# Patient Record
Sex: FEMALE | Race: WHITE | NOT HISPANIC OR LATINO | Employment: FULL TIME
[De-identification: names, ages, dates, MRNs, and addresses within clinical notes are randomized per-mention and may not be internally consistent; named-entity substitution may affect disease eponyms.]

---

## 2018-03-31 ENCOUNTER — HEALTH MAINTENANCE LETTER (OUTPATIENT)
Age: 58
End: 2018-03-31

## 2019-09-11 ENCOUNTER — OFFICE VISIT (OUTPATIENT)
Dept: FAMILY MEDICINE | Facility: CLINIC | Age: 59
End: 2019-09-11
Payer: COMMERCIAL

## 2019-09-11 VITALS
DIASTOLIC BLOOD PRESSURE: 81 MMHG | SYSTOLIC BLOOD PRESSURE: 141 MMHG | HEART RATE: 70 BPM | TEMPERATURE: 97.6 F | BODY MASS INDEX: 55.91 KG/M2 | RESPIRATION RATE: 16 BRPM | OXYGEN SATURATION: 98 % | WEIGHT: 293 LBS

## 2019-09-11 DIAGNOSIS — R03.0 ELEVATED BP WITHOUT DIAGNOSIS OF HYPERTENSION: ICD-10-CM

## 2019-09-11 DIAGNOSIS — M25.562 LEFT KNEE PAIN, UNSPECIFIED CHRONICITY: Primary | ICD-10-CM

## 2019-09-11 DIAGNOSIS — E66.01 MORBID OBESITY DUE TO EXCESS CALORIES (H): ICD-10-CM

## 2019-09-11 PROCEDURE — 99204 OFFICE O/P NEW MOD 45 MIN: CPT | Performed by: INTERNAL MEDICINE

## 2019-09-11 RX ORDER — LIRAGLUTIDE 6 MG/ML
INJECTION, SOLUTION SUBCUTANEOUS
Refills: 1 | COMMUNITY
Start: 2019-06-27 | End: 2020-01-07

## 2019-09-11 RX ORDER — ERGOCALCIFEROL 1.25 MG/1
CAPSULE, LIQUID FILLED ORAL
Refills: 1 | COMMUNITY
Start: 2019-07-02 | End: 2020-02-24

## 2019-09-11 NOTE — LETTER
September 11, 2019      Ludmila Sanchez  [address] [facility] [address]        To Whom It May Concern:    Ludmila Sanchez was seen in our clinic. She is not to stand or walk for more than one hour a day at work.       Sincerely,        Uyen Peñaloza MD

## 2019-09-11 NOTE — PROGRESS NOTES
SUBJECTIVE:  Ludmila Sanchez is an 59 year old female who presents for knee pain.  Moved from [place] to [place] recently and has a new job where she stands a lot. Is having pain in left knee where had knee replacement done in , and has done well until starting to stand kwame much at work.  Some swelling of the knee.  The right knee feels a little irritated as well.  Has taken some otc med for pain which helps a little.      PMH:   has a past medical history of Abnormal finding on MRI of brain, Cavernous angioma (2013), Bipolar disorder (H) (), History of colonoscopy (), Memory difficulties (2013), Sleep apnea, and Vitamin B12 deficiency (non anaemic) (2013).  Patient Active Problem List   Diagnosis     CARDIOVASCULAR SCREENING; LDL GOAL LESS THAN 160     Mild major depression (H)     Vitamin D deficiency     Bipolar affective disorder (H)     Memory difficulties     Vitamin B12 deficiency without anemia     Colonic polyp     Obesity     Cerebral cavernoma     Advanced directives, counseling/discussion     TEODORO (obstructive sleep apnea)     Morbid obesity due to excess calories (H)     Elevated blood pressure reading without diagnosis of hypertension     Bipolar affective disorder in remission (H)     Social History     Socioeconomic History     Marital status:      Spouse name: None     Number of children: 2     Years of education: 14     Highest education level: None   Occupational History     Occupation:      Employer: [employer]     Financial resource strain: None     Food insecurity:     Worry: None     Inability: None     Transportation needs:     Medical: None     Non-medical: None   Tobacco Use     Smoking status: Former Smoker     Types: Cigarettes     Last attempt to quit: 1980     Years since quittin.7     Smokeless tobacco: Never Used   Substance and Sexual Activity     Alcohol use: No     Comment: quit      Drug use: No     Sexual activity: Never    Lifestyle     Physical activity:     Days per week: None     Minutes per session: None     Stress: None   Relationships     Social connections:     Talks on phone: None     Gets together: None     Attends Nondenominational service: None     Active member of club or organization: None     Attends meetings of clubs or organizations: None     Relationship status: None     Intimate partner violence:     Fear of current or ex partner: None     Emotionally abused: None     Physically abused: None     Forced sexual activity: None   Other Topics Concern     Parent/sibling w/ CABG, MI or angioplasty before 65F 55M? Not Asked   Social History Narrative     None     Family History   Problem Relation Age of Onset     C.A.D. Mother 74        stents     Diabetes Father 70        type 2       ALLERGIES:  Latex; Contrast dye; Sudafed [fd&c red #40-fd&c yellow #6-pse]; Sulfa drugs; and Topamax [topiramate]    Current Outpatient Medications   Medication     order for DME     ORDER FOR DME     sertraline (ZOLOFT) 100 MG tablet     vitamin D2 (ERGOCALCIFEROL) 76017 units (1250 mcg) capsule     SAXENDA 18 MG/3ML pen     No current facility-administered medications for this visit.          ROS:  ROS is done and is negative for general/constitutional, eye, ENT, Respiratory, cardiovascular, GI, , Skin, musculoskeletal except as noted elsewhere.  All other review of systems negative except as noted elsewhere.      OBJECTIVE:  BP (!) 141/81   Pulse 70   Temp 97.6  F (36.4  C) (Oral)   Resp 16   Wt (!) 152.4 kg (336 lb)   LMP 11/01/2011   SpO2 98%   BMI 55.91 kg/m    GENERAL APPEARANCE: Alert, in no acute distress  EYES: normal  NOSE:normal  OROPHARYNX:normal  NECK:No adenopathy,masses or thyromegaly  RESP: normal and clear to auscultation  CV:regular rate and rhythm and no murmurs, clicks, or gallops  ABDOMEN: Abdomen soft, non-tender. BS normal. No masses, organomegaly  SKIN: no ulcers, lesions or rash  MUSCULOSKELETAL:left knee with  mild edema and mild joint line tenderness, normal rom with mild discomfort, no erythema or increased warmth or bruising.      RESULTS  .  No results found for this or any previous visit (from the past 48 hour(s)).    ASSESSMENT/PLAN:    ASSESSMENT / PLAN:  (M25.562) Left knee pain, unspecified chronicity  (primary encounter diagnosis)  Comment: has h/o knee replacement and had been doing well until new job where she is on her feet all day.  Plan: note written for work to have her only stand or walk for one hour a day at work.  If her sxs do not improve over the next month, f/u with pcp or ortho. I reviewed supportive care, otc meds to use if needed, expected course, and signs of concern.  Follow up as needed or if she does not improve within 1 month(s) or if worsens in any way.  Reviewed red flag symptoms and is to go to the ER if experiences any of these.    (E66.01) Morbid obesity due to excess calories (H)  Comment: her weight contributes to her knee pain  Plan: she had been working with physician in [place] on weight loss and had been on medication, but has recently moved back to [place].  Advised to f/u with pcp for ongoing treatment for weight loss.    (R03.0) Elevated BP without diagnosis of hypertension  Comment: bp borderline today  Plan: Recheck BP in 1-2 weeks with a nurse visit, [place] pharmacy visit, or a visit to primary care doctor.      See [facility] for orders, medications, letters, patient instructions    Uyen Peñaloza M.D.

## 2019-10-03 ENCOUNTER — HEALTH MAINTENANCE LETTER (OUTPATIENT)
Age: 59
End: 2019-10-03

## 2019-11-15 ENCOUNTER — OFFICE VISIT (OUTPATIENT)
Dept: FAMILY MEDICINE | Facility: CLINIC | Age: 59
End: 2019-11-15
Payer: COMMERCIAL

## 2019-11-15 VITALS
TEMPERATURE: 98.2 F | BODY MASS INDEX: 48.82 KG/M2 | WEIGHT: 293 LBS | HEIGHT: 65 IN | DIASTOLIC BLOOD PRESSURE: 73 MMHG | SYSTOLIC BLOOD PRESSURE: 164 MMHG | HEART RATE: 86 BPM

## 2019-11-15 DIAGNOSIS — Z00.00 ROUTINE GENERAL MEDICAL EXAMINATION AT A HEALTH CARE FACILITY: Primary | ICD-10-CM

## 2019-11-15 DIAGNOSIS — F31.31 BIPOLAR AFFECTIVE DISORDER, CURRENTLY DEPRESSED, MILD (H): ICD-10-CM

## 2019-11-15 DIAGNOSIS — G47.33 OSA (OBSTRUCTIVE SLEEP APNEA): ICD-10-CM

## 2019-11-15 DIAGNOSIS — I10 ELEVATED HEART RATE WITH ELEVATED BLOOD PRESSURE AND DIAGNOSIS OF HYPERTENSION: ICD-10-CM

## 2019-11-15 DIAGNOSIS — E53.8 VITAMIN B12 DEFICIENCY WITHOUT ANEMIA: ICD-10-CM

## 2019-11-15 DIAGNOSIS — E66.01 MORBID OBESITY DUE TO EXCESS CALORIES (H): ICD-10-CM

## 2019-11-15 DIAGNOSIS — E55.9 VITAMIN D DEFICIENCY: ICD-10-CM

## 2019-11-15 DIAGNOSIS — Z12.11 SCREEN FOR COLON CANCER: ICD-10-CM

## 2019-11-15 DIAGNOSIS — R00.9 ELEVATED HEART RATE WITH ELEVATED BLOOD PRESSURE AND DIAGNOSIS OF HYPERTENSION: ICD-10-CM

## 2019-11-15 LAB
HBA1C MFR BLD: 5.6 % (ref 0–5.6)
VIT B12 SERPL-MCNC: 423 PG/ML (ref 193–986)

## 2019-11-15 PROCEDURE — 82607 VITAMIN B-12: CPT | Performed by: NURSE PRACTITIONER

## 2019-11-15 PROCEDURE — 99386 PREV VISIT NEW AGE 40-64: CPT | Performed by: NURSE PRACTITIONER

## 2019-11-15 PROCEDURE — 83036 HEMOGLOBIN GLYCOSYLATED A1C: CPT | Performed by: NURSE PRACTITIONER

## 2019-11-15 PROCEDURE — 80061 LIPID PANEL: CPT | Performed by: NURSE PRACTITIONER

## 2019-11-15 PROCEDURE — 80048 BASIC METABOLIC PNL TOTAL CA: CPT | Performed by: NURSE PRACTITIONER

## 2019-11-15 PROCEDURE — 99213 OFFICE O/P EST LOW 20 MIN: CPT | Mod: 25 | Performed by: NURSE PRACTITIONER

## 2019-11-15 PROCEDURE — 84443 ASSAY THYROID STIM HORMONE: CPT | Performed by: NURSE PRACTITIONER

## 2019-11-15 PROCEDURE — 36415 COLL VENOUS BLD VENIPUNCTURE: CPT | Performed by: NURSE PRACTITIONER

## 2019-11-15 PROCEDURE — 82306 VITAMIN D 25 HYDROXY: CPT | Performed by: NURSE PRACTITIONER

## 2019-11-15 PROCEDURE — 87389 HIV-1 AG W/HIV-1&-2 AB AG IA: CPT | Performed by: NURSE PRACTITIONER

## 2019-11-15 RX ORDER — SERTRALINE HYDROCHLORIDE 100 MG/1
100 TABLET, FILM COATED ORAL DAILY
Qty: 90 TABLET | Refills: 0 | Status: SHIPPED | OUTPATIENT
Start: 2019-11-15 | End: 2020-11-19

## 2019-11-15 ASSESSMENT — MIFFLIN-ST. JEOR: SCORE: 2031

## 2019-11-15 NOTE — PROGRESS NOTES
"Subjective     Ludmila Sanchez is a 59 year old female who presents to clinic today for the following health issues:    HPI   {SUPERLIST (Optional):438872}    {additonal problems for provider to add (Optional):781282}    {HIST REVIEW/ LINKS 2 (Optional):114183}    Reviewed and updated as needed this visit by Provider         Review of Systems   {ROS COMP (Optional):788893}      Objective    BP (!) 164/73   Pulse 86   Temp 98.2  F (36.8  C) (Oral)   Ht 1.651 m (5' 5\")   Wt 146.5 kg (323 lb)   LMP 11/01/2011   BMI 53.75 kg/m    Body mass index is 53.75 kg/m .  Physical Exam   {Exam List (Optional):001809}    {Diagnostic Test Results (Optional):691449::\"Diagnostic Test Results:\",\"Labs reviewed in Epic\"}        {PROVIDER CHARTING PREFERENCE:696831}    "

## 2019-11-15 NOTE — PROGRESS NOTES
SUBJECTIVE:   CC: Ludmila Sanchez is an 59 year old woman who presents for preventive health visit.     Healthy Habits:    Do you get at least three servings of calcium containing foods daily (dairy, green leafy vegetables, etc.)? no, taking calcium and/or vitamin D supplement: yes     Amount of exercise or daily activities, outside of work: 0 day(s) per week    Problems taking medications regularly No    Medication side effects: No    Have you had an eye exam in the past two years? no    Do you see a dentist twice per year? yes    Do you have sleep apnea, excessive snoring or daytime drowsiness?yes sleep apnea     Elevated blood pressure without diagnosis of hypertension  And to repeat measurements today.  Denies headache says that usually outside of clinic is below 140/90.    Declined flu shot, will wait till later in the season.  Moved back from [place] in August 2019, daughter has lukemia. She has a strained relationship with her daughter.     Please abstract the following data from this visit with this patient into the appropriate field in Epic:    Tests that can be patient reported without a hard copy:    Mammogram done on this date: July 2019  (approximately), by this group: [place] , results were normal .       Bipolar disorder was followed by psychiatrist in the past.  Says that she is stable on Zoloft 100 mg daily.  She has multiple stressors in life her daughter is not doing well and has leukemia she moved back from [place] to help her and her 3 children out.  She is currently living with her mother, her daughter and her grandkids on the same house.  She has a strained relationship with her daughter currently.     Sleep apnea- had testing done in [place] and didn't get a chance to pick it up.    Weight concerns- not eating healthy. Knows what to do but doesn't do it.  BMI of 53.75 was followed by Dr. Shelby Yan in the past for medical management but she said that she never started medication.  Would  like to return.    History of vitamin D and B12 deficiency she has not currently taking any supplements.    Today's PHQ-2 Score:   PHQ-2 (  [employer]) 10/13/2016 2016   Q1: Little interest or pleasure in doing things 0 0   Q2: Feeling down, depressed or hopeless 0 0   PHQ-2 Score 0 0       Abuse: Current or Past(Physical, Sexual or Emotional)- No  Do you feel safe in your environment? Yes    Have you ever done Advance Care Planning? (For example, a Health Directive, POLST, or a discussion with a medical provider or your loved ones about your wishes): yes, on file     Social History     Tobacco Use     Smoking status: Former Smoker     Types: Cigarettes     Last attempt to quit: 1980     Years since quittin.8     Smokeless tobacco: Never Used   Substance Use Topics     Alcohol use: No     Comment: quit      If you drink alcohol do you typically have >3 drinks per day or >7 drinks per week? No                     Reviewed orders with patient.  Reviewed health maintenance and updated orders accordingly - Yes  Lab work is in process    Mammogram Screening: Patient over age 50, mutual decision to screen reflected in health maintenance.    Pertinent mammograms are reviewed under the imaging tab.  History of abnormal Pap smear: NO - age 30-65 PAP every 5 years with negative HPV co-testing recommended  PAP / HPV Latest Ref Rng & Units 6/10/2016 2009   PAP - OTHER-NIL, See Result NIL   HPV 16 DNA NEG Negative -   HPV 18 DNA NEG Negative -   OTHER HR HPV NEG Negative -     Reviewed and updated as needed this visit by clinical staff  Tobacco  Allergies  Meds  Med Hx  Surg Hx  Fam Hx  Soc Hx        Reviewed and updated as needed this visit by Provider            ROS:  CONSTITUTIONAL: NEGATIVE for fever, chills, change in weight  INTEGUMENTARY/SKIN: NEGATIVE for worrisome rashes, moles or lesions  EYES: NEGATIVE for vision changes or irritation  ENT: NEGATIVE for ear, mouth and throat problems  RESP:  "NEGATIVE for significant cough or SOB  BREAST: NEGATIVE for masses, tenderness or discharge  CV: NEGATIVE for chest pain, palpitations or peripheral edema  GI: NEGATIVE for nausea, abdominal pain, heartburn, or change in bowel habits  : NEGATIVE for unusual urinary or vaginal symptoms. No vaginal bleeding.  MUSCULOSKELETAL: NEGATIVE for significant arthralgias or myalgia  NEURO: NEGATIVE for weakness, dizziness or paresthesias  ENDOCRINE: obewsity  PSYCHIATRIC: bipolar     OBJECTIVE:   BP (!) 164/73   Pulse 86   Temp 98.2  F (36.8  C) (Oral)   Ht 1.651 m (5' 5\")   Wt 146.5 kg (323 lb)   LMP 11/01/2011   BMI 53.75 kg/m    EXAM:  GENERAL: Morbidly obese alert and no distress  EYES: Eyes grossly normal to inspection, PERRL and conjunctivae and sclerae normal  HENT: ear canals and TM's normal, nose and mouth without ulcers or lesions  NECK: no adenopathy, no asymmetry, masses, or scars and thyroid normal to palpation  RESP: lungs clear to auscultation - no rales, rhonchi or wheezes  BREAST: normal without masses, tenderness or nipple discharge and no palpable axillary masses or adenopathy  CV: regular rate and rhythm, normal S1 S2, no S3 or S4, no murmur, click or rub, no peripheral edema and peripheral pulses strong  ABDOMEN: soft, nontender, no hepatosplenomegaly, no masses and bowel sounds normal  MS: no gross musculoskeletal defects noted, no edema  SKIN: no suspicious lesions or rashes  NEURO: Normal strength and tone, mentation intact and speech normal  PSYCH: Tearful throughout encounter when discussing her daughter  LYMPH: no cervical, supraclavicular, axillary, or inguinal adenopathy    Diagnostic Test Results:  Labs reviewed in Epic  Results for orders placed or performed in visit on 11/15/19 (from the past 24 hour(s))   Hemoglobin A1c   Result Value Ref Range    Hemoglobin A1C 5.6 0 - 5.6 %       ASSESSMENT/PLAN:   1. Routine general medical examination at a health care facility  Establish care visit " plus preventive visit plus chronic conditions addressed below  - HIV Screening  - GASTROENTEROLOGY ADULT REF PROCEDURE ONLY  - Hemoglobin A1c  - Lipid panel reflex to direct LDL Fasting  2. Morbid obesity due to excess calories (H)  BMI of 53.75 recommend medical weight management referral to Dr. Shelby Yan  - Basic metabolic panel  - TSH with free T4 reflex  - INTERNAL MEDICINE REFERRAL    3. Bipolar affective disorder, currently depressed, mild (H)  Needs improvement was tearful throughout the encounter today mostly while discussing her daughter who has leukemia, and multiple stressors in her life.  Refills provided close follow-up recommended.  - sertraline (ZOLOFT) 100 MG tablet; Take 1 tablet (100 mg) by mouth daily Take 100mg daily  Dispense: 90 tablet; Refill: 0    4. Screen for colon cancer  Needs colonoscopy    5. Vitamin B12 deficiency without anemia  Labs today  - Vitamin B12    6. Vitamin D deficiency    - Vitamin D Deficiency    8. TEODORO (obstructive sleep apnea)  Diagnosed with TEODORO in [place] did not follow through with CPAP since she had to move back to help with her daughter diagnosed with leukemia.  Referral placed  - SLEEP EVALUATION & MANAGEMENT REFERRAL - ADULT -[facility] [place] - [phone]; Future    (I10,  R00.9) Elevated heart rate with elevated blood pressure and diagnosis of hypertension  Elevated on repeat measurement today I discussed likelihood that she will need to start a medication she prefers to come back next week to have her blood pressure recheck to the pharmacy if it is elevated at this time I do recommend starting chlorthalidone 12.5 mg daily.    COUNSELING:   Reviewed preventive health counseling, as reflected in patient instructions       Regular exercise       Healthy diet/nutrition       Vision screening       Has referrals placed above    Estimated body mass index is 53.75 kg/m  as calculated from the following:    Height as of this encounter:  "1.651 m (5' 5\").    Weight as of this encounter: 146.5 kg (323 lb).    Weight management plan: Patient referred to endocrine and/or weight management specialty     reports that she quit smoking about 39 years ago. Her smoking use included cigarettes. She has never used smokeless tobacco.      Counseling Resources:  ATP IV Guidelines  Pooled Cohorts Equation Calculator  Breast Cancer Risk Calculator  FRAX Risk Assessment  ICSI Preventive Guidelines  Dietary Guidelines for Americans, 2010  USDA's MyPlate  ASA Prophylaxis  Lung CA Screening    JUANJOSE Cardoso [facility]  "

## 2019-11-16 LAB
ANION GAP SERPL CALCULATED.3IONS-SCNC: 3 MMOL/L (ref 3–14)
BUN SERPL-MCNC: 13 MG/DL (ref 7–30)
CALCIUM SERPL-MCNC: 8.7 MG/DL (ref 8.5–10.1)
CHLORIDE SERPL-SCNC: 106 MMOL/L (ref 94–109)
CHOLEST SERPL-MCNC: 194 MG/DL
CO2 SERPL-SCNC: 30 MMOL/L (ref 20–32)
CREAT SERPL-MCNC: 0.94 MG/DL (ref 0.52–1.04)
GFR SERPL CREATININE-BSD FRML MDRD: 65 ML/MIN/{1.73_M2}
GLUCOSE SERPL-MCNC: 108 MG/DL (ref 70–99)
HDLC SERPL-MCNC: 37 MG/DL
LDLC SERPL CALC-MCNC: 128 MG/DL
NONHDLC SERPL-MCNC: 157 MG/DL
POTASSIUM SERPL-SCNC: 4.1 MMOL/L (ref 3.4–5.3)
SODIUM SERPL-SCNC: 139 MMOL/L (ref 133–144)
TRIGL SERPL-MCNC: 144 MG/DL
TSH SERPL DL<=0.005 MIU/L-ACNC: 2.87 MU/L (ref 0.4–4)

## 2019-11-18 LAB
DEPRECATED CALCIDIOL+CALCIFEROL SERPL-MC: 19 UG/L (ref 20–75)
HIV 1+2 AB+HIV1 P24 AG SERPL QL IA: NONREACTIVE

## 2019-11-19 DIAGNOSIS — R73.9 ELEVATED BLOOD SUGAR: ICD-10-CM

## 2019-11-19 DIAGNOSIS — E78.5 HYPERLIPIDEMIA LDL GOAL <130: Primary | ICD-10-CM

## 2019-12-05 ENCOUNTER — TELEPHONE (OUTPATIENT)
Dept: FAMILY MEDICINE | Facility: CLINIC | Age: 59
End: 2019-12-05

## 2019-12-05 NOTE — LETTER
December 5, 2019      Ludmila Sanchez  [address] [facility] [address] [facility] [address]        Dear Ludmila,       December 5, 2019    Ludmila Wall  [address] [facility] [place] [address]    Dear Ludmila,    We care about your health and have reviewed your health plan. We have reviewed your medical conditions, medication list, and lab results and are making recommendations based on this review, to better manage your health.    You are in particular need of attention regarding:  - Scheduling a Breast Cancer Screening (Mammography) [phone]. If this has been done elsewhere within the last 2 years, please let us know when, where, and the result so we can add it to your medical history  - Scheduling a Colon Cancer Screening (Colonoscopy only) [phone] for [facility], call clinic for referral elsewhere    Here is a list of Health Maintenance topics that are due now or due soon:  Health Maintenance Due   Topic Date Due     ZOSTER IMMUNIZATION (1 of 2) 06/16/2008     COLONOSCOPY  01/29/2018     MAMMO SCREENING  05/25/2018     ADVANCE CARE PLANNING  07/08/2018     INFLUENZA VACCINE (1) 09/01/2019       Please call us at [phone] (or use [employer]) to address the above recommendations.     Thank you for trusting [facility] with your healthcare needs. We appreciate the opportunity to serve you and look forward to supporting you in the future.    Healthy Regards,          Sincerely,        JUANJOSE Cardoso CNP

## 2019-12-05 NOTE — TELEPHONE ENCOUNTER
Panel Management Review      Patient has the following on her problem list:     Depression / Dysthymia review    Measure:  Needs PHQ-9 score of 4 or less during index window.  Administer PHQ-9 and if score is 5 or more, send encounter to provider for next steps.    5 - 7 month window range:     PHQ-9 SCORE 4/11/2013 4/12/2013 6/10/2016   PHQ-9 Total Score - 9 -   PHQ-9 Total Score MyChart 9 - -   PHQ-9 Total Score - - 5       If PHQ-9 recheck is 5 or more, route to provider for next steps.    Patient is due for:  None      Composite cancer screening  Chart review shows that this patient is due/due soon for the following Mammogram and Colonoscopy  Summary:    Patient is due/failing the following:   COLONOSCOPY and MAMMOGRAM    Action needed:   Patient needs to schedule a mammogram and colonoscopy     Type of outreach:    Sent [employer] message. and Sent letter.    Questions for provider review:    None                                                                                                                                    Naya Foreman CMA (AAMA)

## 2020-01-07 ENCOUNTER — OFFICE VISIT (OUTPATIENT)
Dept: FAMILY MEDICINE | Facility: CLINIC | Age: 60
End: 2020-01-07
Payer: COMMERCIAL

## 2020-01-07 VITALS
SYSTOLIC BLOOD PRESSURE: 159 MMHG | HEIGHT: 65 IN | WEIGHT: 293 LBS | BODY MASS INDEX: 48.82 KG/M2 | TEMPERATURE: 97.6 F | DIASTOLIC BLOOD PRESSURE: 88 MMHG

## 2020-01-07 DIAGNOSIS — Z12.11 SCREEN FOR COLON CANCER: ICD-10-CM

## 2020-01-07 DIAGNOSIS — R73.9 ELEVATED BLOOD SUGAR: ICD-10-CM

## 2020-01-07 DIAGNOSIS — Z01.818 PREOP GENERAL PHYSICAL EXAM: Primary | ICD-10-CM

## 2020-01-07 DIAGNOSIS — E78.5 HYPERLIPIDEMIA LDL GOAL <130: ICD-10-CM

## 2020-01-07 DIAGNOSIS — I10 ESSENTIAL HYPERTENSION: ICD-10-CM

## 2020-01-07 DIAGNOSIS — G47.33 OSA (OBSTRUCTIVE SLEEP APNEA): ICD-10-CM

## 2020-01-07 DIAGNOSIS — Z23 FLU VACCINE NEED: ICD-10-CM

## 2020-01-07 LAB — HBA1C MFR BLD: 5.5 % (ref 0–5.6)

## 2020-01-07 PROCEDURE — 83036 HEMOGLOBIN GLYCOSYLATED A1C: CPT | Performed by: NURSE PRACTITIONER

## 2020-01-07 PROCEDURE — 90682 RIV4 VACC RECOMBINANT DNA IM: CPT | Performed by: NURSE PRACTITIONER

## 2020-01-07 PROCEDURE — 99214 OFFICE O/P EST MOD 30 MIN: CPT | Mod: 25 | Performed by: NURSE PRACTITIONER

## 2020-01-07 PROCEDURE — 90471 IMMUNIZATION ADMIN: CPT | Performed by: NURSE PRACTITIONER

## 2020-01-07 PROCEDURE — 36415 COLL VENOUS BLD VENIPUNCTURE: CPT | Performed by: NURSE PRACTITIONER

## 2020-01-07 PROCEDURE — 80061 LIPID PANEL: CPT | Performed by: NURSE PRACTITIONER

## 2020-01-07 RX ORDER — CHLORTHALIDONE 25 MG/1
12.5 TABLET ORAL DAILY
Qty: 15 TABLET | Refills: 1 | Status: SHIPPED | OUTPATIENT
Start: 2020-01-07 | End: 2020-06-24

## 2020-01-07 ASSESSMENT — MIFFLIN-ST. JEOR: SCORE: 2026.46
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
## 2020-01-08 ENCOUNTER — OFFICE VISIT (OUTPATIENT)
Dept: INTERNAL MEDICINE | Facility: CLINIC | Age: 60
End: 2020-01-08
Payer: COMMERCIAL

## 2020-01-08 VITALS
WEIGHT: 293 LBS | HEART RATE: 118 BPM | DIASTOLIC BLOOD PRESSURE: 82 MMHG | HEIGHT: 65 IN | SYSTOLIC BLOOD PRESSURE: 144 MMHG | OXYGEN SATURATION: 94 % | RESPIRATION RATE: 17 BRPM | TEMPERATURE: 96.1 F | BODY MASS INDEX: 48.82 KG/M2

## 2020-01-08 DIAGNOSIS — G47.33 OSA (OBSTRUCTIVE SLEEP APNEA): ICD-10-CM

## 2020-01-08 DIAGNOSIS — F31.31 BIPOLAR AFFECTIVE DISORDER, CURRENTLY DEPRESSED, MILD (H): ICD-10-CM

## 2020-01-08 LAB
CHOLEST SERPL-MCNC: 229 MG/DL
HDLC SERPL-MCNC: 44 MG/DL
LDLC SERPL CALC-MCNC: 142 MG/DL
NONHDLC SERPL-MCNC: 185 MG/DL
TRIGL SERPL-MCNC: 214 MG/DL

## 2020-01-08 PROCEDURE — 99213 OFFICE O/P EST LOW 20 MIN: CPT | Performed by: INTERNAL MEDICINE

## 2020-01-08 ASSESSMENT — MIFFLIN-ST. JEOR: SCORE: 2006.05

## 2020-01-08 ASSESSMENT — PAIN SCALES - GENERAL: PAINLEVEL: NO PAIN (0)

## 2020-01-08 NOTE — PATIENT INSTRUCTIONS
Follow up with me regarding the specific type of thyroid cancer in family history.  Follow up with nutritionist.   Radha Schumacher, Syeda Joseph, Melissa Castillo, Erin Castorena are excellent nutritionists to see.  I will prescribe Saxenda once I know the type of thyroid cancer your sister had.  I can arrange teaching on how to do the injection at that time.

## 2020-01-08 NOTE — PROGRESS NOTES
"Medical Weight Loss - Return Visit      CHIEF COMPLAINT:     Chief Complaint   Patient presents with     Consult       HISTORY OF PRESENT ILLNESS (HPI):    Patient  returns today for medical weight loss follow-up visit. Patient was last seen by me on Visit date not found and has lost 0 pounds.      The patient just recently moved back to [place] after living in [place] for 3 years. Patient states that she has been under a lot of stress due to her daughter being recently diagnosed with cancer. She has also been taking care of her grandchildren and feels like she does not have time to prepare healthy food. The patient states that she is a stress eater and likes to have \"quick food\" to grab and usually goes for sugary food.   The patient states that she was taking Saxenda while in [place].    The patient does crave sweets  The patient does have a difficult time resisting the urge to stop eating  The patient eats more rapidly than others  The patient eats until uncomfortably full  The patient eats large amounts when not hungry  She continues on no meds.    She does note adverse side effects from this medication as follows:   This/these side effect has now resolved.   She continues dietary efforts and exercise program.   Patient is not seeing dietitian   Patient is not seeing PT   Patient is not seeing a behavioralist  MEDICATIONS:   Current Outpatient Medications   Medication Sig Dispense Refill     chlorthalidone (HYGROTON) 25 MG tablet Take 0.5 tablets (12.5 mg) by mouth daily 15 tablet 1     order for DME Equipment being ordered: CPAP, mask and tubing 1 Device 0     ORDER FOR DME Replacement C-pap and heater (S9 Auto Set) 1 Device 0     sertraline (ZOLOFT) 100 MG tablet Take 1 tablet (100 mg) by mouth daily Take 100mg daily 90 tablet 0     vitamin D2 (ERGOCALCIFEROL) 15531 units (1250 mcg) capsule TAKE 1 CAPSULE BY MOUTH ONCE A WEEK  1       ALLERGIES:   Allergies   Allergen Reactions     Latex Rash     Contrast " "Dye Hives     Diatrizoate      Welt     Pseudoephedrine Hives     Welts     Sudafed [Fd&C Red #40-Fd&C Yellow #6-Pse] Hives     Sulfa Drugs Hives     Topamax [Topiramate] Other (See Comments)     At 50 mg twice daily had cognitive impairment       PHYSICAL EXAMINATION:    VITALS: BP (!) 144/82 (BP Location: Right arm, Cuff Size: Adult Large)   Pulse 118   Temp 96.1  F (35.6  C) (Oral)   Resp 17   Ht 1.651 m (5' 5\")   Wt 144 kg (317 lb 8 oz)   LMP 11/01/2011   SpO2 94%   BMI 52.83 kg/m    GENERAL: Patient is a 59 year old year old female  in no acute distress.  Patient is alert and orientated x 4, pleasant and cooperative with exam.       CARDIOVASCULAR:  Regular rate and rhythm without murmurs, rubs, or gallops.  RESPIRATORY:  Lungs are clear to auscultation bilaterally, respiratory effort is normal.   No edema    LAB RESULTS:   Office Visit on 01/07/2020   Component Date Value Ref Range Status     Hemoglobin A1C 01/07/2020 5.5  0 - 5.6 % Final    Comment: Normal <5.7% Prediabetes 5.7-6.4%  Diabetes 6.5% or higher - adopted from ADA   consensus guidelines.       Cholesterol 01/07/2020 229* <200 mg/dL Final    Desirable:       <200 mg/dl     Triglycerides 01/07/2020 214* <150 mg/dL Final    Comment: Borderline high:  150-199 mg/dl  High:             200-499 mg/dl  Very high:       >499 mg/dl  Non Fasting       HDL Cholesterol 01/07/2020 44* >49 mg/dL Final     LDL Cholesterol Calculated 01/07/2020 142* <100 mg/dL Final    Comment: Above desirable:  100-129 mg/dl  Borderline High:  130-159 mg/dL  High:             160-189 mg/dL  Very high:       >189 mg/dl       Non HDL Cholesterol 01/07/2020 185* <130 mg/dL Final    Comment: Above Desirable:  130-159 mg/dl  Borderline high:  160-189 mg/dl  High:             190-219 mg/dl  Very high:       >219 mg/dl         ASSESSMENT/PLAN:    1. BMI 50.0-59.9, adult (H)  Patient will continue to work on food habits and follow up.  She was on saxenda in [place] and this is a good " option for her except she has an unclear history of thyroid cancer in her sister.  She needs to clarify if this was medullary thyroid carcinoma because if it is then it is contraindicated.  There are no other medication options for her.  Bariatric surgery is not a good options for her either.      I made it very clear that the Saxenda would be mostly to blunt further weight gain.  However, if she wanted to lose weight then she would have to stop working third shift, get decent regular sleep, lower her stress, start exercising, etc.  She currently is not in a place to do this at this time.   - NUTRITION REFERRAL    2. Bipolar affective disorder, currently depressed, mild (H)  Patient compliant with psych medications. Patient asymptomatic     3. TEODORO (obstructive sleep apnea)  Patient has a history of. Will continue to monitor.             Patient is to call the clinic if she experiences any adverse reactions.     Patient Instructions   Follow up with me regarding the specific type of thyroid cancer in family history.  Follow up with nutritionist.       Shelby Yan MD  Internal Medicine    American Board of Obesity Medicine Diplomate       Start: 10:20  End 10:35

## 2020-01-09 PROBLEM — E78.5 HYPERLIPIDEMIA LDL GOAL <100: Status: ACTIVE | Noted: 2020-01-09

## 2020-01-09 PROBLEM — I10 ESSENTIAL HYPERTENSION: Status: ACTIVE | Noted: 2020-01-09

## 2020-01-14 ENCOUNTER — TELEPHONE (OUTPATIENT)
Dept: FAMILY MEDICINE | Facility: CLINIC | Age: 60
End: 2020-01-14

## 2020-01-14 DIAGNOSIS — E66.01 MORBID OBESITY DUE TO EXCESS CALORIES (H): Primary | ICD-10-CM

## 2020-01-14 NOTE — TELEPHONE ENCOUNTER
Ok, then she can do saxenda if she wants.  Saxenda is only contraindicated with a different type of thyroid cancer.      She would need to come in for teaching.  Script pended.  Would need to see me in 4 weeks in the weight clini.

## 2020-01-14 NOTE — TELEPHONE ENCOUNTER
Reason for Call:  Other Patient Information    Detailed comments: Patient states her sister had papillary thyroid cancer.    Please relay this information to Dr. Yan, thanks.    FYI    Phone Number Patient can be reached at: n/a    Best Time: n/a    Can we leave a detailed message on this number? Not Applicable    Call taken on 1/14/2020 at 3:06 PM by Juliane Quintanilla

## 2020-01-15 NOTE — TELEPHONE ENCOUNTER
Patient notified and agreed with plan  - Prescription printed as instructions are too long to E-prescribe- please fax to [facility]    RN injection teaching appointment scheduled for tomorrow   F/u appointment with Dr. Yan scheduled for 2/12/2020    Kimberlee Marks RN

## 2020-01-16 ENCOUNTER — ALLIED HEALTH/NURSE VISIT (OUTPATIENT)
Dept: NURSING | Facility: CLINIC | Age: 60
End: 2020-01-16
Payer: COMMERCIAL

## 2020-01-16 DIAGNOSIS — E66.01 MORBID OBESITY DUE TO EXCESS CALORIES (H): Primary | ICD-10-CM

## 2020-01-16 PROCEDURE — 99207 ZZC NO CHARGE NURSE ONLY: CPT

## 2020-01-16 NOTE — PROGRESS NOTES
Patient has given injection like this before and is aware how to use injection pen.  RN reviewed storage and injection steps.  Patient returned injection demonstration with teaching materials and successfully completed practice injection.  Reviewed needle disposal.  No questions from patient.    Moriah Sibley RN

## 2020-01-24 ENCOUNTER — OFFICE VISIT (OUTPATIENT)
Dept: FAMILY MEDICINE | Facility: CLINIC | Age: 60
End: 2020-01-24
Payer: OTHER MISCELLANEOUS

## 2020-01-24 ENCOUNTER — ANCILLARY PROCEDURE (OUTPATIENT)
Dept: GENERAL RADIOLOGY | Facility: CLINIC | Age: 60
End: 2020-01-24
Attending: FAMILY MEDICINE
Payer: OTHER MISCELLANEOUS

## 2020-01-24 VITALS
DIASTOLIC BLOOD PRESSURE: 78 MMHG | OXYGEN SATURATION: 97 % | TEMPERATURE: 97.5 F | WEIGHT: 293 LBS | SYSTOLIC BLOOD PRESSURE: 142 MMHG | RESPIRATION RATE: 22 BRPM | HEART RATE: 88 BPM | BODY MASS INDEX: 48.82 KG/M2 | HEIGHT: 65 IN

## 2020-01-24 DIAGNOSIS — M62.830 BACK MUSCLE SPASM: ICD-10-CM

## 2020-01-24 DIAGNOSIS — M54.50 ACUTE MIDLINE LOW BACK PAIN WITHOUT SCIATICA: Primary | ICD-10-CM

## 2020-01-24 DIAGNOSIS — M54.50 ACUTE MIDLINE LOW BACK PAIN WITHOUT SCIATICA: ICD-10-CM

## 2020-01-24 PROCEDURE — 99213 OFFICE O/P EST LOW 20 MIN: CPT | Performed by: FAMILY MEDICINE

## 2020-01-24 PROCEDURE — 72100 X-RAY EXAM L-S SPINE 2/3 VWS: CPT | Mod: FY

## 2020-01-24 RX ORDER — HYDROCODONE BITARTRATE AND ACETAMINOPHEN 5; 325 MG/1; MG/1
1 TABLET ORAL EVERY 6 HOURS PRN
Qty: 10 TABLET | Refills: 0 | Status: SHIPPED | OUTPATIENT
Start: 2020-01-24 | End: 2020-01-27

## 2020-01-24 RX ORDER — CYCLOBENZAPRINE HCL 5 MG
5 TABLET ORAL 3 TIMES DAILY PRN
Qty: 18 TABLET | Refills: 0 | Status: SHIPPED | OUTPATIENT
Start: 2020-01-24 | End: 2020-02-12

## 2020-01-24 ASSESSMENT — MIFFLIN-ST. JEOR: SCORE: 1974.75

## 2020-01-24 ASSESSMENT — PAIN SCALES - GENERAL: PAINLEVEL: EXTREME PAIN (8)

## 2020-01-24 NOTE — PATIENT INSTRUCTIONS
Your X-rays were looking normal.  We will have radiology take a look at them too and let you know if they find anything concerning.  Your pain is most likely from a muscle spasm.  Feel free to take the muscle relaxers (Flexeril) and the pain medication (Norco) as needed.  Come back in a week or two if things aren't feeling better.      Patient Education     Relieving Back Pain  Back pain is a common problem. You can strain back muscles by lifting too much weight or just by moving the wrong way. Back strain can be uncomfortable, even painful. And it can take weeks or months to improve. To help yourself feel better and prevent future back strains, try these tips.  Important: Don't give aspirin to children or teens without first discussing it with your child's healthcare provider.  Ice    Ice reduces muscle pain and swelling. It helps most during the first 24 to 48 hours after an injury.    Wrap an ice pack or a bag of frozen peas in a thin towel. Never put ice directly on your skin.    Place the ice where your back hurts the most.    Don t ice for more than 20 minutes at a time.    You can use ice several times a day.  Medicines  Over-the-counter pain relievers include acetaminophen and anti-inflammatory medicines, which includes aspirin, naproxen, or ibuprofen. They can help ease discomfort. Some also reduce swelling.    Tell your healthcare provider about any medicines you are already taking.    Take medicines only as directed.  Manipulation and massage  Having manipulation by an osteopathic doctor or chiropractor may be helpful. Getting a massage also may help.   Heat  After the first 48 hours, heat can relax sore muscles and improve blood flow.    Try a warm bath or shower. Or use a heating pad set on low. To prevent a burn, keep a cloth between you and the heating pad.    Don t use a heating pad for more than 15 minutes at a time. Never sleep on a heating pad.  Date Last Reviewed: 6/1/2018 2000-2019 [employer]  [employer]. [address] [place] [address], [place] [address]. All rights reserved. This information is not intended as a substitute for professional medical care. Always follow your healthcare professional's instructions.
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
## 2020-01-29 ENCOUNTER — OFFICE VISIT (OUTPATIENT)
Dept: NUTRITION | Facility: CLINIC | Age: 60
End: 2020-01-29
Payer: COMMERCIAL

## 2020-01-29 VITALS — WEIGHT: 293 LBS | BODY MASS INDEX: 47.09 KG/M2 | HEIGHT: 66 IN

## 2020-01-29 DIAGNOSIS — E78.5 HYPERLIPIDEMIA LDL GOAL <100: ICD-10-CM

## 2020-01-29 DIAGNOSIS — E66.01 MORBID OBESITY DUE TO EXCESS CALORIES (H): Primary | ICD-10-CM

## 2020-01-29 PROCEDURE — 97802 MEDICAL NUTRITION INDIV IN: CPT

## 2020-01-29 RX ORDER — MULTIPLE VITAMINS W/ MINERALS TAB 9MG-400MCG
1 TAB ORAL DAILY
COMMUNITY
End: 2021-08-19

## 2020-01-29 ASSESSMENT — MIFFLIN-ST. JEOR: SCORE: 1983.59

## 2020-01-29 NOTE — PROGRESS NOTES
Medical Nutrition Therapy  Visit Type:Initial assessment and intervention    Ludmila Sanchez presents today for MNT and education related to weight management.   She is accompanied by self.     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  When she gets home, takes grandchildren to school.  Tries to go to bed by 9am and wakes at 2:30pm to pick them up from school.  Says will grab something quick to eat but mostly sugar.  Won't eat at night but will drink water or coffee.     Says not hungry at work.  Says taking the shots and trying to not gain weight.  Would be nice to lose the weight.  Hates to cook- knows how and what has to do but too busy running around.  Finds Greek yogurt, berries and small amount of granola stays with her.  Likes instant oatmeal with raisins. Not interested in salads. Likes celery with PB or crackers with PB or piece toast with cheese/PB.      Likes berries and apples.      NUTRITION HISTORY:  Wakes: 2:30pm:   Meal 1 : none  Meal 2: 3-5pm: chicken pot pie OR 1.5 cups goulash OR roasted chicken breast   Meal 3: none OR water OR coffee -[place]aro[place] vanilla flavored- 1 cup (3am)  Snacks: 6:15-6:30am: 2 pc toast with butter, cheese, PB  Beverages: Coffee drink 1x/day, 20 oz coke and Water all day    Misses meals? yes  Eats out:  3 meals/per week     Previous diet education:  Yes     Food allergies/intolerances: none  Dislikes: [place] sprouts     Diet is high in: carbs and sodium at some sittings  Diet is low in: fiber, fruits and vegetables    EXERCISE: no regular exercise program due to time.  Says walks at work and on her feet all night. Would like to see about going to an aerobic water class.  Says she has a left knee replacement- hard on knees and cannot kneel on it.     SOCIO/ECONOMIC:   Lives with: grandchildren    MEDICATIONS:  Current Outpatient Medications   Medication     chlorthalidone  "(HYGROTON) 25 MG tablet     cyclobenzaprine (FLEXERIL) 5 MG tablet     insulin pen needle (31G X 8 MM) 31G X 8 MM miscellaneous     liraglutide - Weight Management (SAXENDA) 18 MG/3ML pen     order for DME     ORDER FOR DME     sertraline (ZOLOFT) 100 MG tablet     vitamin D2 (ERGOCALCIFEROL) 24214 units (1250 mcg) capsule     No current facility-administered medications for this visit.        LABS:  Last Basic Metabolic Panel:  Lab Results   Component Value Date     11/15/2019      Lab Results   Component Value Date    POTASSIUM 4.1 11/15/2019     Lab Results   Component Value Date    CHLORIDE 106 11/15/2019     Lab Results   Component Value Date    DENISE 8.7 11/15/2019     Lab Results   Component Value Date    CO2 30 11/15/2019     Lab Results   Component Value Date    BUN 13 11/15/2019     Lab Results   Component Value Date    CR 0.94 11/15/2019     Lab Results   Component Value Date     11/15/2019       ANTHROPOMETRICS:  Vitals: Ht 1.664 m (5' 5.5\")   Wt 141 kg (310 lb 12.8 oz)   LMP 11/01/2011   BMI 50.93 kg/m    Body mass index is 50.93 kg/m .      Wt Readings from Last 5 Encounters:   01/24/20 140.9 kg (310 lb 9.6 oz)   01/08/20 144 kg (317 lb 8 oz)   01/07/20 146.1 kg (322 lb)   11/15/19 146.5 kg (323 lb)   09/11/19 (!) 152.4 kg (336 lb)       Weight Change: Lost 7 lbs in the past 3 weeks    ESTIMATED KCAL REQUIREMENTS:  2418 kcal per day    NUTRITION DIAGNOSIS: Overweight/Obesity related to excessive energy intake as evidenced by BMI>50    NUTRITION INTERVENTION:  Nutrition Prescription: Energy Intake: 3012-2639 kcal/day for weight loss  Education given to support: general nutrition guidelines, weight reduction, consistent meals, sodium, fat modification, exercise, fiber, behavior modification, portion control and heart healthy diet  Education Materials Provided: 1500 Calorie 5-day Sample Menus, 100 Calorie Snacks, Weight Loss Tips and Cooking Tips for Weight Management  Motivational " Interviewing    Discussion: Reviewed patient's 24 hour diet recall.  Discussed importance of eating minimum 3 times a day to help spread intake more evenly throughout the day and fuel metabolism.  Discussed healthy breakfast, lunch, dinner and snack ideas and suggested plate method for simplicity on balancing meals.  Informed estimated energy needs minimum 1200 calories/day for weight loss, and suggested meal recording to help identify problem areas if struggling. Discussed general healthy eating tips and ways to make it easier to eat healthy foods more often.  Patient's diet appears low in fruits and vegetables and is now taking a MVI to better meet nutrient needs.  Answered her food related questions.    Encouraged Ludmila to include a variety of foods and try to choose more whole foods and cut back on processed foods and sugar.  To help lower sodium, suggested using canister oatmeal instead of packaged to help reduce sodium.  Heart healthy fats were encouraged in moderation, as was lean meats and heathy preparation methods to help with weight loss and improve LDL cholesterol.  Encouraged increasing activity as tolerated and per MD approval to help with weight loss and muscle retention.  Patient seems receptive to the information provided and verbalized understanding of concepts discussed and recommendations provided.        PATIENT'S BEHAVIOR CHANGE GOALS:   See Patient Instructions for patient stated behavior change goals. AVS was printed and given to patient at today's appointment.    MONITOR / EVALUATE:  RD will monitor/evaluate:  Food / Beverage / Nutrient intake   Weight change    FOLLOW-UP:  Call RD with questions/concerns.   Follow-up appointment scheduled on 3/20/20.     Syeda Joseph RD, LD, CDE   Time spent in minutes: 60 minutes  Encounter: Individual

## 2020-01-29 NOTE — PATIENT INSTRUCTIONS
1. Try canister oatmeal with your raisins or craisins- will have less sodium than the packet.    2. Try to add activity during the day as possible- makes weight loss easier and helps your body hold onto muscle mass.     3. Aiming for minimum of 1200 calories a day and trying to limit 1800 calories.     4. Cut out the regular soda.  Limit sweets to 1 time a day.  Have 5-10 Triscuit with cottage cheese if you hungry with the coffee.     5. If you are hungry, try to eat a snack with a little protein and carbohydrates.    6. Snack ideas: mini popcorn and cheese stick, apple or celery with (1-2 Tbsp) peanut butter, 5-10 Triscuit with cottage cheese cup    7. Try to problem solve barriers with healthy choices: make ahead on weekend or pre-prep into grab and go portions for work?  Buy things already to eat.     8. Protein snack packs are ok too. Try to go with whole foods.      9. Body is looking for energy every 4-6 hours.     FOLLOW UP: Friday, March 20th at 8 am at [facility]    Syeda Joseph RD, LD, CDE   [phone]

## 2020-02-11 NOTE — PROGRESS NOTES
INTERNAL MEDICINE  Medical Weight Loss - Return Visit      CHIEF COMPLAINT:  Recheck weight      Wt Readings from Last 5 Encounters:   01/29/20 141 kg (310 lb 12.8 oz)   01/24/20 140.9 kg (310 lb 9.6 oz)   01/08/20 144 kg (317 lb 8 oz)   01/07/20 146.1 kg (322 lb)   11/15/19 146.5 kg (323 lb)     Patient Instructions on Last Visit 1/08/2020:  Patient Instructions   Follow up with me regarding the specific type of thyroid cancer in family history.  Follow up with nutritionist.       HISTORY OF PRESENT ILLNESS (HPI):    Patient returns today for medical weight loss follow-up visit. Patient was last seen by me on 1/14/2020 and has lost 17 pounds and 1 % body fat.      She saw the nutritionist and is making changes with portions and sweets.  She is eating more protein.      She is on 1.8 mg daily.  The patient denies abdominal or flank pain, anorexia, nausea or vomiting, dysphagia, change in bowel habits or black or bloody stools.     MEDICATIONS:   Current Outpatient Medications   Medication Sig Dispense Refill     chlorthalidone (HYGROTON) 25 MG tablet Take 0.5 tablets (12.5 mg) by mouth daily 15 tablet 1     cyclobenzaprine (FLEXERIL) 5 MG tablet Take 1 tablet (5 mg) by mouth 3 times daily as needed for muscle spasms 18 tablet 0     insulin pen needle (31G X 8 MM) 31G X 8 MM miscellaneous Use 1 pen needles daily or as directed. 100 each 0     liraglutide - Weight Management (SAXENDA) 18 MG/3ML pen Inject 0.6 mg Subcutaneous daily for 7 days, THEN 1.2 mg daily for 7 days, THEN 1.8 mg daily for 7 days, THEN 2.4 mg daily for 7 days, THEN 3 mg daily. 3 pen 0     multivitamin w/minerals (MULTI-VITAMIN) tablet Take 1 tablet by mouth daily       order for DME Equipment being ordered: CPAP, mask and tubing 1 Device 0     ORDER FOR DME Replacement C-pap and heater (S9 Auto Set) 1 Device 0     sertraline (ZOLOFT) 100 MG tablet Take 1 tablet (100 mg) by mouth daily Take 100mg daily 90 tablet 0     vitamin D2 (ERGOCALCIFEROL)  12586 units (1250 mcg) capsule TAKE 1 CAPSULE BY MOUTH ONCE A WEEK  1       ALLERGIES:   Allergies   Allergen Reactions     Latex Rash     Contrast Dye Hives     Diatrizoate      Welt     No Clinical Screening - See Comments Hives     Pseudoephedrine Hives     Welts     Sudafed [Fd&C Red #40-Fd&C Yellow #6-Pse] Hives     Sulfa Drugs Hives     Topamax [Topiramate] Other (See Comments)     At 50 mg twice daily had cognitive impairment     ROS   ROS: 10 point ROS neg other than the symptoms noted above in the HPI.    PHYSICAL EXAMINATION:    VITALS: [facility] 11/01/2011   GENERAL: Patient is a 59 year old year old female  in no acute distress.  Patient is alert and orientated x 4, pleasant and cooperative with exam.     GENERAL APPEARANCE: healthy, alert and no distress  RESP: lungs clear to auscultation - no rales, rhonchi or wheezes  CV: regular rates and rhythm and normal S1 S2, no S3 or S4  PSYCH: mentation appears normal. and affect normal/bright  The abdomen is soft without tenderness, guarding, mass, rebound or organomegaly. Bowel sounds are normal.   Trace bilateral pitting edema     PSYCH: mentation appears normal, affect normal and bright.    LAB RESULTS:   Reviewed in Epic    ASSESSMENT/PLAN:    1. Morbid obesity due to excess calories (H)  Doing well with saxenda with no side effects.  Will need creatinine at follow up visit.         Patient is to call the clinic if she experiences any adverse reactions.           Patient Instructions   We will do labs at your follow up appointment.      Shelby Yan MD  Internal Medicine    American Board of Obesity Medicine Diplomate

## 2020-02-12 ENCOUNTER — OFFICE VISIT (OUTPATIENT)
Dept: INTERNAL MEDICINE | Facility: CLINIC | Age: 60
End: 2020-02-12
Payer: COMMERCIAL

## 2020-02-12 VITALS
BODY MASS INDEX: 47.09 KG/M2 | RESPIRATION RATE: 16 BRPM | SYSTOLIC BLOOD PRESSURE: 132 MMHG | OXYGEN SATURATION: 98 % | TEMPERATURE: 98 F | HEIGHT: 66 IN | DIASTOLIC BLOOD PRESSURE: 78 MMHG | HEART RATE: 85 BPM | WEIGHT: 293 LBS

## 2020-02-12 DIAGNOSIS — E66.01 MORBID OBESITY DUE TO EXCESS CALORIES (H): Primary | ICD-10-CM

## 2020-02-12 PROCEDURE — 99213 OFFICE O/P EST LOW 20 MIN: CPT | Performed by: INTERNAL MEDICINE

## 2020-02-12 ASSESSMENT — MIFFLIN-ST. JEOR: SCORE: 1934.6

## 2020-02-12 ASSESSMENT — PAIN SCALES - GENERAL: PAINLEVEL: NO PAIN (0)

## 2020-02-24 ENCOUNTER — TELEPHONE (OUTPATIENT)
Dept: FAMILY MEDICINE | Facility: CLINIC | Age: 60
End: 2020-02-24

## 2020-02-24 DIAGNOSIS — E55.9 VITAMIN D DEFICIENCY: Primary | ICD-10-CM

## 2020-02-24 RX ORDER — ERGOCALCIFEROL 1.25 MG/1
CAPSULE, LIQUID FILLED ORAL
Qty: 8 CAPSULE | Refills: 0 | Status: SHIPPED | OUTPATIENT
Start: 2020-02-24 | End: 2020-04-07

## 2020-02-24 NOTE — TELEPHONE ENCOUNTER
Reason for Call:  Medication or medication refill:    Do you use a [facility]?  Name of the pharmacy and phone number for the current request:  [facility] in [place] (pended)    Name of the medication requested: Vitamin D    Other request: none    Can we leave a detailed message on this number? YES    Phone number patient can be reached at: Home number on file [phone] (home)    Best Time: anytime    Call taken on 2/24/2020 at 9:12 AM by Jean Claude Bolton

## 2020-02-24 NOTE — TELEPHONE ENCOUNTER
Routing refill request to provider for review/approval because:  Medication is reported/historical    Shiloh Anderson RN, BSN, PHN  [facility] [place]

## 2020-02-24 NOTE — TELEPHONE ENCOUNTER
Referral for sleep evaluation faxed to [facility] clinic of Neurology Fax # [phone]    Jean Claude Bolton

## 2020-03-04 NOTE — TELEPHONE ENCOUNTER
"Routing refill request to provider for review/approval because:  Labs not current:  Microalbumin, diagnosis     Requested Prescriptions   Pending Prescriptions Disp Refills     liraglutide - Weight Management (SAXENDA) 18 MG/3ML pen 3 pen 0     Sig: Inject 0.6 mg Subcutaneous daily for 7 days, THEN 1.2 mg daily for 7 days, THEN 1.8 mg daily for 7 days, THEN 2.4 mg daily for 7 days, THEN 3 mg daily.       GLP-1 Agonists Protocol Failed - 3/3/2020  8:52 AM        Failed - Microalbumin on file in past 12 months     No lab results found.          Failed - Order for Saxenda with diagnosis of diabetes        Passed - Blood pressure less than 140/90 in past 6 months     BP Readings from Last 3 Encounters:   02/12/20 132/78   01/24/20 (!) 142/78   01/08/20 (!) 144/82                 Passed - LDL on file in past 12 months     Recent Labs   Lab Test 01/07/20  0900   *             Passed - HgbA1C in past 3 or 6 months     If HgbA1C is 8 or greater, it needs to be on file within the past 3 months.  If less than 8, must be on file within the past 6 months.     Recent Labs   Lab Test 01/07/20  0900   A1C 5.5             Passed - Medication is active on med list        Passed - Patient is age 18 or older        Passed - No active pregnancy on record        Passed - Normal serum creatinine on file in past 12 months     Recent Labs   Lab Test 11/15/19  1031   CR 0.94             Passed - No positive pregnancy test in past 12 months        Passed - Recent (6 mo) or future (30 days) visit within the authorizing provider's specialty     Patient had office visit in the last 6 months or has a visit in the next 30 days with authorizing provider.  See \"Patient Info\" tab in inbasket, or \"Choose Columns\" in Meds & Orders section of the refill encounter.            "

## 2020-03-16 ENCOUNTER — TELEPHONE (OUTPATIENT)
Dept: EDUCATION SERVICES | Facility: CLINIC | Age: 60
End: 2020-03-16

## 2020-03-16 NOTE — TELEPHONE ENCOUNTER
Ludmila is scheduled for non-urgent nutrition visit on 3/20 at [place].   Called and left message:  Cheyenne at [facility] calling, you have an appointment scheduled Fri at [place] at 8 AM.  We need to cancel the in-person visit to limit exposure and risk. I'm sorry for the inconvenience!    We can do either of 2 things, you can call to cancel that appointment and reschedule after the 4th of July, or we can change it to a phone visit and chat on the phone rather than in person.     If you would like to cancel and reschedule (provided #).   If you would like me to change it to a phone visit and call you at 8, please call and let me know (provided my desk #).     Do not come to clinic on Friday.     Called Ludmila back with update:  We will call you for a phone appointment Friday at 8 unless you cancel. Thank you!    Thank you! Jackeline Munoz RD, LD, CDE

## 2020-03-20 ENCOUNTER — OFFICE VISIT (OUTPATIENT)
Dept: NUTRITION | Facility: CLINIC | Age: 60
End: 2020-03-20
Payer: COMMERCIAL

## 2020-03-20 DIAGNOSIS — E66.01 MORBID OBESITY DUE TO EXCESS CALORIES (H): Primary | ICD-10-CM

## 2020-03-20 PROCEDURE — 98968 PH1 ASSMT&MGMT NQHP 21-30: CPT

## 2020-03-20 NOTE — PROGRESS NOTES
Medical Nutrition Therapy  Visit Type:Reassessment and intervention    Ludmila Sanchez presents today for MNT and education related to weight management.   This was a telephone visit.     ASSESSMENT:   Patient comments/concerns relating to nutrition: Ludmila feels things are going well.  Says she has been losing weight and working with Dr. Yan and says she is down 20 lbs.  Says she is trying to do smaller portions at meals and has cut back on eating out and drinking pop.  Found the same meal ideas helpful last time.     Says right now her boss is awaiting test results for COVID-19 so she is currently self-quarantining.  She is trying to keep to her work schedule as much as possible.  She tries to eat protein with meals and to increase her activity, walk down and back up the stairs when doing laundry instead of staying downstairs until the load is done.  Says may go for a walk with the nicer weather.    Overall, feels like things are going well.  Her only struggle is sometimes craving sugar. She will try to satisfy this by eating 1 slice of bread with butter and honey or with peanut butter or cheese. Sometimes will buy M&M's but limits herself to the small bag.    NUTRITION HISTORY:  Wakes: 4-5pm  Meal 1: 5pm: fruit, veggies and meat  Meal 2: 3am: hard boiled egg, fruits and vegetables   Meal 3: 6am: oatmeal  Snacks: celery with peanut butter OR 1 slice bread with either butter and honey, peanut butter or cheese OR small bag M&M's on occasion if craving chocolate  Beverages: Pop (Regular) 1x/week, Coffee 3 cups/day and Water all day    Misses meals? none  Eats out:  Seldom.  Says tried Chinese Food once and it went right through her so she stopped eating out    Previous diet education:  Yes     Food allergies/intolerances: none  Dislikes: Marana sprouts    Diet is high in: carbs at some sittings  Diet is low in: protein    EXERCISE: Says less with staying at home but has been purposely adding more movement as able.   "Says will do the stairs more with laundry and is thinking about walking with the nicer weather.    SOCIO/ECONOMIC:   Lives with: grandchildren    MEDICATIONS:  Current Outpatient Medications   Medication     chlorthalidone (HYGROTON) 25 MG tablet     insulin pen needle (31G X 8 MM) 31G X 8 MM miscellaneous     multivitamin w/minerals (MULTI-VITAMIN) tablet     order for DME     ORDER FOR DME     sertraline (ZOLOFT) 100 MG tablet     vitamin D2 (ERGOCALCIFEROL) 76131 units (1250 mcg) capsule     No current facility-administered medications for this visit.        LABS:  Last Basic Metabolic Panel:  Lab Results   Component Value Date     11/15/2019      Lab Results   Component Value Date    POTASSIUM 4.1 11/15/2019     Lab Results   Component Value Date    CHLORIDE 106 11/15/2019     Lab Results   Component Value Date    DENISE 8.7 11/15/2019     Lab Results   Component Value Date    CO2 30 11/15/2019     Lab Results   Component Value Date    BUN 13 11/15/2019     Lab Results   Component Value Date    CR 0.94 11/15/2019     Lab Results   Component Value Date     11/15/2019       ANTHROPOMETRICS:  Vitals: LMP 11/01/2011   Estimated body mass index is 49.16 kg/m  as calculated from the following:    Height as of 2/12/20: 1.664 m (5' 5.5\").    Weight as of 2/12/20: 136.1 kg (300 lb).        Wt Readings from Last 5 Encounters:   02/12/20 136.1 kg (300 lb)   01/29/20 141 kg (310 lb 12.8 oz)   01/24/20 140.9 kg (310 lb 9.6 oz)   01/08/20 144 kg (317 lb 8 oz)   01/07/20 146.1 kg (322 lb)       Weight Change: Lost 10.8 lbs in 2 weeks per last recorded weight on 2/12/20    ESTIMATED KCAL REQUIREMENTS:  2329 kcal per day    NUTRITION DIAGNOSIS: Overweight/Obesity related to prior excessive energy intake from frequent sweets as evidenced by BMI>40    NUTRITION INTERVENTION:  Nutrition Prescription: Energy Intake: 2462-7577 kcal/day for weight loss   Education given to support: general nutrition guidelines, weight " reduction, consistent meals, exercise, fiber, behavior modification, portion control, heart healthy diet and healthy protein  Motivational Interviewing    Discussion: Commended Ludmila on her changes to be more mindful about what she eats and how much, reducing portion sizes and cutting back on eating out and simple sugars. Suspect these changes have helped her lose 20 lbs in the past 3 weeks.     She admits it is a struggle with cravings sweets at times but reassured her that eating 1 slice of bread with either small amount honey and butter, peanut butter or cheese is a health, reasonable snack, especially if able to eat a whole grain slice of bread.  She says only occasionally having small bag of M&M's and again, commended her on limiting the size and frequency so not providing excess calories daily and limiting weight loss success.     Reviewed diet recall and commended her on eating more fruits and vegetables and trying to include protein with meals.  She is currently self-quarantined at home and while trying to do more stairs, encouraged her to continue to add/increase activity around the house to try to continue weight loss progress.  She indicates she is busy on her feet when working.  Pt verbalized understanding of concepts discussed and recommendations provided.       PATIENT'S BEHAVIOR CHANGE GOALS:   See Patient Instructions for patient stated behavior change goals. AVS created and will send through [employer].    MONITOR / EVALUATE:  RD will monitor/evaluate:  Food / Beverage / Nutrient intake   Weight change    FOLLOW-UP:  Call RD with questions/concerns.   Follow-up appointment scheduled on 4/24/20 by either appointment or telephone encounter as needed if unsafe to return to clinic with covid-19.     Syeda Joseph RD, LD, CDE   Time spent in minutes: 25 minutes  Encounter: Individual TELEPHONE visit

## 2020-03-20 NOTE — PATIENT INSTRUCTIONS
1. Continue to be mindful when eating and continue to limit portion size at a sitting to help with portion control.    2. Continue to increase daily activity as able- this tends to help your body burn more fat and hold onto muscle as you lose weight.    3. Continue to include some protein with meals.  If you need something more with oatmeal, can try making with milk (or adding later if you like it) and/or add a few nuts or mix in small amount of peanut butter.    FOLLOW UP (in clinic if safe otherwise by phone): Friday, April 24th at 8am at [facility]    Syeda Joseph RD, JODIE, CDE   [phone]

## 2020-04-04 DIAGNOSIS — E55.9 VITAMIN D DEFICIENCY: ICD-10-CM

## 2020-04-06 NOTE — TELEPHONE ENCOUNTER
Requested Prescriptions   Pending Prescriptions Disp Refills     vitamin D2 (ERGOCALCIFEROL) 72033 units (1250 mcg) capsule [Pharmacy Med Name: VITAMIN D2 50,000IU (ERGO) CAP RX]      Last Written Prescription Date:  2/24/2020  Last Fill Quantity: 8 capsule,   # refills: 0  Last Office Visit: 1/24/2020 w/ Otto, H  Future Office visit:       Routing refill request to provider for review/approval because:  Drug not on the [facility], P or [facility] refill protocol or controlled substance 8 capsule 0     Sig: TAKE 1 CAPSULE BY MOUTH 1 TIME A WEEK       There is no refill protocol information for this order

## 2020-04-07 RX ORDER — ERGOCALCIFEROL 1.25 MG/1
CAPSULE, LIQUID FILLED ORAL
Qty: 8 CAPSULE | Refills: 0 | Status: SHIPPED | OUTPATIENT
Start: 2020-04-07 | End: 2020-06-03

## 2020-04-07 NOTE — TELEPHONE ENCOUNTER
Refill approved.    Winnie Larios, Pharm.D., [facility]  Medication Therapy Management Pharmacist  [phone]

## 2020-04-15 ENCOUNTER — TELEPHONE (OUTPATIENT)
Dept: INTERNAL MEDICINE | Facility: CLINIC | Age: 60
End: 2020-04-15

## 2020-04-15 NOTE — TELEPHONE ENCOUNTER
Prior Authorization Approval    Authorization Effective Date: 4/15/2020  Authorization Expiration Date: 4/15/2021  Medication: SAXENDA 18 MG/3ML pen--APPROVED  Approved Dose/Quantity:   Reference #:     Insurance Company: [employer] ([facility]) - Phone [phone] Fax [phone]  Expected CoPay:       CoPay Card Available:      Foundation Assistance Needed:    Which Pharmacy is filling the prescription (Not needed for infusion/clinic administered): [facility] [address] [place] [address] [address]  Pharmacy Notified: Yes  Patient Notified: Yes **Instructed pharmacy to notify patient when script is ready to /ship.**

## 2020-04-15 NOTE — TELEPHONE ENCOUNTER
PA Initiation    Medication: SAXENDA 18 MG/3ML pen   Insurance Company: [employer] ([facility]) - Phone [phone] Fax [phone]  Pharmacy Filling the Rx: [facility] [address] [facility] [place] [facility] [address] [address] [facility]  Filling Pharmacy Phone: [phone]  Filling Pharmacy Fax: [phone]  Start Date: 4/15/2020

## 2020-06-10 NOTE — TELEPHONE ENCOUNTER
Medication is being filled for 1 time refill only due to:  Patient needs to be seen because due for appt. Has upcoming appt scheduled.

## 2020-06-10 NOTE — TELEPHONE ENCOUNTER
Due for office visit and lab per 2/12/20 encounter. Please call patient to schedule follow up.    Edilia Estrada, PharmD  Medication Therapy Management Pharmacist

## 2020-06-10 NOTE — TELEPHONE ENCOUNTER
Spoke to patient and scheduled appointment 6/24/2020.  Ashlee LAUREN CMA ([facility])
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
## 2020-06-24 ENCOUNTER — ANCILLARY PROCEDURE (OUTPATIENT)
Dept: GENERAL RADIOLOGY | Facility: CLINIC | Age: 60
End: 2020-06-24
Attending: NURSE PRACTITIONER
Payer: COMMERCIAL

## 2020-06-24 ENCOUNTER — OFFICE VISIT (OUTPATIENT)
Dept: FAMILY MEDICINE | Facility: CLINIC | Age: 60
End: 2020-06-24
Payer: COMMERCIAL

## 2020-06-24 VITALS
SYSTOLIC BLOOD PRESSURE: 114 MMHG | DIASTOLIC BLOOD PRESSURE: 78 MMHG | HEIGHT: 66 IN | WEIGHT: 293 LBS | HEART RATE: 98 BPM | TEMPERATURE: 97.7 F | OXYGEN SATURATION: 99 % | BODY MASS INDEX: 47.09 KG/M2 | RESPIRATION RATE: 17 BRPM

## 2020-06-24 DIAGNOSIS — M79.671 PAIN OF RIGHT HEEL: ICD-10-CM

## 2020-06-24 DIAGNOSIS — Z79.899 HIGH RISK MEDICATION USE: ICD-10-CM

## 2020-06-24 LAB
ANION GAP SERPL CALCULATED.3IONS-SCNC: 4 MMOL/L (ref 3–14)
BUN SERPL-MCNC: 11 MG/DL (ref 7–30)
CALCIUM SERPL-MCNC: 9 MG/DL (ref 8.5–10.1)
CHLORIDE SERPL-SCNC: 107 MMOL/L (ref 94–109)
CO2 SERPL-SCNC: 29 MMOL/L (ref 20–32)
CREAT SERPL-MCNC: 0.92 MG/DL (ref 0.52–1.04)
GFR SERPL CREATININE-BSD FRML MDRD: 67 ML/MIN/{1.73_M2}
GLUCOSE SERPL-MCNC: 84 MG/DL (ref 70–99)
POTASSIUM SERPL-SCNC: 4.5 MMOL/L (ref 3.4–5.3)
SODIUM SERPL-SCNC: 140 MMOL/L (ref 133–144)

## 2020-06-24 PROCEDURE — 36415 COLL VENOUS BLD VENIPUNCTURE: CPT | Performed by: NURSE PRACTITIONER

## 2020-06-24 PROCEDURE — 80048 BASIC METABOLIC PNL TOTAL CA: CPT | Performed by: NURSE PRACTITIONER

## 2020-06-24 PROCEDURE — 99214 OFFICE O/P EST MOD 30 MIN: CPT | Performed by: NURSE PRACTITIONER

## 2020-06-24 PROCEDURE — 73630 X-RAY EXAM OF FOOT: CPT | Mod: RT

## 2020-06-24 ASSESSMENT — MIFFLIN-ST. JEOR: SCORE: 1906.92

## 2020-06-25 NOTE — RESULT ENCOUNTER NOTE
Dear Ludmila,    Your recent test results are attached.      Your x-ray shows a heel spur.  This may be contributing to your pain.  You also have findings of mild arthritis.  Please try heel lift as discussed in clinic.  If no improvement please contact me and I will place a referral for podiatry.    If you have any questions please feel free to contact [phone] or myself via [employer].    Sincerely,  Xochitl Weiss, CNP

## 2020-06-25 NOTE — RESULT ENCOUNTER NOTE
Dear Ludmila,    Your recent test results are attached.      Normal kidney function and electrolytes.      If you have any questions please feel free to contact [phone] or myself via [employer].    Sincerely,  Xochitl Weiss, CNP

## 2020-07-07 ENCOUNTER — MYC MEDICAL ADVICE (OUTPATIENT)
Dept: FAMILY MEDICINE | Facility: CLINIC | Age: 60
End: 2020-07-07

## 2020-07-07 DIAGNOSIS — M79.671 PAIN OF RIGHT HEEL: Primary | ICD-10-CM

## 2020-07-21 ENCOUNTER — OFFICE VISIT (OUTPATIENT)
Dept: PODIATRY | Facility: CLINIC | Age: 60
End: 2020-07-21
Attending: NURSE PRACTITIONER
Payer: COMMERCIAL

## 2020-07-21 VITALS
DIASTOLIC BLOOD PRESSURE: 77 MMHG | WEIGHT: 293 LBS | BODY MASS INDEX: 48.34 KG/M2 | HEART RATE: 88 BPM | SYSTOLIC BLOOD PRESSURE: 160 MMHG

## 2020-07-21 DIAGNOSIS — M79.671 PAIN OF RIGHT HEEL: ICD-10-CM

## 2020-07-21 PROCEDURE — 99203 OFFICE O/P NEW LOW 30 MIN: CPT | Performed by: PODIATRIST

## 2020-07-21 NOTE — NURSING NOTE
Weight management plan: Patient was referred to their PCP to discuss a diet and exercise plan.   Ludmila to follow up with Primary Care provider regarding elevated blood pressure.

## 2020-07-21 NOTE — PATIENT INSTRUCTIONS
We wish you continued good healing. If you have any questions or concerns, please do not hesitate to contact us at [phone]    Please remember to call and schedule a follow up appointment if one was recommended at your earliest convenience.   PODIATRY CLINIC HOURS  TELEPHONE NUMBER    Dr. Lexx Ruiz D.P.M [facility]    Clinics:  [facility]    [facility]   Tuesday 1PM-6PM  [place]  Wednesday 7AM-2PM  [facility]  Thursday 10AM-6PM  [place]  Friday 7AM-3PM  [place]  Specialty schedulers:   [phone] to make an appointment with any Specialty Provider.        Urgent Care locations:    [facility] Monday-Friday 5 pm - 9 pm. Saturday-Sunday 9 am -5pm    Monday-Friday 11 am - 9 pm Saturday 9 am - 5 pm     Monday-Sunday 12 noon-8PM [phone]     If you need a medication refill, please contact us you may need lab work and/or a follow up visit prior to your refill (i.e. Antifungal medications).    [facility] (secure e-mail communication and access to your chart) to send a message or to make an appointment.    If MRI needed please call [facility] at [phone]
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
## 2020-08-05 ENCOUNTER — TELEPHONE (OUTPATIENT)
Dept: FAMILY MEDICINE | Facility: CLINIC | Age: 60
End: 2020-08-05

## 2020-08-05 DIAGNOSIS — E55.9 VITAMIN D DEFICIENCY: ICD-10-CM

## 2020-08-05 NOTE — TELEPHONE ENCOUNTER
vitamin D2 (ERGOCALCIFEROL) 87326 units (1250 mcg) capsule  12 capsule  0  6/3/2020      Insurance will only cover a 90 day supply. Please send a new Rx

## 2020-08-05 NOTE — TELEPHONE ENCOUNTER
Called pharmacy to clarify as a 90 day supply was sent in. They stated the patient changed pharmacies and to disregard message.     Asia Jaime RN

## 2020-08-21 NOTE — LETTER
August 25, 2020      Ludmila Sanchez  [address] [address]        Dear Ludmila,       Your provider has sent a 30 day cosme refill of liraglutide - Weight Management (SAXENDA) 18 MG/3ML pen. You are due for an appointment for further refills. Please contact the clinic to schedule an appointment for further refills.       Sincerely,        JUANJOSE Hussein CNP

## 2020-08-21 NOTE — TELEPHONE ENCOUNTER
liraglutide - Weight Management (SAXENDA) 18 MG/3ML pen       Last Written Prescription Date:  06/24/2020  Last Fill Quantity: 4 Pen,   # refills: 2  Last Office Visit: 06/24/2020  Future Office visit:       Routing refill request to provider for review/approval because:  Drug not on the [facility], [facility] or [facility] refill protocol or controlled substance  An Herndon, CMA

## 2020-08-25 NOTE — TELEPHONE ENCOUNTER
"Routing refill request to provider for review/approval because:  Failed protocol - see below    Requested Prescriptions   Pending Prescriptions Disp Refills    liraglutide - Weight Management (SAXENDA) 18 MG/3ML pen 4 pen 2     Sig: Inject 2.4 mg Subcutaneous daily       GLP-1 Agonists Protocol Failed - 8/21/2020  1:56 PM        Failed - Order for Saxenda with diagnosis of diabetes        Failed - HgbA1C in past 3 or 6 months     If HgbA1C is 8 or greater, it needs to be on file within the past 3 months.  If less than 8, must be on file within the past 6 months.     Recent Labs   Lab Test 01/07/20  0900   A1C 5.5             Passed - Medication is active on med list        Passed - Patient is age 18 or older        Passed - No active pregnancy on record        Passed - Normal serum creatinine on file in past 12 months     Recent Labs   Lab Test 06/24/20  1312   CR 0.92       Ok to refill medication if creatinine is low          Passed - No positive pregnancy test in past 12 months        Passed - Recent (6 mo) or future (30 days) visit within the authorizing provider's specialty     Patient had office visit in the last 6 months or has a visit in the next 30 days with authorizing provider.  See \"Patient Info\" tab in inbasket, or \"Choose Columns\" in Meds & Orders section of the refill encounter.               Mayra Hayes RN  "

## 2020-08-25 NOTE — TELEPHONE ENCOUNTER
Letter sent. Ana Laura Nichole,       Schmitz, Tonia, MD  [employer]z [employer] Red 15 minutes ago (8:42 AM)      Make follow up appointment with provider

## 2020-09-01 ENCOUNTER — VIRTUAL VISIT (OUTPATIENT)
Dept: FAMILY MEDICINE | Facility: OTHER | Age: 60
End: 2020-09-01

## 2020-09-01 DIAGNOSIS — Z20.822 SUSPECTED 2019 NOVEL CORONAVIRUS INFECTION: Primary | ICD-10-CM

## 2020-09-01 NOTE — PROGRESS NOTES
"Date: 2020 09:09:08  Clinician: Russell Gonzalez  Clinician NPI: [id number]  Patient: Ludmila Sanchez  Patient : 1960  Patient Address: [address] [place] [address]  Patient Phone: [phone]  Visit Protocol: URI  Patient Summary:  Ludmila is a 60 year old ( : 1960 ) female who initiated a Visit for COVID-19 (Coronavirus) evaluation and screening. When asked the question \"Please sign me up to receive news, health information and promotions. \", Ludmila responded \"No\".    Ludmila states her symptoms started gradually 3-4 days ago. After her symptoms started, they improved and then got worse again.   Her symptoms consist of a headache, chills, malaise, a sore throat, a cough, nasal congestion, rhinitis, myalgia, and anosmia. She is experiencing difficulty breathing due to nasal congestion but she is not short of breath. Ludmila also feels feverish.   Symptom details     Nasal secretions: The color of her mucus is clear.    Cough: Ludmila coughs a few times an hour and her cough is not more bothersome at night. Phlegm does not come into her throat when she coughs. She believes her cough is caused by post-nasal drip.     Sore throat: Ludmila reports having mild throat pain (1-3 on a 10 point pain scale), does not have exudate on her tonsils, and can swallow liquids. The lymph nodes in her neck are not enlarged. A rash has not appeared on the skin since the sore throat started.     Temperature: Her current temperature is 100 degrees Fahrenheit.     Headache: She states the headache is mild (1-3 on a 10 point pain scale).      Ludmila denies having ear pain, enlarged lymph nodes, wheezing, teeth pain, ageusia, diarrhea, vomiting, nausea, and facial pain or pressure. She also denies having a sinus infection within the past year, having recent facial or sinus surgery in the past 60 days, and taking antibiotic medication in the past month.   Precipitating events  Within the past week, Ludmila has not been exposed " to someone with strep throat. She has not recently been exposed to someone with influenza. Ludmila has been in close contact with the following high risk individuals: people with asthma, heart disease or diabetes and adults 65 or older.   Pertinent COVID-19 (Coronavirus) information  In the past 14 days, Ludmila has not worked in a congregate living setting.   She does not work or volunteer as healthcare worker or a  and does not work or volunteer in a healthcare facility.   Ludmila also has not lived in a congregate living setting in the past 14 days. She does not live with a healthcare worker.   Ludmila has had a close contact with a laboratory-confirmed COVID-19 patient within 14 days of symptom onset.   Since December 2019, Ludmila and has not had upper respiratory infection or influenza-like illness. Has not been diagnosed with lab-confirmed COVID-19 test   Pertinent medical history  Ludmila does not get yeast infections when she takes antibiotics.   Ludmila needs a return to work/school note.   Weight: 300 lbs   Ludmila does not smoke or use smokeless tobacco.   Additional information as reported by the patient (free text): Yes need COVID test to return to work at [employer]   Weight: 300 lbs    MEDICATIONS: Zoloft oral, Saxenda subcutaneous, Vitamin D3 oral, ALLERGIES: Sudafed  Clinician Response:  Dear Ludmila,   Your symptoms show that you may have coronavirus (COVID-19). This illness can cause fever, cough and trouble breathing. Many people get a mild case and get better on their own. Some people can get very sick.  What should I do?  We would like to test you for this virus.   1. Please call [phone] to schedule your visit. Explain that [facility] have [facility] COVID-19 test. Be ready to share your [facility] visit ID number.  The following will serve as your written order for this COVID Test, ordered by me, for the indication of suspected COVID [Z20.828]: The test will be ordered in [facility], our  "electronic health record, after you are scheduled. It will show as ordered and authorized by Marcell Parsons MD.  Order: COVID-19 (Coronavirus) PCR for SYMPTOMATIC testing from [facility].      2. When it's time for your COVID test:  Stay at least 6 feet away from others. (If someone will drive you to your test, stay in the backseat, as far away from the  as you can.)   Cover your mouth and nose with a mask, tissue or washcloth.  Go straight to the testing site. Don't make any stops on the way there or back.      3.Starting now: Stay home and away from others (self-isolate) until:   You've had no fever---and no medicine that reduces fever---for one full day (24 hours). And...   Your other symptoms have gotten better. For example, your cough or breathing has improved. And...   At least 10 days have passed since your symptoms started.       During this time, don't leave the house except for testing or medical care.   Stay in your own room, even for meals. Use your own bathroom if you can.   Stay away from others in your home. No hugging, kissing or shaking hands. No visitors.  Don't go to work, school or anywhere else.    Clean \"high touch\" surfaces often (doorknobs, counters, handles, etc.). Use a household cleaning spray or wipes. You'll find a full list of  on the EPA website: www.epa.gov/pesticide-registration/list-n-disinfectants-use-against-sars-cov-2.   Cover your mouth and nose with a mask, tissue or washcloth to avoid spreading germs.  Wash your hands and face often. Use soap and water.  Caregivers in these groups are at risk for severe illness due to COVID-19:  o People 65 years and older  o People who live in a nursing home or long-term care facility  o People with chronic disease (lung, heart, cancer, diabetes, kidney, liver, immunologic)  o People who have a weakened immune system, including those who:   Are in cancer treatment  Take medicine that weakens the immune system, such as corticosteroids  " Had a bone marrow or organ transplant  Have an immune deficiency  Have poorly controlled HIV or AIDS  Are obese (body mass index of 40 or higher)  Smoke regularly   o Caregivers should wear gloves while washing dishes, handling laundry and cleaning bedrooms and bathrooms.  o Use caution when washing and drying laundry: Don't shake dirty laundry, and use the warmest water setting that you can.  o For more tips, go to www.cdc.gov/coronavirus/2019-ncov/downloads/10Things.pdf.    4.Sign up for [employer]. We know it's scary to hear that you might have COVID-19. We want to track your symptoms to make sure you're okay over the next 2 weeks. Please look for an email from [employer]---this is a free, online program that we'll use to keep in touch. To sign up, follow the link in the email. Learn more at [email or web address]  How can I take care of myself?   Get lots of rest. Drink extra fluids (unless a doctor has told you not to).   Take Tylenol (acetaminophen) for fever or pain. If you have liver or kidney problems, ask your family doctor if it's okay to take Tylenol.   Adults can take either:    650 mg (two 325 mg pills) every 4 to 6 hours, or...   1,000 mg (two 500 mg pills) every 8 hours as needed.    Note: Don't take more than 3,000 mg in one day. Acetaminophen is found in many medicines (both prescribed and over-the-counter medicines). Read all labels to be sure you don't take too much.   For children, check the Tylenol bottle for the right dose. The dose is based on the child's age or weight.    If you have other health problems (like cancer, heart failure, an organ transplant or severe kidney disease): Call your specialty clinic if you don't feel better in the next 2 days.       Know when to call 911. Emergency warning signs include:    Trouble breathing or shortness of breath Pain or pressure in the chest that doesn't go away Feeling confused like you haven't felt before, or not being able to wake  up Bluish-colored lips or face.  Where can I get more information?   [facility] -- About COVID-19: [email or web address]   CDC -- What to Do If You're Sick: www.cdc.gov/coronavirus/2019-ncov/about/steps-when-sick.html   [facility] [place][note 2] -- Ending Home Isolation: www.cdc.gov/coronavirus/2019-ncov/hcp/disposition-in-home-patients.html   [facility] [place][note 2] -- Caring for Someone: www.cdc.gov/coronavirus/2019-ncov/if-you-are-sick/care-for-someone.html   [facility] -- Interim Guidance for Hospital Discharge to Home: www.[facility].mn.us/diseases/coronavirus/hcp/hospdischarge.pdf   [facility] clinical trials (COVID-19 research studies): clinicalaffairs.[facility]-clinical-trials    Below are the COVID-19 hotlines at the [place] Department of Health ([facility]). Interpreters are available.    For health questions: Call 448-391-0419 or 1-117.493.7735 (7 a.m. to 7 p.m.) For questions about schools and childcare: Call 454-898-8622 or 1-411.627.8410 (7 a.m. to 7 p.m.)    Diagnosis: Other malaise  Diagnosis ICD: R53.81

## 2020-09-02 DIAGNOSIS — Z20.822 SUSPECTED 2019 NOVEL CORONAVIRUS INFECTION: ICD-10-CM

## 2020-09-02 PROCEDURE — U0003 INFECTIOUS AGENT DETECTION BY NUCLEIC ACID (DNA OR RNA); SEVERE ACUTE RESPIRATORY SYNDROME CORONAVIRUS 2 (SARS-COV-2) (CORONAVIRUS DISEASE [COVID-19]), AMPLIFIED PROBE TECHNIQUE, MAKING USE OF HIGH THROUGHPUT TECHNOLOGIES AS DESCRIBED BY CMS-2020-01-R: HCPCS | Performed by: FAMILY MEDICINE

## 2020-09-03 LAB
SARS-COV-2 RNA SPEC QL NAA+PROBE: NOT DETECTED
SPECIMEN SOURCE: NORMAL

## 2020-09-16 RX ORDER — LIRAGLUTIDE 6 MG/ML
INJECTION, SOLUTION SUBCUTANEOUS
Refills: 2 | OUTPATIENT
Start: 2020-09-16

## 2020-09-17 DIAGNOSIS — E55.9 VITAMIN D DEFICIENCY: ICD-10-CM

## 2020-09-18 RX ORDER — ERGOCALCIFEROL 1.25 MG/1
CAPSULE, LIQUID FILLED ORAL
Qty: 12 CAPSULE | Refills: 0 | Status: SHIPPED | OUTPATIENT
Start: 2020-09-18 | End: 2020-11-18

## 2020-09-18 NOTE — TELEPHONE ENCOUNTER
Routing refill request to provider for review/approval because:  Drug not on the [facility] refill protocol     Shiloh Anderson RN, BSN, PHN  [facility]: [place]

## 2020-09-18 NOTE — TELEPHONE ENCOUNTER
Never seen patient by this provider she should have followed up with pcp, Ange lopez? After being on med for 10 weeks .  Please call make appoint and refill until that time. As it appears it had been refilled multiple times under my name in the past. Refilled X1 then should be routed to her pcp.   Yovani Menjivar D.O.

## 2020-09-22 NOTE — TELEPHONE ENCOUNTER
Called patient and let her know that she needs to send her refill request to her PCP. It appears she has been seeing Xochitl Weiss at the [facility].    Raisa German

## 2020-10-06 ENCOUNTER — VIRTUAL VISIT (OUTPATIENT)
Dept: FAMILY MEDICINE | Facility: CLINIC | Age: 60
End: 2020-10-06
Payer: COMMERCIAL

## 2020-10-06 PROCEDURE — 99213 OFFICE O/P EST LOW 20 MIN: CPT | Mod: 95 | Performed by: NURSE PRACTITIONER

## 2020-10-06 NOTE — PROGRESS NOTES
"Ludmila Sanchez is a 60 year old female who is being evaluated via a billable telephone visit.      The patient has been notified of following:     \"This telephone visit will be conducted via a call between you and your physician/provider. We have found that certain health care needs can be provided without the need for a physical exam.  This service lets us provide the care you need with a short phone conversation.  If a prescription is necessary we can send it directly to your pharmacy.  If lab work is needed we can place an order for that and you can then stop by our lab to have the test done at a later time.    Telephone visits are billed at different rates depending on your insurance coverage. During this emergency period, for some insurers they may be billed the same as an in-person visit.  Please reach out to your insurance provider with any questions.    If during the course of the call the physician/provider feels a telephone visit is not appropriate, you will not be charged for this service.\"    Patient has given verbal consent for Telephone visit?  Yes    What phone number would you like to be contacted at? [phone]    How would you like to obtain your AVS? Harsha    Subjective     Ludmila Sanchez is a 60 year old female who presents via phone visit today for the following health issues:    HPI     Chief Complaint   Patient presents with     Weight Check     Patient notes that she has been rotating shifts at work and has been forgetting to take her liraglutide.  She has been setting an alarm this week and restarted medication at 0.6 mg this week.  She notes that weight has been maintaining.  She denies any side effects at 2.4 mg.    Patient notes that diet has not been good with changing work shifts.  She has been trying to focus on protein bars, fruit, veggies, cheese, sandwich with meat.  She feels that she is just grabbing food on the go.  Patient is not eating as much as she would normally.    She " is walking the dog twice daily for about a mile.  She continues to have plantar fasciitis and has follow-up planned with podiatry.        Review of Systems   Constitutional, HEENT, cardiovascular, pulmonary, gi and gu systems are negative, except as otherwise noted.       Objective          Vitals:  No vitals were obtained today due to virtual visit.    healthy, alert and no distress  PSYCH: Alert and oriented times 3; coherent speech, normal   rate and volume, able to articulate logical thoughts, able   to abstract reason, no tangential thoughts, no hallucinations   or delusions  Her affect is normal  RESP: No cough, no audible wheezing, able to talk in full sentences  Remainder of exam unable to be completed due to telephone visits            Assessment/Plan:    Assessment & Plan     BMI 50.0-59.9, adult (H)  Patient to increase dose weekly up to 3 mg daily.  Patient will work on increasing exercise and meal prepping as her schedule returns to normal.  - liraglutide - Weight Management (SAXENDA) 18 MG/3ML pen; Inject 3 mg Subcutaneous daily            Return in about 3 months (around 1/6/2021) for Weight Recheck.    JUANJOSE Hussein [facility]    Phone call duration:  8 minutes

## 2020-10-20 ENCOUNTER — OFFICE VISIT (OUTPATIENT)
Dept: PODIATRY | Facility: CLINIC | Age: 60
End: 2020-10-20
Payer: COMMERCIAL

## 2020-10-20 VITALS
BODY MASS INDEX: 48.34 KG/M2 | WEIGHT: 293 LBS | DIASTOLIC BLOOD PRESSURE: 80 MMHG | SYSTOLIC BLOOD PRESSURE: 136 MMHG | HEART RATE: 68 BPM

## 2020-10-20 DIAGNOSIS — M79.671 PAIN OF RIGHT HEEL: Primary | ICD-10-CM

## 2020-10-20 PROCEDURE — 99213 OFFICE O/P EST LOW 20 MIN: CPT | Performed by: PODIATRIST
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
## 2020-10-20 NOTE — PATIENT INSTRUCTIONS
We wish you continued good healing. If you have any questions or concerns, please do not hesitate to contact us at [phone]    RFE[facility]D (secure e-mail communication and access to your chart) to send a message or to make an appointment.    Please remember to call and schedule a follow up appointment if one was recommended at your earliest convenience.     +++OF MARCH 2020+++ LOCATION AND HOURS HAVE CHANGED    PLEASE CALL CLINICS TO VERIFY DAYS AND TIMES  PODIATRY CLINIC HOURS  TELEPHONE NUMBER    Dr. Lexx JAVIERPVINNIE [facility]        Clinics:  [facility]   Tuesday 1PM-6PM  [place]  Wednesday 745AM-330PM  [place]  Thursday/Friday 745AM-230PM  [place]     Specialty schedulers:   [phone] to make an appointment with any Specialty Provider.               If you need a medication refill, please contact us you may need lab work and/or a follow up visit prior to your refill (i.e. Antifungal medications).    If MRI needed please call Imaging at [phone]    Knee scooters can be picked up at ANY Medical Supply stores. For a list of suppliers please ask.
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
## 2020-11-07 ENCOUNTER — HEALTH MAINTENANCE LETTER (OUTPATIENT)
Age: 60
End: 2020-11-07

## 2020-11-30 ENCOUNTER — VIRTUAL VISIT (OUTPATIENT)
Dept: FAMILY MEDICINE | Facility: CLINIC | Age: 60
End: 2020-11-30
Payer: COMMERCIAL

## 2020-11-30 DIAGNOSIS — F31.31 BIPOLAR AFFECTIVE DISORDER, CURRENTLY DEPRESSED, MILD (H): ICD-10-CM

## 2020-11-30 DIAGNOSIS — R05.9 COUGH: Primary | ICD-10-CM

## 2020-11-30 DIAGNOSIS — E55.9 VITAMIN D DEFICIENCY: ICD-10-CM

## 2020-11-30 PROCEDURE — 99213 OFFICE O/P EST LOW 20 MIN: CPT | Mod: 95 | Performed by: FAMILY MEDICINE

## 2020-11-30 RX ORDER — SERTRALINE HYDROCHLORIDE 100 MG/1
100 TABLET, FILM COATED ORAL DAILY
Qty: 90 TABLET | Refills: 0 | Status: SHIPPED | OUTPATIENT
Start: 2020-11-30 | End: 2021-03-08

## 2020-11-30 RX ORDER — ERGOCALCIFEROL 1.25 MG/1
CAPSULE, LIQUID FILLED ORAL
Qty: 12 CAPSULE | Refills: 0 | Status: SHIPPED | OUTPATIENT
Start: 2020-11-30 | End: 2022-11-01

## 2020-11-30 NOTE — LETTER
[facility]  [address] [address]  Phone: [phone]    November 30, 2020        Ludmila Sanchez  [address] [address]          To whom it may concern:    RE: Ludmila Sanchez has been evaluated via telephone visit today and COVID-19 testing has been ordered.      According to CDC guidelines, she needs to remain out of work to isolate for 10 days from when symptoms started AND 72 hours after fever resolves (without fever reducing medications) AND improvement of respiratory symptoms (whichever is longer).    Her symptoms began on 11/14/20 and have significantly improved.  She never had a fever.  Please allow her to return to work effective 11/30/20.      Please contact me for questions or concerns.      Sincerely,        Marietta Quezada, DO

## 2020-11-30 NOTE — PATIENT INSTRUCTIONS
Instructions for Patients  It is recommended that you have a test for coronavirus (COVID-19). This illness can cause fever, cough and trouble breathing. Many people get a mild case and get better on their own. Some people can get very sick.     Please follow these steps:    1. We will call to schedule your test.  2. A member of our care team will ask you some questions. Then, they will use a swab to collect samples from your nose and throat.     Our testing team will send you your test results.    How can I protect others?    Stay home and away from others (self-isolate) until:    You ve had no fever--and no medicine that reduces fever--for 1 full day (24 hours). And      Your other symptoms have resolved (gotten better). For example, your cough or breathing has improved. And     At least 10 days have passed since your symptoms started.    Stay at least 6 feet away from others. (If someone will drive you to your test, stay in the backseat, as far away from the  as you can.)     Don t go to work, school or anywhere else. When it s time for your test, go straight to the testing site. Don t make any stops on the way there or back.     Wash your hands and face often. Use soap and water.     Cover your mouth and nose with a mask, tissue or washcloth.     Don t touch anyone. No hugging, kissing or handshakes.    How can I take care of myself?    1. Get lots of rest. Drink extra fluids (unless a doctor has told you not to).     2. Take Tylenol (acetaminophen) for fever or pain. If you have liver or kidney problems, ask your family doctor if it's okay to take Tylenol.     Adults can take either:     650 mg (two 325 mg pills) every 4 to 6 hours, or     1,000 mg (two 500 mg pills) every 8 hours as needed.     Note: Don't take more than 3,000 mg in one day.   Acetaminophen is found in many medicines (both prescribed and over-the-counter medicines). Read all labels to be sure you don't take too much.   For children,  check the Tylenol bottle for the right dose. The dose is based on  the child's age or weight.    3. If you have other health problems (like cancer, heart failure, an organ transplant or severe kidney disease): Call your specialty clinic if you don't feel better in the next 2 days.    4. Know when to call 911: If your breathing is so bad that it keeps you from doing normal activities, call 911 or go to the emergency room. Tell them that you've been staying home and may have COVID-19.      Thank you for limiting contact with others, wearing a simple mask to cover your cough, practice good hand hygiene habits and accessing our virtual services where possible to limit the spread of this virus.    For more information about COVID19 and options for caring for yourself at home, please visit the CDC website at https://www.cdc.gov/coronavirus/2019-ncov/about/steps-when-sick.html  For more options for care at [facility], please visit our website at [email or web address]

## 2020-11-30 NOTE — PROGRESS NOTES
"Ludmila Sanchez is a 60 year old female who is being evaluated via a billable telephone visit.      The patient has been notified of following:     \"This telephone visit will be conducted via a call between you and your physician/provider. We have found that certain health care needs can be provided without the need for a physical exam.  This service lets us provide the care you need with a short phone conversation.  If a prescription is necessary we can send it directly to your pharmacy.  If lab work is needed we can place an order for that and you can then stop by our lab to have the test done at a later time.    Telephone visits are billed at different rates depending on your insurance coverage. During this emergency period, for some insurers they may be billed the same as an in-person visit.  Please reach out to your insurance provider with any questions.    If during the course of the call the physician/provider feels a telephone visit is not appropriate, you will not be charged for this service.\"    Patient has given verbal consent for Telephone visit?  Yes    What phone number would you like to be contacted at? [phone]    How would you like to obtain your AVS? MyChart     ==============================================================    Subjective     Ludmila Sanchez is a 60 year old female who presents via phone visit today for the following health issues:    HPI     Acute Illness  Acute illness concerns: cough  Onset/Duration: Nov 14th  Symptoms:  Fever: no  Chills/Sweats: no  Headache (location?): YES  Sinus Pressure: no  Conjunctivitis:  no  Ear Pain: no  Rhinorrhea: YES- a little  Congestion: no  Sore Throat: no  Cough: YES-non-productive, a tickle in back of throat  Wheeze: no  Decreased Appetite: no  Nausea: no  Vomiting: no  Diarrhea: no, loose stools  Dysuria/Freq.: no  Dysuria or Hematuria: no  Fatigue/Achiness: no  Sick/Strep Exposure: YES- grandson  Therapies tried and outcome: OTC cough med, " "allergy med but has not helped    Of note, pt is a former smoker.  Has had a \"tickle\" in her throat, causing a cough.  Overall, her symptoms are improving, however the cough has persisted.  She has to have a COVID test before she can return to work.        Review of Systems   Constitutional, HEENT, cardiovascular, pulmonary, gi and gu systems are negative, except as otherwise noted.       Objective        Vitals:  No vitals were obtained today due to virtual visit.    healthy, alert and no distress  PSYCH: Alert and oriented times 3; coherent speech, normal   rate and volume, able to articulate logical thoughts, able   to abstract reason, no tangential thoughts, no hallucinations   or delusions  Her affect is normal  RESP: No cough, no audible wheezing, able to talk in full sentences  Remainder of exam unable to be completed due to telephone visits          Assessment/Plan:    Assessment & Plan     Cough  - Overall improving  - Patient needs a COVID test to return to work  - Wrote her a return to work letter   - Symptomatic COVID-19 Virus (Coronavirus) by PCR; Future    Bipolar affective disorder, currently depressed, mild (H)  - Stable, needs refills   - sertraline (ZOLOFT) 100 MG tablet; Take 1 tablet (100 mg) by mouth daily Take 100mg daily    Vitamin D deficiency  - STable, needs refills   - vitamin D2 (ERGOCALCIFEROL) 95257 units (1250 mcg) capsule; Weekly on e tab        Patient is overdue for a physical/labs.  She will come in for her physical within the next 1-3 months    Marietta Quezada [facility]    Phone call duration:  10 minutes              "

## 2020-12-01 ENCOUNTER — OFFICE VISIT (OUTPATIENT)
Dept: PODIATRY | Facility: CLINIC | Age: 60
End: 2020-12-01
Payer: COMMERCIAL

## 2020-12-01 VITALS — HEART RATE: 90 BPM | SYSTOLIC BLOOD PRESSURE: 142 MMHG | DIASTOLIC BLOOD PRESSURE: 80 MMHG

## 2020-12-01 DIAGNOSIS — M79.671 PAIN OF RIGHT HEEL: Primary | ICD-10-CM

## 2020-12-01 DIAGNOSIS — R05.9 COUGH: ICD-10-CM

## 2020-12-01 PROCEDURE — U0003 INFECTIOUS AGENT DETECTION BY NUCLEIC ACID (DNA OR RNA); SEVERE ACUTE RESPIRATORY SYNDROME CORONAVIRUS 2 (SARS-COV-2) (CORONAVIRUS DISEASE [COVID-19]), AMPLIFIED PROBE TECHNIQUE, MAKING USE OF HIGH THROUGHPUT TECHNOLOGIES AS DESCRIBED BY CMS-2020-01-R: HCPCS | Performed by: FAMILY MEDICINE

## 2020-12-01 PROCEDURE — 99213 OFFICE O/P EST LOW 20 MIN: CPT | Mod: 25 | Performed by: PODIATRIST

## 2020-12-01 PROCEDURE — 20550 NJX 1 TENDON SHEATH/LIGAMENT: CPT | Performed by: PODIATRIST
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
## 2020-12-02 LAB
SARS-COV-2 RNA SPEC QL NAA+PROBE: NOT DETECTED
SPECIMEN SOURCE: NORMAL

## 2021-01-15 ENCOUNTER — HEALTH MAINTENANCE LETTER (OUTPATIENT)
Age: 61
End: 2021-01-15

## 2021-02-28 DIAGNOSIS — F31.31 BIPOLAR AFFECTIVE DISORDER, CURRENTLY DEPRESSED, MILD (H): ICD-10-CM

## 2021-03-03 NOTE — TELEPHONE ENCOUNTER
PHQ-9 score:    PHQ 6/10/2016   PHQ-9 Total Score 5   Q9: Thoughts of better off dead/self-harm past 2 weeks Not at all       RN sent PHQ-9 to patient to complete, will await response.     Shiloh Anderson, RN, BSN, PHN  [facility]: [place]

## 2021-03-08 RX ORDER — SERTRALINE HYDROCHLORIDE 100 MG/1
TABLET, FILM COATED ORAL
Qty: 90 TABLET | Refills: 0 | Status: SHIPPED | OUTPATIENT
Start: 2021-03-08 | End: 2021-04-11

## 2021-03-08 NOTE — TELEPHONE ENCOUNTER
Routing refill request to provider for review/approval because:  PHQ-9 out of range.   RN sent Mychart to update, patient did not complete.     PHQ-9 score:    PHQ 6/10/2016   PHQ-9 Total Score 5   Q9: Thoughts of better off dead/self-harm past 2 weeks Not at all               Shiloh Anderson RN, BSN, PHN  [facility]: [place]

## 2021-04-01 ENCOUNTER — TELEPHONE (OUTPATIENT)
Dept: FAMILY MEDICINE | Facility: CLINIC | Age: 61
End: 2021-04-01

## 2021-04-01 NOTE — TELEPHONE ENCOUNTER
Prior Authorization Retail Medication Request    Medication/Dose: (renewal) - SAXENDA 18 MG/3ML pen  ICD code (if different than what is on RX):  BMI 50.0-59.9, adult (H) [Z68.43]   Previously Tried and Failed:    Rationale:      Insurance Name:  OPTUM_IRX  Insurance ID:  [id number]    Pharmacy Information (if different than what is on RX)  Name:  [id number] - [place] [address] AT [address] [facility]  Phone:  [phone]

## 2021-04-01 NOTE — TELEPHONE ENCOUNTER
Prior Authorization Approval    Authorization Effective Date: 4/1/2021  Authorization Expiration Date: 4/1/2022  Medication: (renewal) - SAXENDA 18 MG/3ML pen-APPROVED  Approved Dose/Quantity:   Reference #:     Insurance Company: [employer] ([facility]) - Phone [phone] Fax [phone]  Expected CoPay:       CoPay Card Available:      Foundation Assistance Needed:    Which Pharmacy is filling the prescription (Not needed for infusion/clinic administered): [id number] - [place] [address] AT [address] [facility]  Pharmacy Notified: Yes  Patient Notified: No    Pharmacy will notify patient when medication is ready.

## 2021-04-01 NOTE — TELEPHONE ENCOUNTER
Central Prior Authorization Team   Phone: [phone]      PA Initiation    Medication: (renewal) - SAXENDA 18 MG/3ML pen-Initiated  Insurance Company: [employer] ([facility]) - Phone [phone] Fax [phone]  Pharmacy Filling the Rx: [id number] - [place] - [address] [facility]  Filling Pharmacy Phone: [phone]  Filling Pharmacy Fax:    Start Date: 4/1/2021

## 2021-07-24 DIAGNOSIS — F31.31 BIPOLAR AFFECTIVE DISORDER, CURRENTLY DEPRESSED, MILD (H): ICD-10-CM

## 2021-07-26 NOTE — TELEPHONE ENCOUNTER
Routing to team- please call pt long over due for annual visit per providers last note.     Maryam Stafford RN

## 2021-07-30 RX ORDER — SERTRALINE HYDROCHLORIDE 100 MG/1
TABLET, FILM COATED ORAL
Qty: 90 TABLET | Refills: 0 | OUTPATIENT
Start: 2021-07-30

## 2021-07-30 NOTE — TELEPHONE ENCOUNTER
Patient is scheduled for physical at beginning of August.     Daniella Couch,   [facility]

## 2021-07-30 NOTE — TELEPHONE ENCOUNTER
I saw this patient once for her preop physical.  She is long overdue for a clinic visit I cannot refill her medications.

## 2021-08-19 ENCOUNTER — OFFICE VISIT (OUTPATIENT)
Dept: FAMILY MEDICINE | Facility: CLINIC | Age: 61
End: 2021-08-19
Payer: COMMERCIAL

## 2021-08-19 VITALS
TEMPERATURE: 98 F | SYSTOLIC BLOOD PRESSURE: 134 MMHG | HEART RATE: 92 BPM | BODY MASS INDEX: 50.02 KG/M2 | HEIGHT: 64 IN | WEIGHT: 293 LBS | OXYGEN SATURATION: 98 % | DIASTOLIC BLOOD PRESSURE: 74 MMHG

## 2021-08-19 DIAGNOSIS — Z12.11 COLON CANCER SCREENING: ICD-10-CM

## 2021-08-19 DIAGNOSIS — Z12.4 CERVICAL CANCER SCREENING: ICD-10-CM

## 2021-08-19 DIAGNOSIS — E66.01 MORBID OBESITY DUE TO EXCESS CALORIES (H): ICD-10-CM

## 2021-08-19 DIAGNOSIS — Z11.3 SCREEN FOR STD (SEXUALLY TRANSMITTED DISEASE): ICD-10-CM

## 2021-08-19 DIAGNOSIS — Z00.00 ROUTINE GENERAL MEDICAL EXAMINATION AT A HEALTH CARE FACILITY: Primary | ICD-10-CM

## 2021-08-19 DIAGNOSIS — Z12.31 ENCOUNTER FOR SCREENING MAMMOGRAM FOR BREAST CANCER: ICD-10-CM

## 2021-08-19 LAB — HBA1C MFR BLD: 5.5 % (ref 0–5.6)

## 2021-08-19 PROCEDURE — 83036 HEMOGLOBIN GLYCOSYLATED A1C: CPT | Performed by: FAMILY MEDICINE

## 2021-08-19 PROCEDURE — G0145 SCR C/V CYTO,THINLAYER,RESCR: HCPCS | Performed by: FAMILY MEDICINE

## 2021-08-19 PROCEDURE — 36415 COLL VENOUS BLD VENIPUNCTURE: CPT | Performed by: FAMILY MEDICINE

## 2021-08-19 PROCEDURE — 80053 COMPREHEN METABOLIC PANEL: CPT | Performed by: FAMILY MEDICINE

## 2021-08-19 PROCEDURE — 86803 HEPATITIS C AB TEST: CPT | Performed by: FAMILY MEDICINE

## 2021-08-19 PROCEDURE — 87624 HPV HI-RISK TYP POOLED RSLT: CPT | Performed by: FAMILY MEDICINE

## 2021-08-19 PROCEDURE — 87591 N.GONORRHOEAE DNA AMP PROB: CPT | Performed by: FAMILY MEDICINE

## 2021-08-19 PROCEDURE — 80061 LIPID PANEL: CPT | Performed by: FAMILY MEDICINE

## 2021-08-19 PROCEDURE — 90471 IMMUNIZATION ADMIN: CPT | Performed by: FAMILY MEDICINE

## 2021-08-19 PROCEDURE — 87491 CHLMYD TRACH DNA AMP PROBE: CPT | Performed by: FAMILY MEDICINE

## 2021-08-19 PROCEDURE — 87389 HIV-1 AG W/HIV-1&-2 AB AG IA: CPT | Performed by: FAMILY MEDICINE

## 2021-08-19 PROCEDURE — 90750 HZV VACC RECOMBINANT IM: CPT | Performed by: FAMILY MEDICINE

## 2021-08-19 PROCEDURE — 99396 PREV VISIT EST AGE 40-64: CPT | Mod: 25 | Performed by: FAMILY MEDICINE

## 2021-08-19 ASSESSMENT — ENCOUNTER SYMPTOMS
SHORTNESS OF BREATH: 0
DIZZINESS: 0
FREQUENCY: 0
BREAST MASS: 0
WEAKNESS: 0
EYE PAIN: 0
PALPITATIONS: 0
DYSURIA: 0
NERVOUS/ANXIOUS: 0
DIARRHEA: 0
HEADACHES: 0
JOINT SWELLING: 0
ARTHRALGIAS: 0
HEARTBURN: 0
MYALGIAS: 0
NAUSEA: 0
PARESTHESIAS: 0
COUGH: 0
HEMATOCHEZIA: 0
FEVER: 0
SORE THROAT: 0
CONSTIPATION: 0
CHILLS: 0
ABDOMINAL PAIN: 0
HEMATURIA: 0

## 2021-08-19 ASSESSMENT — MIFFLIN-ST. JEOR: SCORE: 1956.41
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
## 2021-08-20 LAB
ALBUMIN SERPL-MCNC: 3.9 G/DL (ref 3.4–5)
ALP SERPL-CCNC: 105 U/L (ref 40–150)
ALT SERPL W P-5'-P-CCNC: 29 U/L (ref 0–50)
ANION GAP SERPL CALCULATED.3IONS-SCNC: <1 MMOL/L (ref 3–14)
AST SERPL W P-5'-P-CCNC: 18 U/L (ref 0–45)
BILIRUB SERPL-MCNC: 0.3 MG/DL (ref 0.2–1.3)
BUN SERPL-MCNC: 18 MG/DL (ref 7–30)
CALCIUM SERPL-MCNC: 9.4 MG/DL (ref 8.5–10.1)
CHLORIDE BLD-SCNC: 108 MMOL/L (ref 94–109)
CHOLEST SERPL-MCNC: 252 MG/DL
CO2 SERPL-SCNC: 32 MMOL/L (ref 20–32)
CREAT SERPL-MCNC: 0.99 MG/DL (ref 0.52–1.04)
FASTING STATUS PATIENT QL REPORTED: NO
GFR SERPL CREATININE-BSD FRML MDRD: 61 ML/MIN/1.73M2
GLUCOSE BLD-MCNC: 104 MG/DL (ref 70–99)
HCV AB SERPL QL IA: NONREACTIVE
HDLC SERPL-MCNC: 46 MG/DL
HIV 1+2 AB+HIV1 P24 AG SERPL QL IA: NONREACTIVE
LDLC SERPL CALC-MCNC: 172 MG/DL
NONHDLC SERPL-MCNC: 206 MG/DL
POTASSIUM BLD-SCNC: 4.3 MMOL/L (ref 3.4–5.3)
PROT SERPL-MCNC: 7.4 G/DL (ref 6.8–8.8)
SODIUM SERPL-SCNC: 140 MMOL/L (ref 133–144)
TRIGL SERPL-MCNC: 170 MG/DL

## 2021-08-21 LAB
C TRACH DNA SPEC QL NAA+PROBE: NEGATIVE
N GONORRHOEA DNA SPEC QL NAA+PROBE: NEGATIVE

## 2021-08-23 DIAGNOSIS — E78.5 HYPERLIPIDEMIA, UNSPECIFIED HYPERLIPIDEMIA TYPE: Primary | ICD-10-CM

## 2021-08-23 RX ORDER — ATORVASTATIN CALCIUM 10 MG/1
10 TABLET, FILM COATED ORAL DAILY
Qty: 90 TABLET | Refills: 1 | Status: SHIPPED | OUTPATIENT
Start: 2021-08-23 | End: 2022-09-14

## 2021-08-24 LAB
BKR LAB AP GYN ADEQUACY: NORMAL
BKR LAB AP GYN INTERPRETATION: NORMAL
BKR LAB AP HPV REFLEX: NORMAL
BKR LAB AP PREVIOUS ABNORMAL: NORMAL
PATH REPORT.COMMENTS IMP SPEC: NORMAL
PATH REPORT.RELEVANT HX SPEC: NORMAL

## 2021-08-26 LAB
HUMAN PAPILLOMA VIRUS 16 DNA: NEGATIVE
HUMAN PAPILLOMA VIRUS 18 DNA: NEGATIVE
HUMAN PAPILLOMA VIRUS FINAL DIAGNOSIS: NORMAL
HUMAN PAPILLOMA VIRUS OTHER HR: NEGATIVE

## 2021-09-05 ENCOUNTER — HEALTH MAINTENANCE LETTER (OUTPATIENT)
Age: 61
End: 2021-09-05

## 2021-09-22 ENCOUNTER — ANCILLARY PROCEDURE (OUTPATIENT)
Dept: MAMMOGRAPHY | Facility: CLINIC | Age: 61
End: 2021-09-22
Attending: FAMILY MEDICINE
Payer: COMMERCIAL

## 2021-09-22 DIAGNOSIS — Z12.31 ENCOUNTER FOR SCREENING MAMMOGRAM FOR BREAST CANCER: ICD-10-CM

## 2021-09-22 PROCEDURE — 77067 SCR MAMMO BI INCL CAD: CPT | Mod: TC | Performed by: RADIOLOGY

## 2021-09-30 ENCOUNTER — TELEPHONE (OUTPATIENT)
Dept: FAMILY MEDICINE | Facility: CLINIC | Age: 61
End: 2021-09-30

## 2021-09-30 RX ORDER — LIRAGLUTIDE 6 MG/ML
INJECTION, SOLUTION SUBCUTANEOUS
OUTPATIENT
Start: 2021-09-30

## 2021-09-30 NOTE — TELEPHONE ENCOUNTER
Per chart review, patient saw Xochitl Weiss for weight management last on 10/06/2020 via virtual visit and was prescribed Liraglutide 3mg Rx.    Follow up at time of visit :  Return in about 3 months (around 1/6/2021) for Weight Recheck    Does not appear that patient followed up since.       Patient was given refill by covering provider on 9/3/21.      Routing to Xochitl Weiss to review - agreeable to still seeing this patient and providing another refill as requested? Or prefer to refer elsewhere for ongoing management?    PCP does not do weight management medications. Please advise.    Shiloh Anderson, RN, BSN, PHN  [facility]: [place]

## 2021-09-30 NOTE — TELEPHONE ENCOUNTER
I don't do weight management medication.  She was previously prescribed this by Dr. Yan.  Needs to get back in with her.

## 2021-09-30 NOTE — TELEPHONE ENCOUNTER
I am covering once again on the day she is requesting refill    I am happy to give 1 month refill for now    Patient needs to be seen, offer to help schedule with Arnulfo Perez sometime in October.     Thanks.

## 2021-10-20 ENCOUNTER — E-VISIT (OUTPATIENT)
Dept: URGENT CARE | Facility: CLINIC | Age: 61
End: 2021-10-20
Payer: COMMERCIAL

## 2021-10-20 ENCOUNTER — LAB (OUTPATIENT)
Dept: URGENT CARE | Facility: URGENT CARE | Age: 61
End: 2021-10-20
Attending: PHYSICIAN ASSISTANT
Payer: COMMERCIAL

## 2021-10-20 DIAGNOSIS — Z20.822 SUSPECTED COVID-19 VIRUS INFECTION: ICD-10-CM

## 2021-10-20 DIAGNOSIS — Z20.822 SUSPECTED COVID-19 VIRUS INFECTION: Primary | ICD-10-CM

## 2021-10-20 DIAGNOSIS — J06.9 VIRAL URI: ICD-10-CM

## 2021-10-20 PROCEDURE — 99421 OL DIG E/M SVC 5-10 MIN: CPT | Performed by: PHYSICIAN ASSISTANT

## 2021-10-20 PROCEDURE — U0003 INFECTIOUS AGENT DETECTION BY NUCLEIC ACID (DNA OR RNA); SEVERE ACUTE RESPIRATORY SYNDROME CORONAVIRUS 2 (SARS-COV-2) (CORONAVIRUS DISEASE [COVID-19]), AMPLIFIED PROBE TECHNIQUE, MAKING USE OF HIGH THROUGHPUT TECHNOLOGIES AS DESCRIBED BY CMS-2020-01-R: HCPCS

## 2021-10-20 PROCEDURE — U0005 INFEC AGEN DETEC AMPLI PROBE: HCPCS

## 2021-10-20 NOTE — PATIENT INSTRUCTIONS
COVID-19 testing at [facility] is by appointment only. You'll need to schedule a time to get tested. If you have symptoms (signs) of COVID, please log in to [employer] to complete an e-visit (virtual visit). This is the first step to getting tested.    If you don't have COVID symptoms and want to get tested, you should also log in to [employer] for an e-visit. This includes people who:    have had close contact with a COVID-positive person    want to be tested before or after travel    have taken part in high-risk activities    have a school testing mandate, or     were told to get tested by their care team or the health department.     A [employer] e-visit is the fastest way for you to be seen by our care team. Please choose  Next available provider  to complete an e-visit. When you choose this option, the average response time is less than one hour.  After the e-visit, you'll be able to self-schedule your test at one of our testing locations. To learn more about our testing locations or for other details, please visit our COVID-19 Resource Hub.    [employer] is also the fastest way to get your test results. You'll get your results in [employer] within 3 days. If you don't use [employer] you'll get your results in the mail in 7 to 10 days. If your test is positive and you don't view your result in [employer] within 1 business day, you'll get a phone call with your result. A positive result means that you have COVID-19.    If you have an upcoming procedure at [facility], you'll need to be tested for COVID. The test needs to happen 2 to 4 days before your procedure. If you have an upcoming procedure, we will contact you to schedule a COVID test.    If you don't have a [employer] account, please call [phone] to set up a virtual visit. You can also find community testing sites in [place] at [place]/get-tested/testing-locations. If you live in [place], please visit  www.[place]hs.[place].gov/covid-19/community-testing.htm.      I am sending you for COVID testing.      COVID-19 testing at [facility] is by appointment only. You'll need to schedule a time to get tested. If you have symptoms (signs) of COVID, please log in to [employer] to complete an e-visit (virtual visit). This is the first step to getting tested.    If you don't have COVID symptoms and want to get tested, you should also log in to [employer] for an e-visit. This includes people who:    have had close contact with a COVID-positive person    want to be tested before or after travel    have taken part in high-risk activities    have a school testing mandate, or     were told to get tested by their care team or the health department.     A [employer] e-visit is the fastest way for you to be seen by our care team. Please choose  Next available provider  to complete an e-visit. When you choose this option, the average response time is less than one hour.  After the e-visit, you'll be able to self-schedule your test at one of our testing locations. To learn more about our testing locations or for other details, please visit our COVID-19 Resource Hub.    [employer] is also the fastest way to get your test results. You'll get your results in [employer] within 3 days. If you don't use [employer], you'll get your results in the mail in 7 to 10 days. If your test is positive and you don't view your result in [employer] within 1 business day, you'll get a phone call with your result. A positive result means that you have COVID-19.    If you have an upcoming procedure at [facility], you'll need to be tested for COVID. The test needs to happen 2 to 4 days before your procedure. If you have an upcoming procedure, we will contact you to schedule a COVID test.    If you don't have a [employer] account, please call [phone] to set up a virtual visit. You can also find community testing sites in [place] at  [place].gov/covid19/get-tested/testing-locations. If you live in [place], please visit www.[place]hs[place].gov/covid-19/community-testing.htm.

## 2021-10-21 LAB — SARS-COV-2 RNA RESP QL NAA+PROBE: NEGATIVE

## 2021-10-22 ENCOUNTER — OFFICE VISIT (OUTPATIENT)
Dept: FAMILY MEDICINE | Facility: CLINIC | Age: 61
End: 2021-10-22
Payer: COMMERCIAL

## 2021-10-22 VITALS
DIASTOLIC BLOOD PRESSURE: 73 MMHG | BODY MASS INDEX: 54.29 KG/M2 | SYSTOLIC BLOOD PRESSURE: 133 MMHG | HEART RATE: 67 BPM | OXYGEN SATURATION: 95 % | TEMPERATURE: 97.6 F | WEIGHT: 293 LBS

## 2021-10-22 DIAGNOSIS — J01.90 ACUTE SINUSITIS WITH SYMPTOMS > 10 DAYS: Primary | ICD-10-CM

## 2021-10-22 PROBLEM — J44.9 COPD (CHRONIC OBSTRUCTIVE PULMONARY DISEASE) (H): Status: ACTIVE | Noted: 2021-10-22

## 2021-10-22 PROBLEM — J44.9 COPD (CHRONIC OBSTRUCTIVE PULMONARY DISEASE) (H): Status: RESOLVED | Noted: 2021-10-22 | Resolved: 2021-10-22

## 2021-10-22 PROCEDURE — 90471 IMMUNIZATION ADMIN: CPT | Performed by: PHYSICIAN ASSISTANT

## 2021-10-22 PROCEDURE — 90750 HZV VACC RECOMBINANT IM: CPT | Performed by: PHYSICIAN ASSISTANT

## 2021-10-22 PROCEDURE — 99213 OFFICE O/P EST LOW 20 MIN: CPT | Mod: 25 | Performed by: PHYSICIAN ASSISTANT

## 2021-10-22 ASSESSMENT — PAIN SCALES - GENERAL: PAINLEVEL: MODERATE PAIN (5)

## 2021-10-22 NOTE — PATIENT INSTRUCTIONS
Patient Education     Understanding Acute Rhinosinusitis  Acute rhinosinusitis is when the lining of the inside of the nose and the sinuses becomes irritated and swollen. It is also called sinusitis, or a sinus infection.  Sinuses are air-filled spaces in the skull behind the face. They are kept moist and clean by a lining of mucosa. Things such as pollen, smoke, and chemical fumes can irritate the mucosa. It can then swell up. As a response to irritation, the mucosa makes more mucus and other fluids. Tiny hairlike cilia cover the mucosa. Cilia help carry mucus toward the opening of the sinus. Too much mucus may cause the cilia to stop working. This blocks the sinus opening. A buildup of fluid in the sinuses then causes pain and pressure. It can also cause bacteria to grow in the sinuses.    What causes acute rhinosinusitis?  A sinus infection is most often caused by a virus. You are more likely to get one after having a cold or the flu. In some cases, a sinus infection can be caused by bacteria.  You are at higher risk for a sinus infection if you:    Are older in age    Have structural problems with your sinuses    Smoke or are exposed to secondhand smoke    Are exposed to changes in pressure, such as from flying a lot or deep sea diving    Have asthma or allergies    Have a weak immune system    Have dental disease     Symptoms of acute rhinosinusitis  Symptoms of acute rhinosinusitis often last around 7 to 10 days. If you have a bacterial infection, they may last longer. They may also get better but then worsen. You may have:    Face pain or pressure under the eyes and around the nose    Headache    Fluid draining in the back of the throat (postnasal drip)    Congestion    Drainage that is thick and colored (often green), instead of clear    Cough    Problems with your sense of smell    Ear pain or hearing problems    Fever    Tooth pain    Fatigue  Diagnosing acute rhinosinusitis  Your healthcare provider  will ask about your symptoms and past health. He or she will look at your ears, nose, throat, and sinuses. Imaging tests, such as X-rays, are often not needed.  It can be hard to figure out if a sinus infection is caused by a virus or bacterium. A bacterial infection tends to last longer. Symptoms may also get better but then worsen. Your healthcare provider may take a sample of mucus from your nose to check for bacteria.  Treating acute rhinosinusitis  Most sinus infections will go away within 10 days. Your body will fight off the virus. If your symptoms seem to get better but then worsen, you may have a bacterial infection instead. Your healthcare provider will then give you antibiotics. Take this medicine until it is gone, even if you feel better.  To help ease your symptoms, your healthcare provider may advise:    Over-the-counter pain relievers. Medicines such as acetaminophen or ibuprofen can ease sinus pain. They may also lower a fever.    Nasal washes. Washing your nasal passages with salt water may ease pain and pressure. It can rinse out mucous and other irritants from your sinuses. Your healthcare provider can show you how to do it.    Nasal steroid spray. This prescription medicine can reduce inflammation in your sinuses.    Other medicines. Decongestants, antihistamines, and other nasal sprays may give short-term relief. They may help with congestion. Talk with your healthcare provider before taking these medicines.     Preventing acute rhinosinusitis  You can help prevent a sinus infection with these steps:    Wash your hands well and often.    Stay away from people who have a cold or upper respiratory infection.    Don't smoke. And stay away from secondhand smoke.    Use a humidifier at home.    Make sure you are up-to-date on your vaccines, such as the flu shot.     When to call your healthcare provider  Call your healthcare provider right away if you have any of these:    Fever of 100.4 F (38 C) or  higher, or as directed by your healthcare provider    Pain that gets worse    Symptoms that don t get better, or get worse    New symptoms  [place] last reviewed this educational content on 6/1/2019 2000-2021 [employer]. All rights reserved. This information is not intended as a substitute for professional medical care. Always follow your healthcare professional's instructions.

## 2021-10-22 NOTE — PROGRESS NOTES
Assessment & Plan     Acute sinusitis with symptoms > 10 days  Ongoing sinus issues which seem to have worsened in the past few days. Suggest a course of antibiotics. Encouraged sinus rinses as well as increased fluids. No further questions today.   - amoxicillin-clavulanate (AUGMENTIN) 875-125 MG tablet; Take 1 tablet by mouth 2 times daily for 10 days      Return in about 2 weeks (around 11/5/2021) for worsening symptoms or failure to improve..    AVTAR Jarquin [facility] [place]    Subjective   Ludmila Sanchez is a 61 year old who presents for the following health issues     HPI     Concern - Sinus  Onset: Couple of weeks  Description: Eye irritation, runny nose, draining  Intensity: severe   Progression of Symptoms:  worsening  Accompanying Signs & Symptoms: Pain in right ear, green and yellow mucous when blowing nose  Previous history of similar problem: Yes  Precipitating factors:        Worsened by: None  Alleviating factors:        Improved by: Rootology  herb  Therapies tried and outcome:  none     Did have covid positive test on 9/30. Was to go back to work 10/11. Still hasn't been feeling great. Mostly sinus pressure.       Review of Systems   Constitutional, HEENT, cardiovascular, pulmonary, gi and gu systems are negative, except as otherwise noted.      Objective    /73 (BP Location: Right arm, Patient Position: Chair, Cuff Size: Adult Large)   Pulse 67   Temp 97.6  F (36.4  C) (Oral)   Wt 143.8 kg (317 lb 0.5 oz)   LMP 11/01/2011   SpO2 95%   BMI 54.29 kg/m    Body mass index is 54.29 kg/m .  Physical Exam   GENERAL: healthy, alert and no distress  HENT: ear canals and TM's normal, nose and mouth without ulcers or lesions. Maxillary sinus tenderness to palpation.  NECK: no adenopathy, no asymmetry, masses, or scars and thyroid normal to palpation  RESP: lungs clear to auscultation - no rales, rhonchi or wheezes  CV: regular rate and rhythm, normal S1 S2, no S3 or  S4, no murmur, click or rub, no peripheral edema and peripheral pulses strong  ABDOMEN: soft, nontender, no hepatosplenomegaly, no masses and bowel sounds normal  MS: no gross musculoskeletal defects noted, no edema

## 2021-10-22 NOTE — NURSING NOTE
Prior to immunization administration, verified patients identity using patient s name and date of birth. Please see Immunization Activity for additional information.     Screening Questionnaire for Adult Immunization    Are you sick today?   No   Do you have allergies to medications, food, a vaccine component or latex?   No   Have you ever had a serious reaction after receiving a vaccination?   No   Do you have a long-term health problem with heart, lung, kidney, or metabolic disease (e.g., diabetes), asthma, a blood disorder, no spleen, complement component deficiency, a cochlear implant, or a spinal fluid leak?  Are you on long-term aspirin therapy?   No   Do you have cancer, leukemia, HIV/AIDS, or any other immune system problem?   No   Do you have a parent, brother, or sister with an immune system problem?   No   In the past 3 months, have you taken medications that affect  your immune system, such as prednisone, other steroids, or anticancer drugs; drugs for the treatment of rheumatoid arthritis, Crohn s disease, or psoriasis; or have you had radiation treatments?   No   Have you had a seizure, or a brain or other nervous system problem?   No   During the past year, have you received a transfusion of blood or blood    products, or been given immune (gamma) globulin or antiviral drug?   No   For women: Are you pregnant or is there a chance you could become       pregnant during the next month?   No   Have you received any vaccinations in the past 4 weeks?   No     Immunization questionnaire answers were all negative.      Vaccine information supplied.    Per orders of Kristie Gupta, injection of Shingrix given by Sabrina Franklin. Patient instructed to remain in clinic for 15 minutes afterwards, and to report any adverse reaction to me immediately.       Screening performed by Sabrina Franklin on 10/22/2021 at 12:10 PM.

## 2022-01-13 ENCOUNTER — OFFICE VISIT (OUTPATIENT)
Dept: FAMILY MEDICINE | Facility: CLINIC | Age: 62
End: 2022-01-13
Payer: COMMERCIAL

## 2022-01-13 VITALS
OXYGEN SATURATION: 95 % | HEART RATE: 107 BPM | SYSTOLIC BLOOD PRESSURE: 149 MMHG | TEMPERATURE: 97.3 F | BODY MASS INDEX: 57.2 KG/M2 | WEIGHT: 293 LBS | DIASTOLIC BLOOD PRESSURE: 80 MMHG

## 2022-01-13 DIAGNOSIS — F31.31 BIPOLAR AFFECTIVE DISORDER, CURRENTLY DEPRESSED, MILD (H): ICD-10-CM

## 2022-01-13 DIAGNOSIS — E66.01 MORBID OBESITY DUE TO EXCESS CALORIES (H): ICD-10-CM

## 2022-01-13 DIAGNOSIS — L24.A0 IRRITANT CONTACT DERMATITIS DUE TO FRICTION: Primary | ICD-10-CM

## 2022-01-13 PROCEDURE — 96127 BRIEF EMOTIONAL/BEHAV ASSMT: CPT | Mod: 59 | Performed by: PHYSICIAN ASSISTANT

## 2022-01-13 PROCEDURE — 99213 OFFICE O/P EST LOW 20 MIN: CPT | Mod: 25 | Performed by: PHYSICIAN ASSISTANT

## 2022-01-13 PROCEDURE — 90715 TDAP VACCINE 7 YRS/> IM: CPT | Performed by: PHYSICIAN ASSISTANT

## 2022-01-13 PROCEDURE — 90471 IMMUNIZATION ADMIN: CPT | Performed by: PHYSICIAN ASSISTANT

## 2022-01-13 RX ORDER — PSEUDOEPHED/ACETAMINOPH/DIPHEN 30MG-500MG
TABLET ORAL
COMMUNITY
Start: 2022-01-06 | End: 2022-09-14

## 2022-01-13 RX ORDER — TRIAMCINOLONE ACETONIDE 1 MG/G
CREAM TOPICAL 2 TIMES DAILY
Qty: 80 G | Refills: 1 | Status: SHIPPED | OUTPATIENT
Start: 2022-01-13 | End: 2022-09-14

## 2022-01-13 RX ORDER — SERTRALINE HYDROCHLORIDE 100 MG/1
100 TABLET, FILM COATED ORAL DAILY
Qty: 90 TABLET | Refills: 2 | Status: SHIPPED | OUTPATIENT
Start: 2022-01-13 | End: 2022-10-18

## 2022-01-13 RX ORDER — IBUPROFEN 600 MG/1
600 TABLET, FILM COATED ORAL
COMMUNITY
Start: 2022-01-05 | End: 2022-09-14

## 2022-01-13 ASSESSMENT — ANXIETY QUESTIONNAIRES
6. BECOMING EASILY ANNOYED OR IRRITABLE: NOT AT ALL
7. FEELING AFRAID AS IF SOMETHING AWFUL MIGHT HAPPEN: NOT AT ALL
GAD7 TOTAL SCORE: 0
5. BEING SO RESTLESS THAT IT IS HARD TO SIT STILL: NOT AT ALL
IF YOU CHECKED OFF ANY PROBLEMS ON THIS QUESTIONNAIRE, HOW DIFFICULT HAVE THESE PROBLEMS MADE IT FOR YOU TO DO YOUR WORK, TAKE CARE OF THINGS AT HOME, OR GET ALONG WITH OTHER PEOPLE: NOT DIFFICULT AT ALL
3. WORRYING TOO MUCH ABOUT DIFFERENT THINGS: NOT AT ALL
1. FEELING NERVOUS, ANXIOUS, OR ON EDGE: NOT AT ALL
2. NOT BEING ABLE TO STOP OR CONTROL WORRYING: NOT AT ALL

## 2022-01-13 ASSESSMENT — PATIENT HEALTH QUESTIONNAIRE - PHQ9
5. POOR APPETITE OR OVEREATING: NOT AT ALL
SUM OF ALL RESPONSES TO PHQ QUESTIONS 1-9: 0

## 2022-01-13 ASSESSMENT — PAIN SCALES - GENERAL: PAINLEVEL: MILD PAIN (3)

## 2022-01-13 NOTE — NURSING NOTE
Prior to immunization administration, verified patients identity using patient s name and date of birth. Please see Immunization Activity for additional information.     Screening Questionnaire for Adult Immunization    Are you sick today?   No   Do you have allergies to medications, food, a vaccine component or latex?   No   Have you ever had a serious reaction after receiving a vaccination?   No   Do you have a long-term health problem with heart, lung, kidney, or metabolic disease (e.g., diabetes), asthma, a blood disorder, no spleen, complement component deficiency, a cochlear implant, or a spinal fluid leak?  Are you on long-term aspirin therapy?   No   Do you have cancer, leukemia, HIV/AIDS, or any other immune system problem?   No   Do you have a parent, brother, or sister with an immune system problem?   No   In the past 3 months, have you taken medications that affect  your immune system, such as prednisone, other steroids, or anticancer drugs; drugs for the treatment of rheumatoid arthritis, Crohn s disease, or psoriasis; or have you had radiation treatments?   Yes- steroid   Have you had a seizure, or a brain or other nervous system problem?   No   During the past year, have you received a transfusion of blood or blood    products, or been given immune (gamma) globulin or antiviral drug?   No   For women: Are you pregnant or is there a chance you could become       pregnant during the next month?   No   Have you received any vaccinations in the past 4 weeks?   No     Immunization questionnaire was positive for at least one answer.  Notified provider.     Vaccine information supplied.    Per orders of Kristie Gupta, injection of Tdap given by Sabrina Franklin. Patient instructed to remain in clinic for 15 minutes afterwards, and to report any adverse reaction to me immediately.       Screening performed by Sabrina Franklin on 1/13/2022 at 3:24 PM.

## 2022-01-13 NOTE — PROGRESS NOTES
Assessment & Plan     Irritant contact dermatitis due to friction  Irritated from top of pants. Suggest keeping area dry, avoiding tight fitting clothing. Kenalog cream for 2 weeks to help continued improvement in redness/irritation. She has no further questions. I discussed with the patient risks and benefits of the new medication prescribed including potential side effects.  The patient had opportunity to ask questions and is comfortable with and interested in medications as prescribed.   - triamcinolone (KENALOG) 0.1 % external cream; Apply topically 2 times daily    Bipolar affective disorder, currently depressed, mild (H)  Stable on current dose of medication.  - sertraline (ZOLOFT) 100 MG tablet; Take 1 tablet (100 mg) by mouth daily    Morbid obesity due to excess calories (H)  Discussed                      Return in about 9 months (around 10/13/2022) for Routine preventive.    AVTAR Jarquin [facility] [place]    Subjective   Ludmila Sanchez is a 61 year old who presents for the following health issues     HPI     Rash  Onset/Duration: Ongoing for a couple of months  Description  Location: Lower abdomen  Character: flakey, red  Itching: no  Intensity:  mild  Progression of Symptoms:  improving  Accompanying signs and symptoms:   Fever: no  Body aches or joint pain: no  Sore throat symptoms: no  Recent cold symptoms: no  History:           Previous episodes of similar rash: None  New exposures:  None  Recent travel: no  Exposure to similar rash: no  Precipitating or alleviating factors: Was on Prednisone and this helped, wondering if this was a virus  Therapies tried and outcome: Cream    Did get better over the last few weeks. No new clothes, no new laundry detergent.     Mood  Has been very stable over the last few years. Does not wish to change any medications. Trying to get healthier - increase water, start exercise.    PHQ 6/10/2016 3/28/2021 1/13/2022   PHQ-9 Total Score 5 0 0    Q9: Thoughts of better off dead/self-harm past 2 weeks Not at all Not at all Not at all     WHIT-7 SCORE 1/8/2013 1/13/2022   Total Score 3 -   Total Score - 0           Review of Systems   Constitutional, HEENT, cardiovascular, pulmonary, gi and gu systems are negative, except as otherwise noted.      Objective    BP (!) 149/80 (BP Location: Left arm, Patient Position: Chair, Cuff Size: Adult Large)   Pulse 107   Temp 97.3  F (36.3  C) (Oral)   Wt (!) 151.5 kg (334 lb 0.6 oz)   LMP 11/01/2011   SpO2 95%   BMI 57.20 kg/m    Body mass index is 57.2 kg/m .  Physical Exam   GENERAL: healthy, alert and no distress  RESP: lungs clear to auscultation - no rales, rhonchi or wheezes  CV: regular rate and rhythm, normal S1 S2, no S3 or S4, no murmur, click or rub, no peripheral edema and peripheral pulses strong  ABDOMEN: soft, nontender, no hepatosplenomegaly, no masses and bowel sounds normal  MS: no gross musculoskeletal defects noted, no edema  SKIN: mildly erythematous flaky line across abdomen where top of pants would be

## 2022-01-14 ASSESSMENT — ANXIETY QUESTIONNAIRES: GAD7 TOTAL SCORE: 0

## 2022-08-09 ENCOUNTER — THERAPY VISIT (OUTPATIENT)
Dept: PHYSICAL THERAPY | Facility: CLINIC | Age: 62
End: 2022-08-09

## 2022-08-09 ENCOUNTER — TELEPHONE (OUTPATIENT)
Dept: GASTROENTEROLOGY | Facility: CLINIC | Age: 62
End: 2022-08-09

## 2022-08-09 ENCOUNTER — OFFICE VISIT (OUTPATIENT)
Dept: FAMILY MEDICINE | Facility: CLINIC | Age: 62
End: 2022-08-09
Payer: COMMERCIAL

## 2022-08-09 VITALS
DIASTOLIC BLOOD PRESSURE: 82 MMHG | BODY MASS INDEX: 57.03 KG/M2 | SYSTOLIC BLOOD PRESSURE: 136 MMHG | TEMPERATURE: 98.3 F | WEIGHT: 293 LBS | OXYGEN SATURATION: 93 % | HEART RATE: 103 BPM | RESPIRATION RATE: 15 BRPM

## 2022-08-09 DIAGNOSIS — Z12.11 SCREEN FOR COLON CANCER: ICD-10-CM

## 2022-08-09 DIAGNOSIS — Z23 HIGH PRIORITY FOR 2019-NCOV VACCINE: ICD-10-CM

## 2022-08-09 DIAGNOSIS — M54.6 PAIN IN THORACIC SPINE: ICD-10-CM

## 2022-08-09 DIAGNOSIS — E66.01 MORBID OBESITY (H): ICD-10-CM

## 2022-08-09 DIAGNOSIS — M54.6 ACUTE RIGHT-SIDED THORACIC BACK PAIN: Primary | ICD-10-CM

## 2022-08-09 DIAGNOSIS — D12.6 ADENOMATOUS POLYP OF COLON, UNSPECIFIED PART OF COLON: ICD-10-CM

## 2022-08-09 LAB
ALBUMIN UR-MCNC: NEGATIVE MG/DL
APPEARANCE UR: CLEAR
BILIRUB UR QL STRIP: NEGATIVE
COLOR UR AUTO: YELLOW
GLUCOSE UR STRIP-MCNC: NEGATIVE MG/DL
HGB UR QL STRIP: NEGATIVE
KETONES UR STRIP-MCNC: NEGATIVE MG/DL
LEUKOCYTE ESTERASE UR QL STRIP: NEGATIVE
NITRATE UR QL: NEGATIVE
PH UR STRIP: 6 [PH] (ref 5–7)
SP GR UR STRIP: 1.02 (ref 1–1.03)
UROBILINOGEN UR STRIP-ACNC: 0.2 E.U./DL

## 2022-08-09 PROCEDURE — 96127 BRIEF EMOTIONAL/BEHAV ASSMT: CPT | Performed by: FAMILY MEDICINE

## 2022-08-09 PROCEDURE — 81003 URINALYSIS AUTO W/O SCOPE: CPT | Performed by: FAMILY MEDICINE

## 2022-08-09 PROCEDURE — 87086 URINE CULTURE/COLONY COUNT: CPT | Performed by: FAMILY MEDICINE

## 2022-08-09 PROCEDURE — 91305 COVID-19,PF,PFIZER (12+ YRS): CPT | Performed by: FAMILY MEDICINE

## 2022-08-09 PROCEDURE — 97140 MANUAL THERAPY 1/> REGIONS: CPT | Mod: GP | Performed by: PHYSICAL THERAPIST

## 2022-08-09 PROCEDURE — 0054A COVID-19,PF,PFIZER (12+ YRS): CPT | Performed by: FAMILY MEDICINE

## 2022-08-09 PROCEDURE — 97161 PT EVAL LOW COMPLEX 20 MIN: CPT | Mod: GP | Performed by: PHYSICAL THERAPIST

## 2022-08-09 PROCEDURE — 99214 OFFICE O/P EST MOD 30 MIN: CPT | Mod: 25 | Performed by: FAMILY MEDICINE

## 2022-08-09 RX ORDER — NAPROXEN 500 MG/1
500 TABLET ORAL 2 TIMES DAILY WITH MEALS
Qty: 30 TABLET | Refills: 0 | Status: SHIPPED | OUTPATIENT
Start: 2022-08-09 | End: 2022-11-01

## 2022-08-09 RX ORDER — CYCLOBENZAPRINE HCL 10 MG
10 TABLET ORAL 3 TIMES DAILY PRN
Qty: 25 TABLET | Refills: 0 | Status: SHIPPED | OUTPATIENT
Start: 2022-08-09 | End: 2022-11-01

## 2022-08-09 ASSESSMENT — PATIENT HEALTH QUESTIONNAIRE - PHQ9
SUM OF ALL RESPONSES TO PHQ QUESTIONS 1-9: 0
SUM OF ALL RESPONSES TO PHQ QUESTIONS 1-9: 0
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
## 2022-08-09 NOTE — TELEPHONE ENCOUNTER
Screening Questions    BlueKIND OF PREP RedLOCATION [review exclusion criteria] GreenSEDATION TYPE      1. Are you active on mychart? y    2. What insurance is in the chart? UMR     3.   Ordering/Referring Provider: Nadir    4. BMI   (If greater than 40 review exclusion criteria [PAC APPT IF [MAC] @ [facility])  57.0  [If yes, BMI OVER 40-EXTENDED PREP]      **(Sedation review/consideration needed)**  Do you have a legal guardian or Medical Power of    and/or are you able to give consent for your medical care?     y    5. Have you had a positive covid test in the last 90 days?   n -     6.  Are you currently on dialysis?   n [ If yes, G-PREP & HOSPITAL setting ONLY]     7.  Do you have chronic kidney disease?  n [ If yes, G-PREP ]    8.   Do you have a diagnosis of diabetes?   n   [ If yes, G-PREP ]    9.  On a regular basis do you go 3-5 days between bowel movements?   n   [ If yes, EXTENDED PREP]    10.  Are you taking any prescription pain medications on a routine schedule?    y [ If yes, EXTENDED PREP] [If yes, MAC]      11.   Do you have any chemical dependencies such as alcohol, street drugs, or methadone?    n [If yes, MAC]    12.   Do you have any history of post-traumatic stress syndrome, severe anxiety or history of psychosis?    n  [If yes, MAC]    13.  [FEMALES] Are you currently pregnant?     If yes, how many weeks?       Respiratory/Heart Screening:  [If yes to any of the following HOSPITAL setting only]     14. Do you have Pulmonary Hypertension [Lungs]?   n       15. Do you have UNCONTROLLED asthma?   n     16.  Do you use daily home oxygen?  n      17. Do you have mod to severe Obstructive Sleep Apnea?         (OKAY @ [facility]  [facility]  SH  PH  RI  MG - if pt is not on OXYGEN)  y      18.   Have you had a heart or lung transplant?   n      19.   Have you had a stroke or Transient ischemic attack (TIA - aka  mini stroke ) within 6 months?  (If yes, please review exclusion criteria)  n     20.   In  the past 6 months, have you had any heart related issues including cardiomyopathy or heart attack?   n           If yes, did it require cardiac stenting or other implantable device?   n      21.   Do you have any implantable devices in your body (pacemaker, defib, LVAD)? (If yes, please review exclusion criteria)  n   22.  Do you take the medication Phentermine?  NO        23. Do you take nitroglycerin?   n           If yes, how often? n  (if yes, HOSPITAL setting ONLY)    24.  Are you currently taking any blood thinners?    [If yes, INFORM patient to follw  w/ ORDERING PROVIDER FOR BRIDGING INSTRUCTIONS]     n    25.   Do you transfer independently?                (If NO, please HOSPITAL setting ONLY)  y    26.   Preferred LOCAL Pharmacy for Pre Prescription:      [id number] - [address] [facility]    Scheduling Details  (Please ask for phone number if not scheduled by patient)      Caller : Ludmila Sanchez    Date of Procedure: 9/15  Surgeon: ЕКАТЕРИНА  Location: [facility]        Sedation Type:  l MAC  Conscious Sedation- Needs  for 6 hours after the procedure  MAC/General-Needs  for 24 hours after procedure     :[Pre-op Required] at [facility]  SH  MG and OR for MAC sedation Y 9/14 EXAM    (advise patient they will need a pre-op WITH IN 30 DAYS of procedure date)     Type of Procedure Scheduled:   Lower Endoscopy [Colonoscopy]    Which Colonoscopy Prep was Sent?:    - EXTENDED    KHORUTS CF PATIENTS & GROEN'S PATIENTS NEEDS EXTENDED PREP       Informed patient they will need an adult  Y  Cannot take any type of public or medical transportation alone    Pre-Procedure Covid test to be completed at [facility] or Externally:   **INFORMED OF HOME TESTING & LAB OPTION**HOME        Confirmed Nurse will call to complete assessment Y    Additional comments:

## 2022-08-09 NOTE — PROGRESS NOTES
uc    Assessment & Plan     Acute right-sided thoracic back pain  Urine is normal   Will check UC  Possibly Musculoskeletal  SEE EPIC care orders  The potential side effects of this medication have been discussed with the patient.  Call if any significant problems with these are experienced.  Make appointment PT  If any further Blood in Urine -Please be seen same day  - naproxen (NAPROSYN) 500 MG tablet; Take 1 tablet (500 mg) by mouth 2 times daily (with meals)  - cyclobenzaprine (FLEXERIL) 10 MG tablet; Take 1 tablet (10 mg) by mouth 3 times daily as needed for muscle spasms  - Physical Therapy Referral; Future  - Urine Culture Aerobic Bacterial - lab collect; Future    BMI>50  Possibly attributing to pain    Screen for colon cancer  Advised   Has History of colon polyps  - Adult GI  Referral - Procedure Only; Future    High priority for 2019-nCoV vaccine  Advised   - COVID-19,PF,PFIZER (12+ Yrs GRAY LABEL)  BMI: Return in about 3 weeks (around 8/30/2022) for Physical Exam, recheck/ sooner if worse or New symptoms.    Tonia Schmitz MD  [facility]   Ludmila Geno Sanchez is a 62 year old{    History of Present Illness       Reason for visit:  Possible kidney infection  Symptom onset:  1-2 weeks ago  Symptoms include:  Right upper back pain  Symptom intensity:  Severe  Symptom progression:  Worsening  Had these symptoms before:  No  What makes it worse:  No  What makes it better:  No    She eats 0-1 servings of fruits and vegetables daily.She exercises with enough effort to increase her heart rate 9 or less minutes per day.  She exercises with enough effort to increase her heart rate 3 or less days per week.   She is taking medications regularly.    Today's PHQ-9         PHQ-9 Total Score: 0    PHQ-9 Q9 Thoughts of better off dead/self-harm past 2 weeks :   Not at all       pt has pain Right Thoracic area x 2 weeeks  Hurts to move  No trauma    Genitourinary -  Female  Onset/Duration: 2 week  Description:   Painful urination (Dysuria):no           Frequency: No  Blood in urine (Hematuria): YES? X 1 2 weeks ago  Now Urine is normal     Intensity: moderate  Progression of Symptoms: same  Accompanying Signs & Symptoms:  Fever/chills: No  Flank pain: No  Nausea and vomiting: No  Vaginal symptoms: none  Abdominal/Pelvic Pain: No  History:   History of frequent UTI s: No  History of kidney stones: No  Sexually Active: No  Possibility of pregnancy: No  Precipitating or alleviating factors: None  Therapies tried and outcome: Increase fluid intake and OTC advil or tylenol   Review of Systems   Rest of the ROS is Negative except see above and Problem list [stable]        Objective    /82   Pulse 103   Temp 98.3  F (36.8  C)   Resp 15   Wt (!) 151 kg (333 lb)   LMP 11/01/2011   SpO2 93%   BMI 57.03 kg/m    Body mass index is 57.03 kg/m .  Physical Exam   GENERAL: healthy, alert and no distress  RESP: lungs clear to auscultation - no rales, rhonchi or wheezes  ABDOMEN: soft, nontender, no hepatosplenomegaly, no masses and bowel sounds normal  MS: no gross musculoskeletal defects noted, no edema  SKIN: no suspicious lesions or rashes  NEURO: Normal strength and tone, mentation intact and speech normal  Pain Right Thoracic area-has pain with movement  No spine tenderness     Results for orders placed or performed in visit on 08/09/22   UA reflex to Microscopic and Culture     Status: Normal    Specimen: Urine, Clean Catch   Result Value Ref Range    Color Urine Yellow Colorless, Straw, Light Yellow, Yellow    Appearance Urine Clear Clear    Glucose Urine Negative Negative mg/dL    Bilirubin Urine Negative Negative    Ketones Urine Negative Negative mg/dL    Specific Gravity Urine 1.025 1.003 - 1.035    Blood Urine Negative Negative    pH Urine 6.0 5.0 - 7.0    Protein Albumin Urine Negative Negative mg/dL    Urobilinogen Urine 0.2 0.2, 1.0 E.U./dL    Nitrite Urine Negative  Negative    Leukocyte Esterase Urine Negative Negative    Narrative    Microscopic not indicated             .  ..

## 2022-08-10 LAB — BACTERIA UR CULT: NORMAL

## 2022-08-15 ENCOUNTER — MYC MEDICAL ADVICE (OUTPATIENT)
Dept: FAMILY MEDICINE | Facility: CLINIC | Age: 62
End: 2022-08-15

## 2022-08-15 ENCOUNTER — ALLIED HEALTH/NURSE VISIT (OUTPATIENT)
Dept: FAMILY MEDICINE | Facility: CLINIC | Age: 62
End: 2022-08-15

## 2022-08-15 ENCOUNTER — NURSE TRIAGE (OUTPATIENT)
Dept: FAMILY MEDICINE | Facility: CLINIC | Age: 62
End: 2022-08-15

## 2022-08-15 ENCOUNTER — THERAPY VISIT (OUTPATIENT)
Dept: PHYSICAL THERAPY | Facility: CLINIC | Age: 62
End: 2022-08-15
Attending: FAMILY MEDICINE
Payer: COMMERCIAL

## 2022-08-15 VITALS — SYSTOLIC BLOOD PRESSURE: 143 MMHG | OXYGEN SATURATION: 98 % | HEART RATE: 78 BPM | DIASTOLIC BLOOD PRESSURE: 63 MMHG

## 2022-08-15 DIAGNOSIS — N93.9 VAGINAL BLEEDING: Primary | ICD-10-CM

## 2022-08-15 DIAGNOSIS — M54.6 ACUTE RIGHT-SIDED THORACIC BACK PAIN: ICD-10-CM

## 2022-08-15 DIAGNOSIS — M54.6 PAIN IN THORACIC SPINE: Primary | ICD-10-CM

## 2022-08-15 PROCEDURE — 99207 PR NO CHARGE NURSE ONLY: CPT

## 2022-08-15 NOTE — TELEPHONE ENCOUNTER
Patient was already triaged over the phone regarding this. See alternate TE 08/15/22    DANNI Silva RN  [facility]

## 2022-08-15 NOTE — PROGRESS NOTES
See triage encounter dated 8/15/22    Patient was in clinic for Physical therapy.  She informed the therapist the she noted a large bright red spot on the bed sheet this morning. While on the toilet, patient noted a large dark red clot (like a piece of liver)  in the toilet bowel. Patient reports saturating 2 pad in 4 hours. Patient is also experiencing intermittent dull lower back pain that, rates as mild.  Patient denies any other symptoms or concerns at this time.     Huddled with PCP (Dr. Schmitz) who recommended patient schedule an appointment with a provider 8/16/22 for further evaluation and treatment.  Assisted patient with scheduling an office visit at the [facility] later 8/16/22.     Patient was advised to call the clinic [phone] or seek medical assistance at the nearest ER or UC, if the symptoms persist or worsen before the scheduled appointment.     Patient verbalized understanding and has no further questions or concerns at this time.

## 2022-08-15 NOTE — TELEPHONE ENCOUNTER
"  Reason for Disposition    Patient wants to be seen    Additional Information    Negative: Pale skin (pallor) of new-onset or worsening    Negative: Constant abdominal pain lasting > 2 hours    Negative: Patient sounds very sick or weak to the triager    Negative: Taking Coumadin (warfarin) or other strong blood thinner, or known bleeding disorder (e.g., thrombocytopenia)    Negative: Bleeding/spotting after procedure (e.g., biopsy) or pelvic examination (e.g., pap smear) that persists > 3 days    Answer Assessment - Initial Assessment Questions  1. AMOUNT: \"Describe the bleeding that you are having.\"     - SPOTTING: spotting, or pinkish / brownish mucous discharge; does not fill panty liner or pad     - MILD:  less than 1 pad / hour; less than patient's usual menstrual bleeding    - MODERATE: 1-2 pads / hour; 1 menstrual cup every 6 hours; small-medium blood clots (e.g., pea, grape, small coin)    - SEVERE: soaking 2 or more pads/hour for 2 or more hours; 1 menstrual cup every 2 hours; bleeding not contained by pads or continuous red blood from vagina; large blood clots (e.g., golf ball, large coin)       moderate  2. ONSET: \"When did the bleeding begin?\" \"Is it continuing now?\"     8/15/22  3. MENSTRUAL PERIOD: \"When was the last normal menstrual period?\" \"How is this different than your period?\"      Multiple years  4. REGULARITY: \"How regular are your periods?\"      N/A  5. ABDOMINAL PAIN: \"Do you have any pain?\" \"How bad is the pain?\"  (e.g., Scale 1-10; mild, moderate, or severe)    - MILD (1-3): doesn't interfere with normal activities, abdomen soft and not tender to touch     - MODERATE (4-7): interferes with normal activities or awakens from sleep, abdomen tender to touch     - SEVERE (8-10): excruciating pain, doubled over, unable to do any normal activities       No  8. HORMONES: \"Are you taking any hormone medications, prescription or OTC?\" (e.g., birth control pills, estrogen)      No  9. BLOOD " "THINNERS: \"Do you take any blood thinners?\" (e.g., Coumadin/warfarin, Pradaxa/dabigatran, aspirin)      No  10. CAUSE: \"What do you think is causing the bleeding?\" (e.g., recent gyn surgery, recent gyn procedure; known bleeding disorder, cervical cancer, polycystic ovarian disease, fibroids)          unknown  11. HEMODYNAMIC STATUS: \"Are you weak or feeling lightheaded?\" If Yes, ask: \"Can you stand and walk normally?\"         No  12. OTHER SYMPTOMS: \"What other symptoms are you having with the bleeding?\" (e.g., passed tissue, vaginal discharge, fever, menstrual-type cramps)        Mild lower back pain    Protocols used: VAGINAL BLEEDING - CSHCTMIC-N-UP    "

## 2022-08-15 NOTE — TELEPHONE ENCOUNTER
Patient was in clinic for Physical therapy.  She informed the therapist the she noted a large bright red spot on the bed sheet this morning. While on the toilet, patient noted a large dark red clot (like a piece of liver)  in the toilet bowel. Patient reports saturating 2 pad in 4 hours. Patient is also experiencing intermittent dull lower back pain that, rates as mild.  Patient denies any other symptoms or concerns at this time.    Huddled with PCP (Dr. Schmitz) who recommended patient schedule an appointment with a provider 8/16/22 for further evaluation and treatment.  Assisted patient with scheduling an office visit at the [facility] later 8/16/22.    Patient was advised to call the clinic [phone] or seek medical assistance at the nearest ER or UC, if the symptoms persist or worsen before the scheduled appointment.    Patient verbalized understanding and has no further questions or concerns at this time.      Reason for Disposition    Vaginal bleeding is main symptom    MODERATE vaginal bleeding (i.e., soaking pad or tampon per hour and present > 6 hours; 1 menstrual cup every 6 hours)    Additional Information    Negative: Sounds like a life-threatening emergency to the triager    Negative: Followed a genital area injury (e.g., vagina, vulva)    Negative: SEVERE vaginal bleeding (e.g., continuous red blood from vagina, or large blood clots) and very weak (can't stand)    Negative: Passed out (i.e., fainted, collapsed and was not responding)    Negative: Difficult to awaken or acting confused (e.g., disoriented, slurred speech)    Negative: Shock suspected (e.g., cold/pale/clammy skin, too weak to stand, low BP, rapid pulse)    Negative: Sounds like a life-threatening emergency to the triager    Negative: Pregnant 20 or more weeks (5 months or more)    Negative: Pregnant < 20 weeks (less than 5 months)    Negative: Postpartum (from 0 to 6 weeks after delivery)    Negative: Vaginal discharge is main symptom  and bleeding is slight    Negative: SEVERE abdominal pain (e.g., excruciating)    Negative: SEVERE dizziness (e.g., unable to stand, requires support to walk, feels like passing out now)    Negative: SEVERE vaginal bleeding (e.g., soaking 2 pads or tampons per hour and present 2 or more hours; 1 menstrual cup every 2 hours)    Protocols used: VAGINAL SYMPTOMS-A-OH, VAGINAL BLEEDING - [id number]-UJ

## 2022-08-16 ENCOUNTER — OFFICE VISIT (OUTPATIENT)
Dept: FAMILY MEDICINE | Facility: CLINIC | Age: 62
End: 2022-08-16
Payer: COMMERCIAL

## 2022-08-16 VITALS
WEIGHT: 293 LBS | SYSTOLIC BLOOD PRESSURE: 150 MMHG | DIASTOLIC BLOOD PRESSURE: 77 MMHG | RESPIRATION RATE: 14 BRPM | HEIGHT: 65 IN | BODY MASS INDEX: 48.82 KG/M2 | OXYGEN SATURATION: 96 % | TEMPERATURE: 98 F | HEART RATE: 87 BPM

## 2022-08-16 DIAGNOSIS — N95.0 POST-MENOPAUSAL BLEEDING: Primary | ICD-10-CM

## 2022-08-16 DIAGNOSIS — R10.9 ABDOMINAL CRAMPING: ICD-10-CM

## 2022-08-16 DIAGNOSIS — M54.50 ACUTE LOW BACK PAIN WITHOUT SCIATICA, UNSPECIFIED BACK PAIN LATERALITY: ICD-10-CM

## 2022-08-16 LAB
ANION GAP SERPL CALCULATED.3IONS-SCNC: 5 MMOL/L (ref 3–14)
BUN SERPL-MCNC: 10 MG/DL (ref 7–30)
CALCIUM SERPL-MCNC: 9.2 MG/DL (ref 8.5–10.1)
CHLORIDE BLD-SCNC: 108 MMOL/L (ref 94–109)
CO2 SERPL-SCNC: 29 MMOL/L (ref 20–32)
CREAT SERPL-MCNC: 1 MG/DL (ref 0.52–1.04)
ERYTHROCYTE [DISTWIDTH] IN BLOOD BY AUTOMATED COUNT: 14 % (ref 10–15)
GFR SERPL CREATININE-BSD FRML MDRD: 63 ML/MIN/1.73M2
GLUCOSE BLD-MCNC: 99 MG/DL (ref 70–99)
HCT VFR BLD AUTO: 42.7 % (ref 35–47)
HGB BLD-MCNC: 13.2 G/DL (ref 11.7–15.7)
MCH RBC QN AUTO: 27.7 PG (ref 26.5–33)
MCHC RBC AUTO-ENTMCNC: 30.9 G/DL (ref 31.5–36.5)
MCV RBC AUTO: 90 FL (ref 78–100)
PLATELET # BLD AUTO: 249 10E3/UL (ref 150–450)
POTASSIUM BLD-SCNC: 4.5 MMOL/L (ref 3.4–5.3)
RBC # BLD AUTO: 4.76 10E6/UL (ref 3.8–5.2)
SODIUM SERPL-SCNC: 142 MMOL/L (ref 133–144)
WBC # BLD AUTO: 7.3 10E3/UL (ref 4–11)

## 2022-08-16 PROCEDURE — 85027 COMPLETE CBC AUTOMATED: CPT | Performed by: PHYSICIAN ASSISTANT

## 2022-08-16 PROCEDURE — 80048 BASIC METABOLIC PNL TOTAL CA: CPT | Performed by: PHYSICIAN ASSISTANT

## 2022-08-16 PROCEDURE — 36415 COLL VENOUS BLD VENIPUNCTURE: CPT | Performed by: PHYSICIAN ASSISTANT

## 2022-08-16 PROCEDURE — 99214 OFFICE O/P EST MOD 30 MIN: CPT | Performed by: PHYSICIAN ASSISTANT

## 2022-08-16 ASSESSMENT — ENCOUNTER SYMPTOMS
BACK PAIN: 1
FREQUENCY: 0
NERVOUS/ANXIOUS: 0
ABDOMINAL PAIN: 0
CHILLS: 0
FEVER: 0
SHORTNESS OF BREATH: 0
LIGHT-HEADEDNESS: 0
DYSURIA: 0
NAUSEA: 0
VOMITING: 0
UNEXPECTED WEIGHT CHANGE: 0
FATIGUE: 1

## 2022-08-16 ASSESSMENT — PAIN SCALES - GENERAL: PAINLEVEL: NO PAIN (0)

## 2022-08-16 NOTE — PATIENT INSTRUCTIONS
For further evaluation of your abnormal uterine bleeding we are completing lab work as well as an ultrasound.  Your lab work results will be sent to [facility] over the next 1-2 days.  To schedule your ultrasound, please call [phone].  We will contact you with ultrasound results and next steps Once available.    If you develop any severe symptoms such as severe bleeding defined as filling an entire pad in less than 2 hours, 2 times, lightheadedness, chest pain, shortness of breath, dizziness, please go to the emergency department.    Please reach out with questions or concerns.

## 2022-08-16 NOTE — LETTER
August 16, 2022      Ludmila Sanchez  [address] [place] [address]        To Whom It May Concern:    Ludmila Sanchez was seen in our clinic. She may return to work 8/19/22 without restrictions.      Sincerely,        Bri Connell PA-C

## 2022-08-16 NOTE — PROGRESS NOTES
"  Assessment & Plan     Post-menopausal bleeding  Acute low back pain without sciatica, unspecified laterality  Abdominal cramping  Patient is a 62-year-old female who presents to clinic due to 2 days of vaginal bleeding.  Patient is postmenopausal x11 years.  Vital signs without fever or tachycardia.  Physical exam significant for lower abdominal tenderness to palpation without rebound or guarding.  Given recent heavy bleeding and history of anemia related to blood loss, will complete labs.  Differential diagnosis includes thickened endometrial lining, fibroid, mass. Pelvic ultrasound for further evaluation ordered.  Patient will call to schedule.  Urgent follow precautions provided.  - CBC with platelets; Future  - Basic metabolic panel  (Ca, Cl, CO2, Creat, Gluc, K, Na, BUN); Future  - US Pelvic Complete with Transvaginal; Future      See Patient Instructions    Return in about 2 weeks (around 8/30/2022), or if symptoms worsen or fail to improve.    AVTAR Laureano [facility] YADIEL Sanchez is a 62 year old, presenting for the following health issues:  Vaginal Bleeding      HPI     Menstrual Concern    Description:   Duration of bleeding episodes: 2 days of vaginal bleeding which is new since menstrual cycle ended in 2011. She notes some back pain similar to when she had menstrual cycle. No prior surgeries other than a polyp removal many years ago. Patient has gone through 4-5 pads in the last 2 days.  She notes a heavy, mildly crampy feeling to lower abdomen.  Frequency of periods: (1st day of one to 1st day of next):  Pt has not had a menstrual cycle in \"years\"  Describe bleeding/flow:   Clots: YES  Number of pads/day: 4        Cramping: none  Accompanying Signs & Symptoms:  Lightheadedness: No  Temperature intolerance: No  Nosebleeds/Easy bruising: No  Vaginal Discharge: No  Acne: No  Change in body hair: No  History:  Patient's last menstrual period was " "11/01/2011.  Previous normal periods: YES  Contraceptive use: NO  Sexually active: No  Any bleeding after intercourse: N/A        Review of Systems   Constitutional: Positive for fatigue. Negative for chills, fever and unexpected weight change.   Respiratory: Negative for shortness of breath.    Cardiovascular: Negative for chest pain.   Gastrointestinal: Negative for abdominal pain, nausea and vomiting.   Genitourinary: Positive for vaginal bleeding. Negative for dysuria, frequency and urgency.   Musculoskeletal: Positive for back pain.   Skin: Negative for rash.   Neurological: Negative for light-headedness.   Psychiatric/Behavioral: The patient is not nervous/anxious.             Objective    BP (!) 150/77   Pulse 87   Temp 98  F (36.7  C) (Tympanic)   Resp 14   Ht 1.651 m (5' 5\")   Wt (!) 153.5 kg (338 lb 8 oz)   LMP 11/01/2011   SpO2 96%   Breastfeeding No   BMI 56.33 kg/m    Body mass index is 56.33 kg/m .  Physical Exam  Vitals and nursing note reviewed.   Constitutional:       General: She is not in acute distress.     Appearance: Normal appearance.   HENT:      Head: Normocephalic and atraumatic.   Eyes:      Extraocular Movements: Extraocular movements intact.      Pupils: Pupils are equal, round, and reactive to light.   Cardiovascular:      Rate and Rhythm: Normal rate and regular rhythm.      Heart sounds: Normal heart sounds.   Pulmonary:      Effort: Pulmonary effort is normal.      Breath sounds: Normal breath sounds.   Abdominal:      General: Bowel sounds are normal.      Palpations: Abdomen is soft.      Tenderness: There is abdominal tenderness (lower abdominal discomfort ). There is no guarding or rebound.   Musculoskeletal:         General: Normal range of motion.      Cervical back: Normal range of motion.   Skin:     General: Skin is warm and dry.   Neurological:      General: No focal deficit present.      Mental Status: She is alert.   Psychiatric:         Mood and Affect: Mood " normal.         Behavior: Behavior normal.                            .  ..

## 2022-08-17 ENCOUNTER — ANCILLARY PROCEDURE (OUTPATIENT)
Dept: ULTRASOUND IMAGING | Facility: CLINIC | Age: 62
End: 2022-08-17
Attending: PHYSICIAN ASSISTANT
Payer: COMMERCIAL

## 2022-08-17 DIAGNOSIS — N95.0 POST-MENOPAUSAL BLEEDING: ICD-10-CM

## 2022-08-17 PROCEDURE — 76830 TRANSVAGINAL US NON-OB: CPT | Mod: TC | Performed by: RADIOLOGY

## 2022-08-17 PROCEDURE — 76856 US EXAM PELVIC COMPLETE: CPT | Mod: TC | Performed by: RADIOLOGY

## 2022-08-18 DIAGNOSIS — N95.0 POST-MENOPAUSAL BLEEDING: Primary | ICD-10-CM

## 2022-08-18 DIAGNOSIS — R93.89 ENDOMETRIAL THICKENING ON ULTRASOUND: ICD-10-CM

## 2022-09-08 ENCOUNTER — TELEPHONE (OUTPATIENT)
Dept: GASTROENTEROLOGY | Facility: CLINIC | Age: 62
End: 2022-09-08

## 2022-09-08 DIAGNOSIS — Z12.11 ENCOUNTER FOR SCREENING COLONOSCOPY: Primary | ICD-10-CM

## 2022-09-08 RX ORDER — BISACODYL 5 MG/1
TABLET, DELAYED RELEASE ORAL
Qty: 4 TABLET | Refills: 0 | Status: SHIPPED | OUTPATIENT
Start: 2022-09-08 | End: 2022-11-01

## 2022-09-08 NOTE — TELEPHONE ENCOUNTER
FUTURE VISIT INFORMATION      SURGERY INFORMATION:    Date: 9/15/22    Location:  [facility]    Surgeon:  Tressa Novak MD    Anesthesia Type:  MAC    Procedure: COLONOSCOPY    RECORDS REQUESTED FROM:       Primary Care Provider: [facility]    Pertinent Medical History: TEODORO, hypertension    Most recent EKG+ Tracin22- [facility]    Most recent Cardiac Stress Test:10/20/11    Most recent Sleep Study:  12

## 2022-09-08 NOTE — TELEPHONE ENCOUNTER
Pre assessment questions completed for upcoming colonoscopy  procedure scheduled on 9/15/22    COVID policy reviewed. Patient to complete rapid antigen test one to two days before their scheduled procedure. Patient to bring photo of the results when they come in for their procedure.    Pre-op scheduled? This will need to be with PAC d/t BMI 56.33 and MAC sedation. Patient is aware and call transferred to [phone] to schedule after completion of pre assessment.     Reviewed procedural arrival time 1015 and facility location [facility].    Designated  policy reviewed. Instructed to have someone stay 24 hours post procedure.     Procedure indication: screening    Bowel prep recommendation: Extended prep d/t BMI    Extended prep script sent to [id number] - [place] [id number] [facility]. Prep instructions sent via [employer].    Reviewed Extended prep instructions with patient. No fiber/iron supplements or foods that contain nuts/seeds 7 days prior to procedure.     Anticoagulation/blood thinners? no    Patient verbalized understanding and had no questions or concerns at this time.    Ashlee Humphrey RN

## 2022-09-13 ENCOUNTER — OFFICE VISIT (OUTPATIENT)
Dept: OBGYN | Facility: CLINIC | Age: 62
End: 2022-09-13
Payer: COMMERCIAL

## 2022-09-13 ENCOUNTER — TELEPHONE (OUTPATIENT)
Dept: OBGYN | Facility: CLINIC | Age: 62
End: 2022-09-13

## 2022-09-13 VITALS
SYSTOLIC BLOOD PRESSURE: 137 MMHG | HEART RATE: 81 BPM | OXYGEN SATURATION: 96 % | DIASTOLIC BLOOD PRESSURE: 78 MMHG | WEIGHT: 293 LBS | BODY MASS INDEX: 55.93 KG/M2

## 2022-09-13 DIAGNOSIS — R93.89 ENDOMETRIAL THICKENING ON ULTRASOUND: ICD-10-CM

## 2022-09-13 DIAGNOSIS — N95.0 POSTMENOPAUSAL BLEEDING: Primary | ICD-10-CM

## 2022-09-13 PROCEDURE — 99203 OFFICE O/P NEW LOW 30 MIN: CPT | Performed by: OBSTETRICS & GYNECOLOGY

## 2022-09-13 NOTE — TELEPHONE ENCOUNTER
[facility] SURGERY PLANNING/SCHEDULING WORKSHEET                                                     Ludmila Sanchez                :  1960  MRN:  [id number]  Home Phone [phone]   Work Phone Not on file.   Mobile [phone]         Surgeon: Josh Augustine MD    DIAGNOSIS:   Postmenopausal bleeding, endometrial thickening on ultrasound     SURGICAL PROCEDURE:  Hysteroscopy and dilation and curettage, with MyoSure.     Surgery Location:  [facility]  Patient Surgery Class:  SDS  Length of Procedure:  30 minutes  Type of anesthesia:  MAC    Multi-surgeon case: No  OR Assistant needed:   No  Vendor needed: No  Positioning:  Lithotomy  Laterality:  NA  Date requested:  to be scheduled.     Special Equipment: MyoSure  Special Instructions for patient:  Per the [facility] Pre-Admission Nurse when they call the patient prior to the surgery date.   Precautions:  NONE  :  NOT NEEDED    Sterilization consent:  Not applicable to procedure being performed.    Preop: Pre-op options: PCP  Pre-surgery consult needed:  Not applicable.  Postop evaluation needed:  2 weeks    ALLERGIES:   Allergies   Allergen Reactions     Latex Rash     Contrast Dye Hives     Diatrizoate      Welt     No Clinical Screening - See Comments Hives     Pseudoephedrine Hives     Welts     Sudafed [Fd&C Red #40-Fd&C Yellow #6-Pse] Hives     Sulfa Drugs Hives     Topamax [Topiramate] Other (See Comments)     At 50 mg twice daily had cognitive impairment      BMI:There is no height or weight on file to calculate BMI.      The proposed surgical procedure is considered LOW risk.      Josh Augustine MD    2022     SURGERY SCHEDULING AND PRECERTIFICATION    Medical Record Number: [id number]  Ludmila Sanchez  YOB: 1960   Phone: [phone] (home)   Primary Provider: Ashlee Lorenzo    Reason for Admit:  ICD-10 CODE:  N95.0 R95.89    Surgeon: Josh Augustine MD  Surgical Procedure:  Hysteroscopy and dilation and curettage, with MyoSure.       Date of Surgery 10/10  1:30 PM  Surgery to be performed at:  [facility]  Status: Outpatient  Type of Anesthesia Anticipated: MAC    Sterilization consent:  Not applicable to procedure being performed.    Pre-Op: Already completed at the   COVID testing:  Covid testing is not required because the patient has been vaccinated, is not immunocompromised and does not have Covid symptoms per [facility] guidelines.   Post-Op:  2 weeks  10/25  Pre-certification routed to Financial Counselors:  Yes    Surgery packet mailed to patient's home address: Yes  Patient instructed NPO 12 hours prior to surgery, arrive 1.5 hours  prior to surgery, must have a .  Patient understood and agrees to the plan.      Requestor:  Anh Carnes     Location:  [place] [id number]

## 2022-09-13 NOTE — TELEPHONE ENCOUNTER
[facility] SURGERY PLANNING/SCHEDULING WORKSHEET                                                     Ludmlia Sanchez                :  1960  MRN:  [id number]  Home Phone [phone]   Work Phone Not on file.   Mobile [phone]         Surgeon: Josh Augustine MD    DIAGNOSIS:   Postmenopausal bleeding, endometrial thickening on ultrasound     SURGICAL PROCEDURE:  Hysteroscopy and dilation and curettage, with MyoSure.     Surgery Location:  [facility]  Patient Surgery Class:  SDS  Length of Procedure:  30 minutes  Type of anesthesia:  MAC    Multi-surgeon case: No  OR Assistant needed:   No  Vendor needed: No  Positioning:  Lithotomy  Laterality:  NA  Date requested:  to be scheduled.     Special Equipment: MyoSure  Special Instructions for patient:  Per the [facility] Pre-Admission Nurse when they call the patient prior to the surgery date.   Precautions:  NONE  :  NOT NEEDED    Sterilization consent:  Not applicable to procedure being performed.    Preop: Pre-op options: PCP  Pre-surgery consult needed:  Not applicable.  Postop evaluation needed:  2 weeks    ALLERGIES:   Allergies   Allergen Reactions     Latex Rash     Contrast Dye Hives     Diatrizoate      Welt     No Clinical Screening - See Comments Hives     Pseudoephedrine Hives     Welts     Sudafed [Fd&C Red #40-Fd&C Yellow #6-Pse] Hives     Sulfa Drugs Hives     Topamax [Topiramate] Other (See Comments)     At 50 mg twice daily had cognitive impairment      BMI:There is no height or weight on file to calculate BMI.      The proposed surgical procedure is considered LOW risk.      Josh Augustine MD    2022
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
## 2022-09-14 ENCOUNTER — OFFICE VISIT (OUTPATIENT)
Dept: SURGERY | Facility: CLINIC | Age: 62
End: 2022-09-14
Payer: COMMERCIAL

## 2022-09-14 ENCOUNTER — ANESTHESIA EVENT (OUTPATIENT)
Dept: GASTROENTEROLOGY | Facility: CLINIC | Age: 62
End: 2022-09-14
Payer: COMMERCIAL

## 2022-09-14 ENCOUNTER — PRE VISIT (OUTPATIENT)
Dept: SURGERY | Facility: CLINIC | Age: 62
End: 2022-09-14

## 2022-09-14 VITALS
WEIGHT: 293 LBS | BODY MASS INDEX: 48.82 KG/M2 | HEIGHT: 65 IN | SYSTOLIC BLOOD PRESSURE: 134 MMHG | OXYGEN SATURATION: 98 % | DIASTOLIC BLOOD PRESSURE: 80 MMHG | HEART RATE: 76 BPM | TEMPERATURE: 98 F | RESPIRATION RATE: 20 BRPM

## 2022-09-14 DIAGNOSIS — Z01.818 PREOP EXAMINATION: Primary | ICD-10-CM

## 2022-09-14 PROCEDURE — 99203 OFFICE O/P NEW LOW 30 MIN: CPT | Performed by: NURSE PRACTITIONER

## 2022-09-14 ASSESSMENT — PAIN SCALES - GENERAL: PAINLEVEL: NO PAIN (0)

## 2022-09-14 ASSESSMENT — LIFESTYLE VARIABLES: TOBACCO_USE: 1
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
## 2022-09-15 ENCOUNTER — HOSPITAL ENCOUNTER (OUTPATIENT)
Facility: CLINIC | Age: 62
Discharge: HOME | End: 2022-09-15
Attending: INTERNAL MEDICINE | Admitting: INTERNAL MEDICINE
Payer: COMMERCIAL

## 2022-09-15 ENCOUNTER — ANESTHESIA (OUTPATIENT)
Dept: GASTROENTEROLOGY | Facility: CLINIC | Age: 62
End: 2022-09-15
Payer: COMMERCIAL

## 2022-09-15 VITALS
BODY MASS INDEX: 48.82 KG/M2 | SYSTOLIC BLOOD PRESSURE: 92 MMHG | WEIGHT: 293 LBS | DIASTOLIC BLOOD PRESSURE: 53 MMHG | OXYGEN SATURATION: 100 % | HEART RATE: 87 BPM | TEMPERATURE: 98.1 F | HEIGHT: 65 IN | RESPIRATION RATE: 18 BRPM

## 2022-09-15 DIAGNOSIS — D12.6 ADENOMATOUS POLYP OF COLON, UNSPECIFIED PART OF COLON: Primary | ICD-10-CM

## 2022-09-15 LAB — COLONOSCOPY: NORMAL

## 2022-09-15 PROCEDURE — 250N000011 HC RX IP 250 OP 636

## 2022-09-15 PROCEDURE — 370N000017 HC ANESTHESIA TECHNICAL FEE, PER MIN: Performed by: INTERNAL MEDICINE

## 2022-09-15 PROCEDURE — 45380 COLONOSCOPY AND BIOPSY: CPT | Performed by: INTERNAL MEDICINE

## 2022-09-15 PROCEDURE — 250N000009 HC RX 250

## 2022-09-15 PROCEDURE — 88305 TISSUE EXAM BY PATHOLOGIST: CPT | Mod: TC | Performed by: INTERNAL MEDICINE

## 2022-09-15 PROCEDURE — 258N000003 HC RX IP 258 OP 636

## 2022-09-15 RX ORDER — MEPERIDINE HYDROCHLORIDE 25 MG/ML
12.5 INJECTION INTRAMUSCULAR; INTRAVENOUS; SUBCUTANEOUS
Status: DISCONTINUED | OUTPATIENT
Start: 2022-09-15 | End: 2022-09-15 | Stop reason: HOSPADM

## 2022-09-15 RX ORDER — LIDOCAINE HYDROCHLORIDE 20 MG/ML
INJECTION, SOLUTION INFILTRATION; PERINEURAL PRN
Status: DISCONTINUED | OUTPATIENT
Start: 2022-09-15 | End: 2022-09-15

## 2022-09-15 RX ORDER — PROCHLORPERAZINE MALEATE 10 MG
10 TABLET ORAL EVERY 6 HOURS PRN
Status: DISCONTINUED | OUTPATIENT
Start: 2022-09-15 | End: 2022-09-15 | Stop reason: HOSPADM

## 2022-09-15 RX ORDER — NALOXONE HYDROCHLORIDE 0.4 MG/ML
0.4 INJECTION, SOLUTION INTRAMUSCULAR; INTRAVENOUS; SUBCUTANEOUS
Status: DISCONTINUED | OUTPATIENT
Start: 2022-09-15 | End: 2022-09-15 | Stop reason: HOSPADM

## 2022-09-15 RX ORDER — SODIUM CHLORIDE, SODIUM LACTATE, POTASSIUM CHLORIDE, CALCIUM CHLORIDE 600; 310; 30; 20 MG/100ML; MG/100ML; MG/100ML; MG/100ML
INJECTION, SOLUTION INTRAVENOUS CONTINUOUS
Status: DISCONTINUED | OUTPATIENT
Start: 2022-09-15 | End: 2022-09-15 | Stop reason: HOSPADM

## 2022-09-15 RX ORDER — ONDANSETRON 4 MG/1
4 TABLET, ORALLY DISINTEGRATING ORAL EVERY 30 MIN PRN
Status: DISCONTINUED | OUTPATIENT
Start: 2022-09-15 | End: 2022-09-15 | Stop reason: HOSPADM

## 2022-09-15 RX ORDER — SODIUM CHLORIDE, SODIUM LACTATE, POTASSIUM CHLORIDE, CALCIUM CHLORIDE 600; 310; 30; 20 MG/100ML; MG/100ML; MG/100ML; MG/100ML
INJECTION, SOLUTION INTRAVENOUS CONTINUOUS PRN
Status: DISCONTINUED | OUTPATIENT
Start: 2022-09-15 | End: 2022-09-15

## 2022-09-15 RX ORDER — NALOXONE HYDROCHLORIDE 0.4 MG/ML
0.2 INJECTION, SOLUTION INTRAMUSCULAR; INTRAVENOUS; SUBCUTANEOUS
Status: DISCONTINUED | OUTPATIENT
Start: 2022-09-15 | End: 2022-09-15 | Stop reason: HOSPADM

## 2022-09-15 RX ORDER — FENTANYL CITRATE 50 UG/ML
25 INJECTION, SOLUTION INTRAMUSCULAR; INTRAVENOUS EVERY 5 MIN PRN
Status: DISCONTINUED | OUTPATIENT
Start: 2022-09-15 | End: 2022-09-15 | Stop reason: HOSPADM

## 2022-09-15 RX ORDER — ONDANSETRON 2 MG/ML
4 INJECTION INTRAMUSCULAR; INTRAVENOUS EVERY 30 MIN PRN
Status: DISCONTINUED | OUTPATIENT
Start: 2022-09-15 | End: 2022-09-15 | Stop reason: HOSPADM

## 2022-09-15 RX ORDER — ONDANSETRON 4 MG/1
4 TABLET, ORALLY DISINTEGRATING ORAL EVERY 6 HOURS PRN
Status: DISCONTINUED | OUTPATIENT
Start: 2022-09-15 | End: 2022-09-15 | Stop reason: HOSPADM

## 2022-09-15 RX ORDER — ONDANSETRON 2 MG/ML
4 INJECTION INTRAMUSCULAR; INTRAVENOUS EVERY 6 HOURS PRN
Status: DISCONTINUED | OUTPATIENT
Start: 2022-09-15 | End: 2022-09-15 | Stop reason: HOSPADM

## 2022-09-15 RX ORDER — HYDROMORPHONE HYDROCHLORIDE 1 MG/ML
0.2 INJECTION, SOLUTION INTRAMUSCULAR; INTRAVENOUS; SUBCUTANEOUS EVERY 5 MIN PRN
Status: DISCONTINUED | OUTPATIENT
Start: 2022-09-15 | End: 2022-09-15 | Stop reason: HOSPADM

## 2022-09-15 RX ORDER — PROPOFOL 10 MG/ML
INJECTION, EMULSION INTRAVENOUS CONTINUOUS PRN
Status: DISCONTINUED | OUTPATIENT
Start: 2022-09-15 | End: 2022-09-15

## 2022-09-15 RX ORDER — FENTANYL CITRATE 50 UG/ML
25 INJECTION, SOLUTION INTRAMUSCULAR; INTRAVENOUS
Status: DISCONTINUED | OUTPATIENT
Start: 2022-09-15 | End: 2022-09-15 | Stop reason: HOSPADM

## 2022-09-15 RX ORDER — LIDOCAINE 40 MG/G
CREAM TOPICAL
Status: DISCONTINUED | OUTPATIENT
Start: 2022-09-15 | End: 2022-09-15 | Stop reason: HOSPADM

## 2022-09-15 RX ORDER — FLUMAZENIL 0.1 MG/ML
0.2 INJECTION, SOLUTION INTRAVENOUS
Status: DISCONTINUED | OUTPATIENT
Start: 2022-09-15 | End: 2022-09-15 | Stop reason: HOSPADM

## 2022-09-15 RX ORDER — OXYCODONE HYDROCHLORIDE 5 MG/1
5 TABLET ORAL EVERY 4 HOURS PRN
Status: DISCONTINUED | OUTPATIENT
Start: 2022-09-15 | End: 2022-09-15 | Stop reason: HOSPADM

## 2022-09-15 RX ADMIN — SODIUM CHLORIDE, POTASSIUM CHLORIDE, SODIUM LACTATE AND CALCIUM CHLORIDE: 600; 310; 30; 20 INJECTION, SOLUTION INTRAVENOUS at 11:55

## 2022-09-15 RX ADMIN — LIDOCAINE HYDROCHLORIDE 80 MG: 20 INJECTION, SOLUTION INFILTRATION; PERINEURAL at 11:55

## 2022-09-15 RX ADMIN — PROPOFOL 150 MCG/KG/MIN: 10 INJECTION, EMULSION INTRAVENOUS at 11:55

## 2022-09-15 RX ADMIN — PROPOFOL 50 MG: 10 INJECTION, EMULSION INTRAVENOUS at 12:11

## 2022-09-15 RX ADMIN — PROPOFOL 50 MG: 10 INJECTION, EMULSION INTRAVENOUS at 12:02

## 2022-09-15 RX ADMIN — PROPOFOL 50 MG: 10 INJECTION, EMULSION INTRAVENOUS at 11:58

## 2022-09-15 ASSESSMENT — ACTIVITIES OF DAILY LIVING (ADL)
ADLS_ACUITY_SCORE: 35
ADLS_ACUITY_SCORE: 35

## 2022-09-15 NOTE — ANESTHESIA CARE TRANSFER NOTE
Patient: Ludmila Sanchez    Procedure: Procedure(s):  COLONOSCOPY, WITH POLYPECTOMY AND BIOPSY       Diagnosis: Screen for colon cancer [Z12.11]  Diagnosis Additional Information: No value filed.    Anesthesia Type:   MAC     Note:    Oropharynx: oropharynx clear of all foreign objects and spontaneously breathing  Level of Consciousness: awake  Oxygen Supplementation: room air    Independent Airway: airway patency satisfactory and stable  Dentition: dentition unchanged  Vital Signs Stable: post-procedure vital signs reviewed and stable  Report to RN Given: handoff report given  Patient transferred to: Phase II    Handoff Report: Identifed the Patient, Identified the Reponsible Provider, Reviewed the pertinent medical history, Discussed the surgical course, Reviewed Intra-OP anesthesia mangement and issues during anesthesia, Set expectations for post-procedure period and Allowed opportunity for questions and acknowledgement of understanding      Vitals:  Vitals Value Taken Time   BP 92/53 09/15/22 1225   Temp     Pulse 87 09/15/22 1225   Resp 14    SpO2 98 % 09/15/22 1227   Vitals shown include unvalidated device data.    Electronically Signed By: JUANJOSE Queen CRNA  September 15, 2022  12:28 PM

## 2022-09-15 NOTE — ANESTHESIA POSTPROCEDURE EVALUATION
Patient: Ludmila Sanchez    Procedure: Procedure(s):  COLONOSCOPY, WITH POLYPECTOMY AND BIOPSY       Anesthesia Type:  MAC    Note:  Disposition: Outpatient   Postop Pain Control: Uneventful            Sign Out: Well controlled pain   PONV: No   Neuro/Psych: Uneventful            Sign Out: Acceptable/Baseline neuro status   Airway/Respiratory: Uneventful            Sign Out: Acceptable/Baseline resp. status   CV/Hemodynamics: Uneventful            Sign Out: Acceptable CV status; No obvious hypovolemia; No obvious fluid overload   Other NRE: NONE   DID A NON-ROUTINE EVENT OCCUR? No           Last vitals:  Vitals Value Taken Time   /43 09/15/22 1238   Temp 36.7  C (98.1  F) 09/15/22 1226   Pulse 74 09/15/22 1238   Resp     SpO2 84 % 09/15/22 1246   Vitals shown include unvalidated device data.    Electronically Signed By: Sidney Santizo MD  September 15, 2022  1:05 PM
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
## 2022-09-15 NOTE — OR NURSING
Pt tolerated colonoscopy with single polypectomy, very well under MAC. Polyp was removed with a cold biopsy forceps

## 2022-09-16 LAB
PATH REPORT.COMMENTS IMP SPEC: NORMAL
PATH REPORT.COMMENTS IMP SPEC: NORMAL
PATH REPORT.FINAL DX SPEC: NORMAL
PATH REPORT.GROSS SPEC: NORMAL
PATH REPORT.MICROSCOPIC SPEC OTHER STN: NORMAL
PATH REPORT.RELEVANT HX SPEC: NORMAL
PHOTO IMAGE: NORMAL

## 2022-09-16 PROCEDURE — 88305 TISSUE EXAM BY PATHOLOGIST: CPT | Mod: 26 | Performed by: PATHOLOGY

## 2022-09-22 NOTE — TELEPHONE ENCOUNTER
PB DOS: 10/10/2022 *will need to contact insurance after 9am  Type of Procedure: Hysteroscopy and dilation and curettage, with MyoSure  CPT Codes: 78458  ICD10 Codes: Postmenopausal bleeding, endometrial thickening on ultrasound [N95.0 R95.89]  Surgeon/Ordering provider: Josh Augustine MD  Pre-cert/Authorization completed:    Payer: [facility] [employer]   Spoke to [employer]   Ref. # / Auth #   Valid Dates:

## 2022-09-22 NOTE — TELEPHONE ENCOUNTER
PB DOS: 10/10/2022   Type of Procedure: Hysteroscopy and dilation and curettage, with MyoSure x same day surgery  CPT Codes: 07631  ICD10 Codes: Postmenopausal bleeding, endometrial thickening on ultrasound [N95.0 R93.89]  Surgeon/Ordering provider: Josh Augustine MD [id number]  Pre-cert/Authorization completed: Approved   Payer: [facility] [employer]   Spoke to Kristie from [employer]   Ref. # / Auth #  [id number]  Valid Dates: 9/22/2022-10/10/2022    [facility] [facility] Form, Facesheet, and Clinicals faxed to [facility]. Please have patient verify coverage and benefits with [facility] [employer].

## 2022-10-07 ENCOUNTER — TELEPHONE (OUTPATIENT)
Dept: OBGYN | Facility: CLINIC | Age: 62
End: 2022-10-07

## 2022-10-07 NOTE — TELEPHONE ENCOUNTER
Reason for Call:  Form, our goal is to have forms completed with 72 hours, however, some forms may require a visit or additional information.    Type of letter, form or note:  employer    Who is the form from?: Patient    Where did the form come from: Patient or family brought in       What clinic location was the form placed at?: [facility]    Where the form was placed: put in mail box Box/Folder    What number is listed as a contact on the form?: [phone]       Additional comments: Please fax    Call taken on 10/7/2022 at 2:53 PM by Karissa Lester

## 2022-10-11 NOTE — TELEPHONE ENCOUNTER
I called patient to clarify what days she is off. She had surgery 10/10/22 plans of 10/11/22 also RTW 10/12/22.  Forms were filled out and signed by Dr. Augustine.  Faxed to [facility] [phone].  A copy will be kept in [facility] and original sent to kailash Serrato MA 10/11/2022

## 2022-10-18 DIAGNOSIS — F31.31 BIPOLAR AFFECTIVE DISORDER, CURRENTLY DEPRESSED, MILD (H): ICD-10-CM

## 2022-10-19 RX ORDER — SERTRALINE HYDROCHLORIDE 100 MG/1
TABLET, FILM COATED ORAL
Qty: 90 TABLET | Refills: 0 | Status: SHIPPED | OUTPATIENT
Start: 2022-10-19 | End: 2022-11-01

## 2022-10-23 ENCOUNTER — HEALTH MAINTENANCE LETTER (OUTPATIENT)
Age: 62
End: 2022-10-23

## 2022-10-25 ENCOUNTER — OFFICE VISIT (OUTPATIENT)
Dept: OBGYN | Facility: CLINIC | Age: 62
End: 2022-10-25
Payer: COMMERCIAL

## 2022-10-25 VITALS
HEART RATE: 67 BPM | DIASTOLIC BLOOD PRESSURE: 76 MMHG | SYSTOLIC BLOOD PRESSURE: 154 MMHG | WEIGHT: 293 LBS | BODY MASS INDEX: 56.08 KG/M2 | OXYGEN SATURATION: 96 %

## 2022-10-25 DIAGNOSIS — N95.0 POSTMENOPAUSAL BLEEDING: Primary | ICD-10-CM

## 2022-10-25 PROCEDURE — 99212 OFFICE O/P EST SF 10 MIN: CPT | Performed by: OBSTETRICS & GYNECOLOGY

## 2022-10-25 NOTE — PROGRESS NOTES
Ludmila is a 62 year old postmenopausal ,   She underwent the D&C (hysteroscopy was unable to be performed) on 10/10.  She has not had any pain, bleeding or hot flashes.  Energy level is normal.  Denies fever, chills.    We reviewed the operative report and the pathology results.    Case Report   Surgical Pathology                                Case: [id number]                                    Authorizing Provider:  Josh Augustine MD        Collected:           10/10/2022 02:02 PM           Ordering Location:     [facility]       Received:            10/10/2022 03:14 PM                                  Operating Room                                                                Pathologist:           Prakash Caldwell MD                                                      Specimens:   A) - Endocervix, Curettage                                                                           B) - Endometrium, Curettage                                                                Final Diagnosis   A.  Endocervix, curettage - No pathologic alterations.     B.  Endometrium, curettage - Inactive endometrium.   Electronically signed by Prakash Caldwell MD on 10/12/2022 at 1123   Gross Description    A. Endocervical curettings: Is a 2.7 x 0.5 x 0.1 cm aggregate of tan, irregular soft tissue fragments admixed with mucus. Entire specimen submitted in 1 block.         B. Endometrial curettings: Is a 2.6 x 0.8 x 0.2 cm aggregate of brown, irregular soft tissue fragments admixed with blood clot. Entire specimen submitted in 1 block. MS         Microscopic Description    A.  Sections show fragments of benign endocervical glandular and ectocervical squamous mucosa.  There is no evidence of intraglandular neoplasia, squamous intraepithelial lesion or malignancy.     B.  Sections show inactive endometrium.  There are also admixed fragments of benign endocervical glandular and ectocervical squamous mucosa.   There is no evidence of chronic endometritis.  There is no hyperplasia, atypia or malignancy.       The medical history, social history and family history have been reviewed and updated as indicated.      ROS:   ROS:4 point ROS including Respiratory, CV, GI and , other than that noted in the HPI and the PMH, is negative     PE: BP (!) 154/76 (BP Location: Right arm, Patient Position: Sitting, Cuff Size: Adult Regular)   Pulse 67   Wt (!) 152.9 kg (337 lb)   LMP 11/01/2011   SpO2 96%   BMI 56.08 kg/m    General:  WNWD female, NAD  Alert  Oriented x 3  Gait:  Normal  Skin:  Normal skin turgor  HEENT:  NC/AT, EOMI  Abdomen:  Non-tender, non-distended.  Pelvic exam:  Not performed  Extremities:  No clubbing, no cyanosis and no edema.      ASSESSMENT:  Postmenopausal bleeding       PLAN:  OK to resume routine care.   She should let me know bleeding she might have.  Should she have bleeding outside of the next 4 to 6 months, she might need to have additional evaluation.     Josh Augustine MD

## 2022-11-01 ENCOUNTER — OFFICE VISIT (OUTPATIENT)
Dept: FAMILY MEDICINE | Facility: CLINIC | Age: 62
End: 2022-11-01
Payer: COMMERCIAL

## 2022-11-01 VITALS
DIASTOLIC BLOOD PRESSURE: 74 MMHG | TEMPERATURE: 97.6 F | BODY MASS INDEX: 48.82 KG/M2 | HEIGHT: 65 IN | OXYGEN SATURATION: 99 % | HEART RATE: 78 BPM | SYSTOLIC BLOOD PRESSURE: 124 MMHG | WEIGHT: 293 LBS

## 2022-11-01 DIAGNOSIS — E66.01 MORBID OBESITY DUE TO EXCESS CALORIES (H): ICD-10-CM

## 2022-11-01 DIAGNOSIS — G47.33 OSA (OBSTRUCTIVE SLEEP APNEA): ICD-10-CM

## 2022-11-01 DIAGNOSIS — D12.6 ADENOMATOUS POLYP OF COLON, UNSPECIFIED PART OF COLON: ICD-10-CM

## 2022-11-01 DIAGNOSIS — L98.9 SKIN LESION: ICD-10-CM

## 2022-11-01 DIAGNOSIS — F31.31 BIPOLAR AFFECTIVE DISORDER, CURRENTLY DEPRESSED, MILD (H): ICD-10-CM

## 2022-11-01 DIAGNOSIS — E78.5 HYPERLIPIDEMIA LDL GOAL <100: ICD-10-CM

## 2022-11-01 DIAGNOSIS — Z23 HIGH PRIORITY FOR 2019-NCOV VACCINE: ICD-10-CM

## 2022-11-01 DIAGNOSIS — R73.01 IFG (IMPAIRED FASTING GLUCOSE): ICD-10-CM

## 2022-11-01 DIAGNOSIS — Z86.79 HISTORY OF HYPERTENSION: ICD-10-CM

## 2022-11-01 DIAGNOSIS — Z23 NEED FOR PROPHYLACTIC VACCINATION AND INOCULATION AGAINST INFLUENZA: ICD-10-CM

## 2022-11-01 DIAGNOSIS — E55.9 VITAMIN D DEFICIENCY: ICD-10-CM

## 2022-11-01 DIAGNOSIS — Z00.00 ROUTINE HISTORY AND PHYSICAL EXAMINATION OF ADULT: ICD-10-CM

## 2022-11-01 DIAGNOSIS — E66.01 MORBID OBESITY (H): ICD-10-CM

## 2022-11-01 LAB — HBA1C MFR BLD: 5.9 % (ref 0–5.6)

## 2022-11-01 PROCEDURE — 91312 COVID-19,PF,PFIZER BOOSTER BIVALENT: CPT | Performed by: FAMILY MEDICINE

## 2022-11-01 PROCEDURE — 99214 OFFICE O/P EST MOD 30 MIN: CPT | Mod: 25 | Performed by: FAMILY MEDICINE

## 2022-11-01 PROCEDURE — 83036 HEMOGLOBIN GLYCOSYLATED A1C: CPT | Performed by: FAMILY MEDICINE

## 2022-11-01 PROCEDURE — 99396 PREV VISIT EST AGE 40-64: CPT | Mod: 25 | Performed by: FAMILY MEDICINE

## 2022-11-01 PROCEDURE — 90471 IMMUNIZATION ADMIN: CPT | Performed by: FAMILY MEDICINE

## 2022-11-01 PROCEDURE — 36415 COLL VENOUS BLD VENIPUNCTURE: CPT | Performed by: FAMILY MEDICINE

## 2022-11-01 PROCEDURE — 90682 RIV4 VACC RECOMBINANT DNA IM: CPT | Performed by: FAMILY MEDICINE

## 2022-11-01 PROCEDURE — 80061 LIPID PANEL: CPT | Performed by: FAMILY MEDICINE

## 2022-11-01 PROCEDURE — 0124A COVID-19,PF,PFIZER BOOSTER BIVALENT: CPT | Performed by: FAMILY MEDICINE

## 2022-11-01 RX ORDER — SERTRALINE HYDROCHLORIDE 100 MG/1
100 TABLET, FILM COATED ORAL DAILY
Qty: 90 TABLET | Refills: 1 | Status: SHIPPED | OUTPATIENT
Start: 2022-11-01 | End: 2023-07-13

## 2022-11-01 ASSESSMENT — ENCOUNTER SYMPTOMS
JOINT SWELLING: 0
MYALGIAS: 0
FEVER: 0
DIARRHEA: 0
HEADACHES: 0
PARESTHESIAS: 0
BREAST MASS: 0
ARTHRALGIAS: 0
WEAKNESS: 0
DYSURIA: 0
CHILLS: 0
COUGH: 0
NAUSEA: 0
ABDOMINAL PAIN: 0
PALPITATIONS: 0
NERVOUS/ANXIOUS: 0
DIZZINESS: 0
CONSTIPATION: 0
HEMATURIA: 0
SORE THROAT: 0
SHORTNESS OF BREATH: 0
FREQUENCY: 0
EYE PAIN: 0
HEMATOCHEZIA: 0
HEARTBURN: 0

## 2022-11-01 ASSESSMENT — PAIN SCALES - GENERAL: PAINLEVEL: NO PAIN (0)

## 2022-11-01 ASSESSMENT — PATIENT HEALTH QUESTIONNAIRE - PHQ9: SUM OF ALL RESPONSES TO PHQ QUESTIONS 1-9: 0
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
## 2022-11-02 LAB
CHOLEST SERPL-MCNC: 196 MG/DL
FASTING STATUS PATIENT QL REPORTED: NO
HDLC SERPL-MCNC: 42 MG/DL
LDLC SERPL CALC-MCNC: 129 MG/DL
NONHDLC SERPL-MCNC: 154 MG/DL
TRIGL SERPL-MCNC: 126 MG/DL

## 2022-11-03 PROBLEM — M54.6 PAIN IN THORACIC SPINE: Status: RESOLVED | Noted: 2022-08-09 | Resolved: 2022-11-03

## 2022-11-03 NOTE — PROGRESS NOTES
Discharge Note    Progress reporting period is from initial evaluation date (please see noted date below) to Aug 15, 2022.  Linked Episodes   Type: Episode: Status: Noted: Resolved: Last update: Updated by:   PHYSICAL THERAPY R sided back pain [id number] Active 8/9/2022  8/15/2022  1:27 PM Kelly Miller, PT      Comments:       Ludmila failed to follow up and current status is unknown.  Please see information below for last relevant information on current status.  Patient seen for 2 visits.    SUBJECTIVE  Subjective changes noted by patient:  Original back pain feels better on the right, but woke up this morning with blood on her sheets. She went through menopause when she was 46. She went to the bathroom and something came out, she thinks of her vaginal canal as she did not have a bowel movement. Sending a message to Dr. Schmitz today. She does not have any malaise, fever, burning with urination. She is still bleeding, wearing a pad and changed it 2x today.  .  Current pain level is  .     Previous pain level was  10/10.   Changes in function:  Yes (See Goal flowsheet attached for changes in current functional level)  Adverse reaction to treatment or activity: None    OBJECTIVE  Changes noted in objective findings: TTP at L1-2 on R. No ecchymosis or other color/temperature change.     ASSESSMENT/PLAN  Diagnosis: R sided thoracic pain   Updated problem list and treatment plan:   Decreased function - HEP  STG/LTGs have been met or progress has been made towards goals:  Yes, please see goal flowsheet for most current information  Assessment of Progress: current status is unknown.    Last current status:     Self Management Plans:  HEP  I have re-evaluated this patient and find that the nature, scope, duration and intensity of the therapy is appropriate for the medical condition of the patient.  Ludmila continues to require the following intervention to meet STG and LTG's:  HEP.    Recommendations:  Discharge with current home  program.  Patient to follow up with MD as needed.    Please refer to the daily flowsheet for treatment today, total treatment time and time spent performing 1:1 timed codes.

## 2022-12-14 ENCOUNTER — MYC MEDICAL ADVICE (OUTPATIENT)
Dept: FAMILY MEDICINE | Facility: CLINIC | Age: 62
End: 2022-12-14

## 2022-12-14 ENCOUNTER — TELEPHONE (OUTPATIENT)
Dept: NEUROSURGERY | Facility: CLINIC | Age: 62
End: 2022-12-14

## 2022-12-14 ENCOUNTER — TELEPHONE (OUTPATIENT)
Dept: FAMILY MEDICINE | Facility: CLINIC | Age: 62
End: 2022-12-14

## 2022-12-14 ENCOUNTER — VIRTUAL VISIT (OUTPATIENT)
Dept: PEDIATRICS | Facility: CLINIC | Age: 62
End: 2022-12-14
Payer: COMMERCIAL

## 2022-12-14 DIAGNOSIS — R50.9 FEVER, UNSPECIFIED FEVER CAUSE: Primary | ICD-10-CM

## 2022-12-14 PROCEDURE — 99213 OFFICE O/P EST LOW 20 MIN: CPT | Mod: 95 | Performed by: PHYSICIAN ASSISTANT

## 2022-12-14 NOTE — TELEPHONE ENCOUNTER
Patient has suspected covid. Writer called to offer reschedule of clinic appointment for tomorrow. She advised she would go to ED for her back pain rather than r/s at this time.

## 2022-12-14 NOTE — PROGRESS NOTES
Ludmila is a 62 year old who is being evaluated via a billable telephone visit.         Assessment & Plan     Fever, unspecified fever cause  Proceed with cultures for further evaluation.  - Influenza A & B Antigen - Clinic Collect  - Symptomatic COVID-19 Virus (Coronavirus) by PCR Nasopharyngeal; Future    [facility]    Subjective   Ludmila is a 62 year old presenting for the following health issues:  No chief complaint on file.      HPI   5 days ago patient had one episode of vomiting. Diarrhea 3 days ago. Fevers x 101 on/off x 5 days. Body aches and fatigue. Sore throat. Patient has tickle in throat. No ear pain. No nasal congestion.     No abdominal pain or cramping. No difficulty urinating.     Grandson was ill.      Review of Systems   Constitutional, HEENT, cardiovascular, pulmonary, gi and gu systems are negative, except as otherwise noted.      Objective           Vitals:  No vitals were obtained today due to virtual visit.    Physical Exam   alert and no distress  PSYCH: Alert and oriented times 3; coherent speech, normal   rate and volume, able to articulate logical thoughts, able   to abstract reason, no tangential thoughts, no hallucinations   or delusions  Her affect is normal  RESP: No cough, no audible wheezing, able to talk in full sentences  Remainder of exam unable to be completed due to telephone visits       Results for orders placed or performed in visit on 12/14/22   Influenza A & B Antigen - Clinic Collect     Status: Normal    Specimen: Nose; Swab   Result Value Ref Range    Influenza A antigen Negative Negative    Influenza B antigen Negative Negative    Narrative    Test results must be correlated with clinical data. If necessary, results should be confirmed by a molecular assay or viral culture.         Phone call duration: 6 minutes

## 2022-12-14 NOTE — TELEPHONE ENCOUNTER
Patient called could not make her Ancillary appt today needs to get in tomorrow for a Covid/Flu swab please call and advise as there are no openings some hold spots.  Cata Novak   [facility]

## 2022-12-15 ENCOUNTER — LAB (OUTPATIENT)
Dept: URGENT CARE | Facility: URGENT CARE | Age: 62
End: 2022-12-15
Attending: PHYSICIAN ASSISTANT
Payer: COMMERCIAL

## 2022-12-15 DIAGNOSIS — R50.9 FEVER, UNSPECIFIED FEVER CAUSE: ICD-10-CM

## 2022-12-15 LAB
FLUAV AG SPEC QL IA: NEGATIVE
FLUBV AG SPEC QL IA: NEGATIVE
SARS-COV-2 RNA RESP QL NAA+PROBE: NEGATIVE

## 2022-12-15 PROCEDURE — U0003 INFECTIOUS AGENT DETECTION BY NUCLEIC ACID (DNA OR RNA); SEVERE ACUTE RESPIRATORY SYNDROME CORONAVIRUS 2 (SARS-COV-2) (CORONAVIRUS DISEASE [COVID-19]), AMPLIFIED PROBE TECHNIQUE, MAKING USE OF HIGH THROUGHPUT TECHNOLOGIES AS DESCRIBED BY CMS-2020-01-R: HCPCS

## 2022-12-15 PROCEDURE — U0005 INFEC AGEN DETEC AMPLI PROBE: HCPCS

## 2022-12-15 PROCEDURE — 87804 INFLUENZA ASSAY W/OPTIC: CPT | Performed by: PHYSICIAN ASSISTANT

## 2022-12-15 NOTE — TELEPHONE ENCOUNTER
María message sent to patient with providers message.    SHEELA MukherjeeN RN  [facility]

## 2022-12-15 NOTE — TELEPHONE ENCOUNTER
Patient made appointment, nothing else needed.       Tete HARRINGTON CMA ([facility])

## 2022-12-15 NOTE — TELEPHONE ENCOUNTER
Called patient advised we do not have openings in [place], patient transferred to scheduling line to check other locations.

## 2022-12-15 NOTE — PROGRESS NOTES
COVID-19 PCR,Flu tests completed. Patient handout For Patients Who Have Been Tested for Covid-19 (Coronavirus) was given to the patient, which includes test result notification process.

## 2022-12-16 ENCOUNTER — MYC MEDICAL ADVICE (OUTPATIENT)
Dept: PEDIATRICS | Facility: CLINIC | Age: 62
End: 2022-12-16

## 2023-01-26 ENCOUNTER — OFFICE VISIT (OUTPATIENT)
Dept: SURGERY | Facility: CLINIC | Age: 63
End: 2023-01-26
Payer: COMMERCIAL

## 2023-01-26 ENCOUNTER — LAB (OUTPATIENT)
Dept: LAB | Facility: CLINIC | Age: 63
End: 2023-01-26
Payer: COMMERCIAL

## 2023-01-26 VITALS
BODY MASS INDEX: 48.82 KG/M2 | WEIGHT: 293 LBS | HEIGHT: 65 IN | SYSTOLIC BLOOD PRESSURE: 130 MMHG | DIASTOLIC BLOOD PRESSURE: 82 MMHG

## 2023-01-26 DIAGNOSIS — E88.810 METABOLIC SYNDROME: ICD-10-CM

## 2023-01-26 DIAGNOSIS — R73.09 ELEVATED HEMOGLOBIN A1C: ICD-10-CM

## 2023-01-26 DIAGNOSIS — G47.33 OSA (OBSTRUCTIVE SLEEP APNEA): ICD-10-CM

## 2023-01-26 LAB
ALBUMIN SERPL BCG-MCNC: 4.4 G/DL (ref 3.5–5.2)
ALP SERPL-CCNC: 101 U/L (ref 35–104)
ALT SERPL W P-5'-P-CCNC: 28 U/L (ref 10–35)
AST SERPL W P-5'-P-CCNC: 27 U/L (ref 10–35)
BILIRUB DIRECT SERPL-MCNC: <0.2 MG/DL (ref 0–0.3)
BILIRUB SERPL-MCNC: 0.3 MG/DL
DEPRECATED CALCIDIOL+CALCIFEROL SERPL-MC: 25 UG/L (ref 20–75)
FERRITIN SERPL-MCNC: 104 NG/ML (ref 11–328)
FOLATE SERPL-MCNC: 13.8 NG/ML (ref 4.6–34.8)
PROT SERPL-MCNC: 7.1 G/DL (ref 6.4–8.3)
PTH-INTACT SERPL-MCNC: 27 PG/ML (ref 15–65)
TSH SERPL DL<=0.005 MIU/L-ACNC: 4.89 UIU/ML (ref 0.3–4.2)
VIT B12 SERPL-MCNC: 550 PG/ML (ref 232–1245)

## 2023-01-26 PROCEDURE — 84630 ASSAY OF ZINC: CPT | Mod: 90

## 2023-01-26 PROCEDURE — 83970 ASSAY OF PARATHORMONE: CPT

## 2023-01-26 PROCEDURE — 80076 HEPATIC FUNCTION PANEL: CPT

## 2023-01-26 PROCEDURE — 82746 ASSAY OF FOLIC ACID SERUM: CPT

## 2023-01-26 PROCEDURE — 99000 SPECIMEN HANDLING OFFICE-LAB: CPT

## 2023-01-26 PROCEDURE — 82728 ASSAY OF FERRITIN: CPT

## 2023-01-26 PROCEDURE — 82306 VITAMIN D 25 HYDROXY: CPT

## 2023-01-26 PROCEDURE — 84425 ASSAY OF VITAMIN B-1: CPT | Mod: 90

## 2023-01-26 PROCEDURE — 99215 OFFICE O/P EST HI 40 MIN: CPT | Performed by: FAMILY MEDICINE

## 2023-01-26 PROCEDURE — 84443 ASSAY THYROID STIM HORMONE: CPT

## 2023-01-26 PROCEDURE — 36415 COLL VENOUS BLD VENIPUNCTURE: CPT

## 2023-01-26 PROCEDURE — 84590 ASSAY OF VITAMIN A: CPT | Mod: 90

## 2023-01-26 PROCEDURE — 82607 VITAMIN B-12: CPT

## 2023-01-26 NOTE — PATIENT INSTRUCTIONS
Welcome to [facility]!      We are grateful that you have chosen us to partner with you on your journey to better health!  We have included some very important information for you to read as you begin your journey towards weight loss surgery. We will discuss parts of this as you move closer to surgery but please reach out if you have any questions or concerns.      Please click on the following links and they will lead you to a printable version of each handout or copy into your browser to view/print at home:    Making Your Decision, Understanding Weight Loss Surgery  [email or web address]    A Roadmap to Your Weight Loss Surgery  [email or web address]    Honoring Choices - Your Rights  [email or web address]    Honoring Choices - [place] Health Care Directive (form that can be filled out)  [email or web address]      Insurance Coverage and Approval for Surgery  Every insurance plan is different.  Many companies approve benefits for surgery, but many have specific requirements that must be completed prior to being approved.    Verification of benefits is the patient's responsibility.  You will be responsible for any charges not covered by your insurance plan - including, but not limited to copays, deductible, out of pocket, any amount over your cap limit, etc.  Using the guideline below, please contact your insurance plan (we recommend you call the Customer Service number on the back of your card).      Once all of the requirements for surgery are complete, you will see the surgeon.  Following this visit, we will submit your information to your insurance plan for Prior Authorization.  We strongly encourage you to submit any documentation that supports your weight loss attempts to us.    Questions that are important to ask your insurance company when you call them:    Are [facility] in my network?  Is bariatric surgery  a covered benefit under my policy?  If so, what is my estimated out of pocket expense?  Are there any exclusions or cap limits on my plan for bariatric surgery?  If so, what are they?  What are the criteria necessary to be approved for bariatric surgery?  Do you require medically supervised weight loss visits for approval of surgery?  If yes, for how many months?  Within the last # of years?  Are the following procedures a covered benefit under my plan?  Laparoscopic Gastric Bypass (CPT Code 85133)  Laparoscopic Sleeve Gastrectomy (CPT Code 51679)  Nutrition/Dietitian Consult (CPT Code 35048  Nutrition/Dietitian Reassessment (CPT Code 62768  Psychological Assessment (CPT Codes 22890 & 17752      Initial labs for Bariatric Surgery    Some lab orders were placed by your doctor in the [facility] system and can be drawn at any of our outpatient hospital labs or your clinic. If you do them at one of three hospitals ([facility] in [place], [facility] in [place], [facility] in [place]) you do not need an appointment but if you'd like to do them at a clinic, you will need to schedule an appointment with that clinic.       You will need to be fasting 10-12 hours prior (you can continue to drink water) and should have them drawn sooner verses later so if any corrections need to be made we will have time to address them.      [facility] lab is open 7 am - 4:30 pm Monday through Friday and 9am-12:30 pm on Saturdays.  [facility]'s lab is open 7 am -4:30 pm Monday through Friday and 8am to 11:30am on Saturday and Sundays.     If you would like them done at a clinic outside the [facility] system, then we will need to know where to fax the orders.   If you have any questions or would like help scheduling a lab appointment at the [facility], please give us a call on the Bariatric Nurse Line at [phone].        Rimini to Success for Bariatric Surgery  Eat 3 nutrient-rich meals each day      Don't skip meals--it will cause you to overeat later in the day! Space meals 4-6 hours apart    Eat around the same times each day to develop a routine eating schedule    Avoid snacking unless physically hungry.   Planned snacks: 1-2 times per day and no more than 150 calories    Eating protein and fiber (vegetables/fruits/whole grains) with meals helps you stay full     Choose foods with less than 10 grams of sugar and 5-10 grams of fat per serving to prevent excess calories and weight gain  Eat protein first    Protein helps with healing, maintaining muscle mass and good nutrition, and reducing hunger     For RNY Gastric Bypass, Sleeve Gastrectomy, and Duodenal Switch: aim for 60-80 grams of protein per day    Eat protein first, then veggies and the fruits or grains  Stop drinking 15-30 minutes before meals and wait 30-45 minutes after eating to drink    Drinking too soon before a meal may cause you to be too full to eat    Drinking too soon after you eat will cause the food to  wash  through your new stomach too quickly--leaving you hungry and likely to eat more    Eat S-L-O-W-[id number]-Y    Take 20-30 minutes to eat each meal by taking small bites, chewing foods to applesauce consistency or 20-30 times before you swallow    Not chewing food properly can block the opening of your pouch--causing pain, nausea, vomiting    Eating foods too fast can delay those full signals and increase overeating   Slow down your eating by smaller plates/bowls, toddler utensils, putting your fork/spoon down between bites and not watching TV/computer during meals!  Make a list of activities to prevent boredom, stress and emotional eating    Go for a walk or lift weights while watching your favorite TV show    Talk to a co-worker or email/call a friend     Keep your hands and mind busy with woodworking, puzzles, knitting or painting your nails    Practice deep breathing or meditation  Drink 64 ounces of 0-Calorie drinks between meals      Water    Zero calorie Propel , Vitamin Water Zero  or SoBe Lifewater , Crystal Light , Sugar-Free David-Aid , and other sugar-free lemonade or flavored chanel    Decaffeinated, unsweetened coffee/ tea (1 cup or less per day)   Avoid Caffeine and Carbonation- NO STRAWS    Caffeine can irritate your new pouch, increase risk for ulcers, and can increase your appetite      Carbonation can lead to increased bloating/gas and discomfort   Work up to a total of 30-60 minutes of physical activity most days of the week    Helps with losing weight and preventing weight regain after surgery     Do a combination of cardio (walking/water exercises/biking/swimming) and strength training  Take daily vitamin/mineral supplements after surgery    Daily use of vitamins/minerals is necessary for the rest of your life      Psychological Evaluation for Bariatric Surgery      Why do I need a psychological evaluation before bariatric surgery?     The psychologist's main purpose is to provide the support and education to ensure you have the most successful outcome after surgery.   Preparing for bariatric surgery often involves changing behavior patterns. Working on these changes before surgery allows you to achieve the best results after surgery as some of these behaviors have been entrenched for many years. In addition, your relationship with food may need to change. Psychologists are experts in behavior change and are there to guide and support you as you make these important changes.   A significant life change, like losing a lot of weight, may affect many areas of your life--self-concept, physical activity and relationships with others. Your psychologist will help you prepare to adjust to these changes in a healthy way.   If you have mental health symptoms, relationship struggles, or other challenges, your psychologist will suggest how to best address these concerns so that they do not interfere with your progress toward a healthier  lifestyle.       Is the psychologist looking for reasons to say I can't have surgery?   The goal is to not find specific psychological problems. Instead, the psychologist will be working with your strengths to ensure you have the most optimal outcome after surgery.  There is no expectation that you will have everything figured out already or that you must have  perfect  behaviors before moving forward with surgery. Your psychologist is expecting that you will have some behavior changes that will need to occur concurrent with the surgery.   You can feel comfortable that the psychologist is not there to    you or your habits. The psychologist is there to provide support and encourage your progress.      What should I expect during the evaluation?   During the interview, which could take place over 2 or more sessions, the psychologist will ask about:   -weight history, current eating pattern and fluid intake, and previous attempts at weight loss   -activity level   -psychiatric history  -drug, alcohol and nicotine use   -social and developmental history  -support system   -current stressors and coping mechanisms   -understanding of the risks of surgery   -expectations for outcomes after surgery   You will complete various questionnaires that will focus on coping strategies, eating behaviors, quality of life and adverse childhood experiences in order to provide additional information to inform the assessment.   Your psychologist will likely give you some  homework assignments  to practice as you prepare for surgery. This will be individually tailored to your specific needs.   Your psychologist will talk with you about some of the typical challenges that patients might encounter after surgery and how to prepare for these.   A final report will be generated and any treatment recommendations will be discussed with you and the bariatric team to determine next steps.   If you would like, you may have any support people  (e.g., spouse) join a session of the evaluation to provide additional information.       Bariatric surgery offers a physical  tool  for weight management. Similarly, your work with the psychologist will provide you with some additional emotional and behavioral  tools  as you work toward your healthier lifestyle goals.      Support Group    Support and accountability is an important part of your weight loss journey and maintenance once you reach your health goals.  Because of this, we require that you attend at least one of our support groups before you meet with the surgeon so you get a feel for what they are like and hopefully establish a connection to others who are going through a similar process or have had surgery before so you can ask your questions.    We currently have two options for support group but they are in the virtual format only at this time.  Both groups are using Microsoft Teams for their platform and you can access it through the web or an morelia that can be downloaded to a smart phone if you have one.      The Pre- and Post-op Connections Group is on the second Tuesday of the month from 6:30-8pm and is hosted by Ivan Terry, PhD.  If you are interested in this group, you will need to email him at [email or web address] each month and then he will in turn send you the invitation to join.      We also have a Post-op focused Connections Group the 4th Wednesday of the month from 11am-12pm that is mostly geared toward post-operative patients who are past three months post-op.  This group is hosted by DANNI Pathak, CBN, CIC and you can email her to join the group at [email or web address] each month and she will send you an invite similar to Ivan's group.  If you decide you would like to be a regular attender at this group, we can add you to an automatic invitation list of people.    You can see more information about available support groups that the [facility] offers to our  patients as well by following the link:    The following Support Group information can also be found on our website:  [email or web address]      Please let us know if you have any questions about all the information above and we will be talking more about this during future visits.     Thank you,   [facility] Bariatric Team       Bariatric Task List    Fax:  Please fax all paperwork to: [phone] -     Status:  Is patient a candidate for bariatric surgery?:  patient is a candidate for bariatric surgery -     Cleared to schedule surgeon consult?:  not cleared to schedule surgeon consult -     Status:  surgery evaluation in process -     Surgeon: Bob -        Insurance: Insurance:  [employer] -      Contact insurance to discuss coverage: Needed -       Other:  Please call Areli Walls at [phone] -        Patient Info: Initial Weight:  339 lbs -     Date of Initial Weight/Height:  1/26/2023 -     Required Weight Loss:  no weight gain -     Surgery Type:  undecided -        Dietician Visits: Clearance from dietician to see surgeon?:  Needed -     Dietician Notes:    -        Psychological Evaluation: Psych eval:  Needed -     Other:    -        Lab Work: Complete Blood Count:  Needed -     Comprehensive Metabolic Panel:  Needed - Hepatic profile only - BMP done   Vitamin D:  Needed -     PTH:  Needed -     Hgb A1c:  Completed -      Lipids: Completed -      TSH (SRIDHAR, SCA, [place] MA): Needed -       Ferritin: Needed -       Folate: Needed -     Thiamine: Needed -     Vitamin A: Needed -     Vitamin B12: Needed -     Zinc: Needed -     Other:    -        Consults/ Clearance Sleep Medicine:  Completed - bring CPAP to hospital with you      Testing: Sleep Study: Completed - CPAP user      PCP: Establish care with PCP:  Completed -        Stopping Smoking/ Alcohol Use: Quit tobacco use (3 months smoke free)?:    -  Completed -    Quit date:    -   "1/1/1980      Patient Education:  Information Session:  Completed -     Given \"Making your decision\" handout?:  Yes -     Given \"A Roadmap to you Weight Loss Surgery\" handout?: Yes -     Attended support group?:  Needed - must attend at least once.   Support plan in place?:  Completed -        Additional Surgery Requirements: Review Coag plan:  Completed - standard   Gallstone prevention plan (Actigall for 6 months postop): Needed -     Other:   -     Other:   -        Final Tasks:  Before surgery online class:  Needed - 3 weeks prior with RD and RN   After surgery online class:  Needed - 1 week after with RD   History and Physical per clinic:   -  Needed -  within one month of surgery once scheduled   Final labs per clinic: Needed -  within one month of surgery once scheduled   Chest xray per clinic:   -  Needed -  within one month of surgery once scheduled   Electrocardiogram (ECG) per clinic:   -  Needed -  within one month of surgery once scheduled   Other:   -        Notes:   -            "
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
## 2023-01-28 LAB — ZINC SERPL-MCNC: 75.8 UG/DL

## 2023-01-29 LAB
ANNOTATION COMMENT IMP: NORMAL
RETINYL PALMITATE SERPL-MCNC: 0.03 MG/L
VIT A SERPL-MCNC: 0.71 MG/L

## 2023-02-01 LAB — VIT B1 PYROPHOSHATE BLD-SCNC: 128 NMOL/L

## 2023-02-22 ENCOUNTER — VIRTUAL VISIT (OUTPATIENT)
Dept: SURGERY | Facility: CLINIC | Age: 63
End: 2023-02-22
Payer: COMMERCIAL

## 2023-02-22 DIAGNOSIS — E66.01 MORBID OBESITY WITH BMI OF 50.0-59.9, ADULT (H): Primary | ICD-10-CM

## 2023-02-22 PROCEDURE — 97802 MEDICAL NUTRITION INDIV IN: CPT | Mod: VID

## 2023-02-22 NOTE — PATIENT INSTRUCTIONS
Preparing for Weight Loss Surgery    Lifestyle Changes Before and After Weight Loss Surgery: [place] for Success  [email or web address]     Portion Control Without Measuring  [email or web address]     Create a Plate (How to Build A Healthy Meal)  [email or web address]    Protein Sources for Weight Loss  [email or web address]     Protein Supplements  *Avoid protein supplements with caffeine for first 3 months post op     Type Serving Calories Protein Grams Sugar Grams Where Available   Muscle Milk Pro Series Shake 1 bottle  170 32 1 [employer], Target   Premier Protein Shake 1 bottle 160 30 1 Don's/[employer]co  [employer], Target, Cub   Equate High Performance Shake 1 bottle  160 30 1 [employer]   [facility] Nutrition Plan Shake 1 bottle 150 30 2 [employer], [employer]   Ensure Max Protein  Shake 1 bottle 150 30 1 [employer], Target   [facility] Core Power Shake 1 bottle 170 26 5 [employer]   Pure Protein Shake 1 bottle 110-170 23 1  [employer], [employer], Target   Slim Fast   High Protein Shake 1 bottle 180 20 1 [employer], Target, Grocery/Pharmacy   100kcal Muscle Milk Shake 1 bottle 100 20 0 [employer], Target, Wal[facility]reens,Grocery/Pharmacy   Hermes's Lift Water 1 bottle 90 20 0 [employer], Target   Isopure Zero Carb Water   bottle 80 20 0 [employer], Vitamin Shoppe, online   Premier Protein Clear Water 1 bottle 90 20 0 [employer]    Unjury Protein+ Powder 1 scoop 90 20 0 www.Curious Hat  wwwWondershare Software   Advantage Shake  1 bottle 160 15-17 1 [employer], Target  Grocery/Pharmacy   100kcal Muscle Milk Powder 1 scoop 100 15 0 [employer], musclemilk.com   Protein 2-0 Water 1 bottle 60-70 15 0 [facility]         Plant-Based Protein Supplements     Type Serving  Calories Protein   Grams Sugar Grams  Where Available    Garden of Life Raw Protein Powder Powder 1 scoop 110 22 0 Whole Foods, Vitamin Shoppe, wwwWondershare Software   Orgain Organic Protein Powder Powder 2 scoops 150 21 0 Target, [employer], [employer]co   Planted by Unjury Powder 1  scoop 130 20 3 www.[employer]ar [employer].com  www.amazon.com   Protein and Greens by Afith Powder  1 scoop 120 20 0 [employer], Target, Grocery/Pharmacy   Essentials Shake by Faith Powder 1 scoop 130 20 0 [employer], Target, Grocery/Pharmacy     Plant Fusion Orgain Plant Protein Powder 1 scoop 120 20 0 www.plantfusion.Acarix   Orgain Grass Fed Shake Shake 1 bottle 140 20 4 Target, [employer]   Evolve Shake 1 bottle 150 20 4 Target, [place], Wal[employer]Mart   Sun Warrior Powder 1 scoop 100 17-18 0 [employer]NC, Whole Foods   Whole Foods Fit Protein  Powder 1 scoop 110 17 0 Whole Foods   Garden of Life Plant Protein  Powder 1 scoop 90 15 0 Whole Foods, Vitamin Shoppe,   www.amazon.com     Look for (per serving):  -100-200 calories  -15-30 grams protein   -Less than 10 grams total carbohydrate  -10/10 Rule

## 2023-02-22 NOTE — PROGRESS NOTES
Ludmila Sanchez is a 62 year old who is being evaluated via a billable video visit.      How would you like to obtain your AVS? MyChart  If the video visit is dropped, the invitation should be resent by: Text to cell phone: [phone]  Will anyone else be joining your video visit? No        Initial Structured Weight Loss Supervised Diet Evaluation     Assessment:  Ludmila is being seen today for initial RD nutritional evaluation. Patient has been unsuccessful with non-surgical weight loss methods and is interested in bariatric surgery. Today we reviewed current eating habits and level of physical activity, and instructed on the changes that are required for successful bariatric outcomes.    Surgery of interest per pt: Undecided at this time.    Workflow review:  Support Group: Not completed.  Psychology:Not completed.  Lab work:Completed.  SWL:No       Weight goal: At or below initial.    Anthropometrics:  Initial weight: 339 lbs    BMI: There is no height or weight on file to calculate BMI.   Ideal body weight: 57 kg (125 lb 10.6 oz)  Adjusted ideal body weight: 95.7 kg (211 lb)  Estimated RMR (Milroy-St Jeor equation):  2100 kcals x 1.2 (sedentary) = 2500 kcals (for weight maintenance)      Medical History:  Patient Active Problem List   Diagnosis     CARDIOVASCULAR SCREENING; LDL GOAL LESS THAN 160     Mild major depression (H)     Vitamin D deficiency     Vitamin B12 deficiency without anemia     Colonic polyp     Obesity     Cerebral cavernoma     Advanced directives, counseling/discussion     TEODORO (obstructive sleep apnea)     Morbid obesity due to excess calories (H)     Bipolar affective disorder in remission (H)     History of hypertension     Hyperlipidemia LDL goal <100     S/P TKR (total knee replacement)     Pruritus     Acute blood loss anemia     Adenomatous polyp of colon, unspecified part of colon      Diabetes: No  HgbA1C: 5.9 (11/01/22) prediabetic     Biggest struggle with weight loss: Does not  like to cook, lives alone. Has a difficult time with portion control.    Nutrition History  Food allergies/intolerances/cultural or religous food customs: No     Diet history: Took Saxenda in the past and did well. Felt the changed in work shift caused medication to no longer work. States she needs a menu to follow and a routine.     Vitamins/Mineral currently taking: MVI, B12, Vit D,     Socioeconomic Status  Who does the grocery shopping for your household? Self  Where do you grocery shop?: [place], [employer], [employer], [employer], [employer]  Utilizing food bank, food stamps, and/or meal delivery program(s)?: No   Who prepares your meals at home? Self    Diet Recall/Time    Breakfast 9am: Eggs and sausage OR pancakes Or oatmeal  Lunch 12-1:00pm: Frozen TV dinner Or [place]   Dinner: [place]  (meat and cheese)    Typical Snacks: Grapefruit Or DOTS pretzels     Overnight eating: No    Eating out: 2-3x per week    Beverages  Coffee with regular creamer, 20 oz Coke at work, Water (x3 24-32 oz cups)    Exercise  No routine at this time.    Nutrition Diagnosis (PES statement)  (NI-1.3) Excessive energy intake related to Food and nutrition related knowledge deficit concerning excessive energy/oral intake as evidenced by Intake of high caloric density foods/beverages (juice, soda, alcohol) at meals and/or snacks; large portions; frequent grazing; Estimated intake that exceeds estimated daily energy intake; Binge eating patterns; Frequent excessive fast food or restaurant intake; and BMI 56.4 kg/m2.     Intervention    Nutrition Education:   1. Provided general overview of diet and lifestyle modifications needed to be a deemed a safe candidate for bariatric surgery.   2. Educated patient on how to read a food label: choosing foods with than 10 grams fat and 10 grams sugar per serving to avoid dumping syndrome.  3. Dumping Syndrome: Described the mechanisms of syndrome, symptoms, and prevention tools from a dietary perspective.    4. Vitamins: Educated on post-op vitamin regimen including MVI+ 18 mg Fe two times a day, calcium citrate 400-600 mg two times a day, 5869-7518 mcg sublingual B12 daily, 5000 IU vitamin D3 daily.     Food/Nutrient Delivery:  5. Educated patient on eating three meals, with cutting out snacking.  6. Bariatric Plate: Patient and I discussed the importance of including a lean protein source (20-30 grams/meal), vegetables (included at lunch and dinner), one serving (15g) of carbohydrate, and limited added fat (1 tb/day) at each meal.   7. Educated patient on how to complete a food journal and benefits of meal planning.   8. Educated patient on using a protein powder drink as a meal replacement and/or supplement after bariatric surgery.   9. Discussed importance of adequate hydration after surgery, with goal of at least 64 oz of fluids/day.  10. Addressed avoiding all carbonated, caffeinated and sweetened drinks to prepare for bariatric surgery.     Nutrition Counselin. Mindful eating techniques: Encouraged slow meal pace, chewing foods to applesauce consistency for 20-30 minutes/meal.   12. Discussed  fluids 30 minutes before, during, and after meal to prevent dumping syndrome and discomfort post bariatric surgery.   13. Discussed pre/post operative diet progression, post op vitamin regimen, gave review of surgery process.     Instructions/Goals:     1. Start implementing bariatric surgery lifestyle modifications.  2. Fill out food journal and return at follow up.    Handouts Provided:   [facility] Bariatric Surgery Nutrition Info  Protein supplement list      Monitor/Evaluation:  Pt. s target weight: no gain from initial visit, patient verbalized understanding.     Plan for next visit:     (Final Supervised Diet visit with RD) pre/post-op  diet progression, give review of surgery process.      Video-Visit Details    Type of service:  Video Visit    Video Start Time (time video started): 9:30  am    Video End Time (time video stopped): 10:17 am    Originating Location (pt. Location): Home        Distant Location (provider location):  Off-site    Mode of Communication:  Video Conference via [facility]    Physician has received verbal consent for a Video Visit from the patient? Yes      Allyn Parsons RD

## 2023-02-22 NOTE — LETTER
2/22/2023         RE: Ludmial Sanchez  [address]  [place] [address]        Dear Colleague,    Thank you for referring your patient, Ludmila Sanchez, to the [facility] AND [facility] [place]. Please see a copy of my visit note below.    Ludmila Sanchez is a 62 year old who is being evaluated via a billable video visit.      How would you like to obtain your AVS? MyChart  If the video visit is dropped, the invitation should be resent by: Text to cell phone: [phone]  Will anyone else be joining your video visit? No        Initial Structured Weight Loss Supervised Diet Evaluation     Assessment:  Ludmila is being seen today for initial RD nutritional evaluation. Patient has been unsuccessful with non-surgical weight loss methods and is interested in bariatric surgery. Today we reviewed current eating habits and level of physical activity, and instructed on the changes that are required for successful bariatric outcomes.    Surgery of interest per pt: Undecided at this time.    Workflow review:  Support Group: Not completed.  Psychology:Not completed.  Lab work:Completed.  SWL:No       Weight goal: At or below initial.    Anthropometrics:  Initial weight: 339 lbs    BMI: There is no height or weight on file to calculate BMI.   Ideal body weight: 57 kg (125 lb 10.6 oz)  Adjusted ideal body weight: 95.7 kg (211 lb)  Estimated RMR (Melcroft-St Jeor equation):  2100 kcals x 1.2 (sedentary) = 2500 kcals (for weight maintenance)      Medical History:  Patient Active Problem List   Diagnosis     CARDIOVASCULAR SCREENING; LDL GOAL LESS THAN 160     Mild major depression (H)     Vitamin D deficiency     Vitamin B12 deficiency without anemia     Colonic polyp     Obesity     Cerebral cavernoma     Advanced directives, counseling/discussion     TEODORO (obstructive sleep apnea)     Morbid obesity due to excess calories (H)     Bipolar affective disorder in remission (H)     History of  hypertension     Hyperlipidemia LDL goal <100     S/P TKR (total knee replacement)     Pruritus     Acute blood loss anemia     Adenomatous polyp of colon, unspecified part of colon      Diabetes: No  HgbA1C: 5.9 (11/01/22) prediabetic     Biggest struggle with weight loss: Does not like to cook, lives alone. Has a difficult time with portion control.    Nutrition History  Food allergies/intolerances/cultural or religous food customs: No     Diet history: Took Saxenda in the past and did well. Felt the changed in work shift caused medication to no longer work. States she needs a menu to follow and a routine.     Vitamins/Mineral currently taking: MVI, B12, Vit D,     Socioeconomic Status  Who does the grocery shopping for your household? Self  Where do you grocery shop?: [place], [employer], [employer], [employer], [employer]  Utilizing food bank, food stamps, and/or meal delivery program(s)?: No   Who prepares your meals at home? Self    Diet Recall/Time    Breakfast 9am: Eggs and sausage OR pancakes Or oatmeal  Lunch 12-1:00pm: Frozen TV dinner Or [employer]   Dinner: [employer]  (meat and cheese)    Typical Snacks: Grapefruit Or [employer] pretzels     Overnight eating: No    Eating out: 2-3x per week    Beverages  Coffee with regular creamer, 20 oz Coke at work, Water (x3 24-32 oz cups)    Exercise  No routine at this time.    Nutrition Diagnosis (PES statement)  (NI-1.3) Excessive energy intake related to Food and nutrition related knowledge deficit concerning excessive energy/oral intake as evidenced by Intake of high caloric density foods/beverages (juice, soda, alcohol) at meals and/or snacks; large portions; frequent grazing; Estimated intake that exceeds estimated daily energy intake; Binge eating patterns; Frequent excessive fast food or restaurant intake; and BMI 56.4 kg/m2.     Intervention    Nutrition Education:   1. Provided general overview of diet and lifestyle modifications needed to be a deemed a safe candidate for  bariatric surgery.   2. Educated patient on how to read a food label: choosing foods with than 10 grams fat and 10 grams sugar per serving to avoid dumping syndrome.  3. Dumping Syndrome: Described the mechanisms of syndrome, symptoms, and prevention tools from a dietary perspective.   4. Vitamins: Educated on post-op vitamin regimen including MVI+ 18 mg Fe two times a day, calcium citrate 400-600 mg two times a day, 8999-4399 mcg sublingual B12 daily, 5000 IU vitamin D3 daily.     Food/Nutrient Delivery:  5. Educated patient on eating three meals, with cutting out snacking.  6. Bariatric Plate: Patient and I discussed the importance of including a lean protein source (20-30 grams/meal), vegetables (included at lunch and dinner), one serving (15g) of carbohydrate, and limited added fat (1 tb/day) at each meal.   7. Educated patient on how to complete a food journal and benefits of meal planning.   8. Educated patient on using a protein powder drink as a meal replacement and/or supplement after bariatric surgery.   9. Discussed importance of adequate hydration after surgery, with goal of at least 64 oz of fluids/day.  10. Addressed avoiding all carbonated, caffeinated and sweetened drinks to prepare for bariatric surgery.     Nutrition Counselin. Mindful eating techniques: Encouraged slow meal pace, chewing foods to applesauce consistency for 20-30 minutes/meal.   12. Discussed  fluids 30 minutes before, during, and after meal to prevent dumping syndrome and discomfort post bariatric surgery.   13. Discussed pre/post operative diet progression, post op vitamin regimen, gave review of surgery process.     Instructions/Goals:     1. Start implementing bariatric surgery lifestyle modifications.  2. Fill out food journal and return at follow up.    Handouts Provided:   [facility] Bariatric Surgery Nutrition Info  Protein supplement list      Monitor/Evaluation:  Pt. s target weight: no gain from  initial visit, patient verbalized understanding.     Plan for next visit:     (Final Supervised Diet visit with RD) pre/post-op  diet progression, give review of surgery process.      Video-Visit Details    Type of service:  Video Visit    Video Start Time (time video started): 9:30 am    Video End Time (time video stopped): 10:17 am    Originating Location (pt. Location): Home        Distant Location (provider location):  Off-site    Mode of Communication:  Video Conference via [facility]    Physician has received verbal consent for a Video Visit from the patient? Yes      Allyn Parsons RD          Again, thank you for allowing me to participate in the care of your patient.        Sincerely,        Allyn Parsons RD

## 2023-03-01 ENCOUNTER — VIRTUAL VISIT (OUTPATIENT)
Dept: SURGERY | Facility: CLINIC | Age: 63
End: 2023-03-01
Payer: COMMERCIAL

## 2023-03-01 DIAGNOSIS — E66.01 MORBID OBESITY DUE TO EXCESS CALORIES (H): Primary | ICD-10-CM

## 2023-03-01 NOTE — PROGRESS NOTES
Video-Visit Details    Type of service:  Video Visit    Video Start Time (time video started): 8:25 AM    Video End Time (time video stopped): 9:15 AM    Originating Location (pt. Location): Home    Distant Location (provider location):  Off-site    Mode of Communication:  Video Conference via [employer]    Ludmila currently does not want to follow through with surgery, but was referred by her PCP. She has struggled with her weight for her adult life and now is concerned about various comorbidities. She has a history of depression and Bipolar Disorder and is currently medicated with Zoloft. She is also a stress, emotional and boredom eater. She has good knowledge of surgery and good support. She will follow up and complete psychological testing. F32.9; h/o F31.9; E66.01

## 2023-03-09 ENCOUNTER — MYC MEDICAL ADVICE (OUTPATIENT)
Dept: SURGERY | Facility: CLINIC | Age: 63
End: 2023-03-09
Payer: COMMERCIAL

## 2023-03-09 DIAGNOSIS — E66.01 MORBID OBESITY DUE TO EXCESS CALORIES (H): ICD-10-CM

## 2023-03-09 DIAGNOSIS — R79.89 ELEVATED TSH: Primary | ICD-10-CM

## 2023-03-09 NOTE — TELEPHONE ENCOUNTER
Georgia Marquez MD sent to  [facility]  PTH, thyroid peroxidase antibody, anti thyroglobulin antibody in 4-6 wks. Thanks!

## 2023-03-13 ENCOUNTER — OFFICE VISIT (OUTPATIENT)
Dept: ORTHOPEDICS | Facility: CLINIC | Age: 63
End: 2023-03-13
Payer: COMMERCIAL

## 2023-03-13 ENCOUNTER — ANCILLARY PROCEDURE (OUTPATIENT)
Dept: GENERAL RADIOLOGY | Facility: CLINIC | Age: 63
End: 2023-03-13
Attending: PEDIATRICS
Payer: COMMERCIAL

## 2023-03-13 VITALS — BODY MASS INDEX: 48.82 KG/M2 | HEIGHT: 65 IN | OXYGEN SATURATION: 99 % | WEIGHT: 293 LBS | HEART RATE: 98 BPM

## 2023-03-13 DIAGNOSIS — M79.672 LEFT FOOT PAIN: ICD-10-CM

## 2023-03-13 DIAGNOSIS — M79.672 PAIN OF LEFT HEEL: Primary | ICD-10-CM

## 2023-03-13 PROCEDURE — 73630 X-RAY EXAM OF FOOT: CPT | Mod: TC | Performed by: RADIOLOGY

## 2023-03-13 PROCEDURE — 99203 OFFICE O/P NEW LOW 30 MIN: CPT | Performed by: PEDIATRICS

## 2023-03-13 NOTE — LETTER
3/13/2023         RE: Ludmila Sanchez  [address]  [place] [address]        Dear Colleague,    Thank you for referring your patient, Ludmila Sanchez, to the [facility]. Please see a copy of my visit note below.    ASSESSMENT & PLAN    Ludmila was seen today for pain.    Diagnoses and all orders for this visit:    Pain of left heel  -     Ankle/Foot Bracing Supplies DME Walking Boot; Left; Pneumatic; Short    Left foot pain  -     XR Foot LT G/E 3 vw; Future  -     Ankle/Foot Bracing Supplies DME Walking Boot; Left; Pneumatic; Short            See Patient Instructions  Patient Instructions   Reviewed nature of the left heel pain.  Some components fit with plantar fasciitis, given pain increased after periods of inactivity and then getting moving again.  However, location of tenderness and pain is a bit more posterior than oftentimes seen with plantar fasciitis, and the x-rays today demonstrate small calcification at the plantar aspect of the calcaneus.  Symptoms overall fit better with fat pad syndrome, though there can be overlap with each of these.  Reviewed symptom management, activity modification, options with imaging, support, also footwear.  Briefly also discussed rehab approach, as well as potential for injection or procedure if ongoing symptoms.  No additional imaging required for now.  We discussed in particular trying silicone gel cups and/for cam walker for support.  Options reviewed today, and you may obtain on your own if desired.  We discussed avoiding going barefoot on firm surfaces, as you already do.  Home exercises reviewed today, working on stretching.  Plan to monitor 4 to 6 weeks with above to start.    If you have any further questions for your physician or physician s care team you can contact them thru [employer] or by calling  [phone] and use option 3 to leave a voice message.   Messages received during business hours will be returned same  "day.          DO ANDRÉS Castillo [facility]    -----  Chief Complaint   Patient presents with     Left Foot - Pain       SUBJECTIVE  Ludmila Sanchez is a/an 62 year old female who is seen as a self referral for evaluation of left foot pain.     The patient is seen by themselves.    Onset: 1 month(s) ago. Reports insidious onset without acute precipitating event.  Location of Pain: bottom and back of the heel   Worsened by: standing, walking, WB   Treatments tried: Tylenol and Aspercreme, rollers,   Associated symptoms: sharp pain     Orthopedic/Surgical history: NO  Social History/Occupation: [employer], standing and sitting job     **  History plantar fasciitis on right.  Has tried doing some self massage on left, also acetaminophen 1000 mg prior to work. Also using topical cream on area.  For work, is in production, has to sit and stand.  Notes pain is generally more after periods of inactivity. However, still gets pain while up and moving.        REVIEW OF SYSTEMS:  Review of Systems      OBJECTIVE:  Pulse 98   Ht 1.651 m (5' 5\")   Wt (!) 153.8 kg (339 lb)   LMP 11/01/2011   SpO2 99%   BMI 56.41 kg/m           Left Ankle/foot Exam:    Inspection:       no visible ecchymosis       no visible edema or effusion    ROM:   Grossly intact at ankle  No change midfoot, hindfoot motion    Strength:  No change with resisted ankle motion    Tender:  Plantar heel, more directly inferior to calcaneus, mild median calcaneal eminence  Mild posterior heel    Skin:       well perfused       capillary refill brisk          RADIOLOGY:  I independently ordered, visualized and reviewed these images with the patient  Plantar calcaneal spurring. Dorsal midfoot spurring.     XR Foot LT G/E 3 vw    Narrative    FOOT THREE VIEWS LEFT  3/13/2023 10:05 AM     HISTORY: Left foot pain  COMPARISON: None.      Impression    IMPRESSION: No acute fracture or malalignment. Moderate to " severe  degenerative changes of the naviculocuneiform and TMT joints with  joint space narrowing and osteophytosis. Moderate plantar calcaneal  enthesophyte. Bipartite medial hallux sesamoid.    CAROLYN WALSH MD         SYSTEM ID:  [id number]                      Again, thank you for allowing me to participate in the care of your patient.        Sincerely,        Josh Coffey, DO

## 2023-03-13 NOTE — PROGRESS NOTES
ASSESSMENT & PLAN    Ludmila was seen today for pain.    Diagnoses and all orders for this visit:    Pain of left heel  -     Ankle/Foot Bracing Supplies DME Walking Boot; Left; Pneumatic; Short    Left foot pain  -     XR Foot LT G/E 3 vw; Future  -     Ankle/Foot Bracing Supplies DME Walking Boot; Left; Pneumatic; Short            See Patient Instructions  Patient Instructions   Reviewed nature of the left heel pain.  Some components fit with plantar fasciitis, given pain increased after periods of inactivity and then getting moving again.  However, location of tenderness and pain is a bit more posterior than oftentimes seen with plantar fasciitis, and the x-rays today demonstrate small calcification at the plantar aspect of the calcaneus.  Symptoms overall fit better with fat pad syndrome, though there can be overlap with each of these.  Reviewed symptom management, activity modification, options with imaging, support, also footwear.  Briefly also discussed rehab approach, as well as potential for injection or procedure if ongoing symptoms.  No additional imaging required for now.  We discussed in particular trying silicone gel cups and/for cam walker for support.  Options reviewed today, and you may obtain on your own if desired.  We discussed avoiding going barefoot on firm surfaces, as you already do.  Home exercises reviewed today, working on stretching.  Plan to monitor 4 to 6 weeks with above to start.    If you have any further questions for your physician or physician s care team you can contact them thru MyChart or by calling  [phone] and use option 3 to leave a voice message.   Messages received during business hours will be returned same day.          DO ANDRÉS Castillo [facility]    -----  Chief Complaint   Patient presents with     Left Foot - Pain       SUBJECTIVE  Ludmila Sanchez is a/an 62 year old female who is seen as a self referral for evaluation of  "left foot pain.     The patient is seen by themselves.    Onset: 1 month(s) ago. Reports insidious onset without acute precipitating event.  Location of Pain: bottom and back of the heel   Worsened by: standing, walking, WB   Treatments tried: Tylenol and Aspercreme, rollers,   Associated symptoms: sharp pain     Orthopedic/Surgical history: NO  Social History/Occupation: [employer], standing and sitting job     **  History plantar fasciitis on right.  Has tried doing some self massage on left, also acetaminophen 1000 mg prior to work. Also using topical cream on area.  For work, is in production, has to sit and stand.  Notes pain is generally more after periods of inactivity. However, still gets pain while up and moving.        REVIEW OF SYSTEMS:  Review of Systems      OBJECTIVE:  Pulse 98   Ht 1.651 m (5' 5\")   Wt (!) 153.8 kg (339 lb)   LMP 11/01/2011   SpO2 99%   BMI 56.41 kg/m           Left Ankle/foot Exam:    Inspection:       no visible ecchymosis       no visible edema or effusion    ROM:   Grossly intact at ankle  No change midfoot, hindfoot motion    Strength:  No change with resisted ankle motion    Tender:  Plantar heel, more directly inferior to calcaneus, mild median calcaneal eminence  Mild posterior heel    Skin:       well perfused       capillary refill brisk          RADIOLOGY:  I independently ordered, visualized and reviewed these images with the patient  Plantar calcaneal spurring. Dorsal midfoot spurring.     XR Foot LT G/E 3 vw    Narrative    FOOT THREE VIEWS LEFT  3/13/2023 10:05 AM     HISTORY: Left foot pain  COMPARISON: None.      Impression    IMPRESSION: No acute fracture or malalignment. Moderate to severe  degenerative changes of the naviculocuneiform and TMT joints with  joint space narrowing and osteophytosis. Moderate plantar calcaneal  enthesophyte. Bipartite medial hallux sesamoid.    CAROLYN WALSH MD         SYSTEM ID:  [id number]                  "

## 2023-03-13 NOTE — PATIENT INSTRUCTIONS
Reviewed nature of the left heel pain.  Some components fit with plantar fasciitis, given pain increased after periods of inactivity and then getting moving again.  However, location of tenderness and pain is a bit more posterior than oftentimes seen with plantar fasciitis, and the x-rays today demonstrate small calcification at the plantar aspect of the calcaneus.  Symptoms overall fit better with fat pad syndrome, though there can be overlap with each of these.  Reviewed symptom management, activity modification, options with imaging, support, also footwear.  Briefly also discussed rehab approach, as well as potential for injection or procedure if ongoing symptoms.  No additional imaging required for now.  We discussed in particular trying silicone gel cups and/for cam walker for support.  Options reviewed today, and you may obtain on your own if desired.  We discussed avoiding going barefoot on firm surfaces, as you already do.  Home exercises reviewed today, working on stretching.  Plan to monitor 4 to 6 weeks with above to start.    If you have any further questions for your physician or physician s care team you can contact them thru [employer] or by calling  [phone] and use option 3 to leave a voice message.   Messages received during business hours will be returned same day.

## 2023-03-14 ENCOUNTER — LAB (OUTPATIENT)
Dept: LAB | Facility: CLINIC | Age: 63
End: 2023-03-14
Payer: COMMERCIAL

## 2023-03-14 DIAGNOSIS — E66.01 MORBID OBESITY DUE TO EXCESS CALORIES (H): ICD-10-CM

## 2023-03-14 DIAGNOSIS — R79.89 ABNORMAL TSH: ICD-10-CM

## 2023-03-14 DIAGNOSIS — R79.89 ELEVATED TSH: ICD-10-CM

## 2023-03-14 LAB
PTH-INTACT SERPL-MCNC: 43 PG/ML (ref 15–65)
THYROGLOB AB SERPL IA-ACNC: <20 IU/ML
THYROPEROXIDASE AB SERPL-ACNC: <10 IU/ML

## 2023-03-14 PROCEDURE — 84443 ASSAY THYROID STIM HORMONE: CPT

## 2023-03-14 PROCEDURE — 86376 MICROSOMAL ANTIBODY EACH: CPT

## 2023-03-14 PROCEDURE — 86800 THYROGLOBULIN ANTIBODY: CPT

## 2023-03-14 PROCEDURE — 36415 COLL VENOUS BLD VENIPUNCTURE: CPT

## 2023-03-14 PROCEDURE — 84439 ASSAY OF FREE THYROXINE: CPT

## 2023-03-14 PROCEDURE — 83970 ASSAY OF PARATHORMONE: CPT

## 2023-03-15 ENCOUNTER — VIRTUAL VISIT (OUTPATIENT)
Dept: SURGERY | Facility: CLINIC | Age: 63
End: 2023-03-15
Payer: COMMERCIAL

## 2023-03-15 DIAGNOSIS — R79.89 ABNORMAL TSH: Primary | ICD-10-CM

## 2023-03-15 DIAGNOSIS — E66.01 MORBID OBESITY DUE TO EXCESS CALORIES (H): Primary | ICD-10-CM

## 2023-03-15 NOTE — PROGRESS NOTES
Video-Visit Details    Type of service:  Video Visit    Video Start Time (time video started): 9:15 AM    Video End Time (time video stopped): 9:53 AM    Originating Location (pt. Location): Home    Distant Location (provider location):  Off-site    Mode of Communication:  Video Conference via [facility]    Ludmila has made some changes in eating and lifestyle, but needs to be more mindful about her eating. She will follow up with a list of activities and mindful eating strategies. F32.9; E66.01

## 2023-03-16 LAB
T4 FREE SERPL-MCNC: 0.88 NG/DL (ref 0.9–1.7)
TSH SERPL DL<=0.005 MIU/L-ACNC: 5.3 UIU/ML (ref 0.3–4.2)

## 2023-03-22 ENCOUNTER — VIRTUAL VISIT (OUTPATIENT)
Dept: SURGERY | Facility: CLINIC | Age: 63
End: 2023-03-22
Payer: COMMERCIAL

## 2023-03-22 DIAGNOSIS — E66.01 MORBID OBESITY WITH BMI OF 50.0-59.9, ADULT (H): Primary | ICD-10-CM

## 2023-03-22 PROCEDURE — 97803 MED NUTRITION INDIV SUBSEQ: CPT | Mod: VID

## 2023-03-22 NOTE — PROGRESS NOTES
Ludmila Sanchez is a 62 year old who is being evaluated via a billable video visit.      How would you like to obtain your AVS? MyChart  If the video visit is dropped, the invitation should be resent by: Text to cell phone: [phone]  Will anyone else be joining your video visit? No       Follow Up Surgical Weight Loss Supervised Diet Evaluation    Assessment:  Pt. is being seen today for a follow up RD nutritional evaluation. Pt. has been unsuccessful with non-surgical weight loss methods and is interested in bariatric surgery. Today we reviewed current eating habits and level of physical activity, and instructed on the changes that are required for successful bariatric outcomes.    Surgery of interest per pt: Undecided.    Workflow review:  Support Group: Not completed.  Psychology:In progress.  Lab work:Completed.  SWL:No       Weight goal: At or below initial.    Anthropometrics:  Pt's weight is 335 lbs  Initial weight: 339lbs  Weight change: down 4 lbs  BMI: 55.9 kg/m2    Ideal body weight: 57 kg (125 lb 10.6 oz)  Adjusted ideal body weight: 95.7 kg (211 lb)  Estimated RMR (Keno-St Jeor equation):  2,100 kcals x 1.2 (sedentary) = 2,490 kcals (for weight maintenance)    Medical History:  Patient Active Problem List   Diagnosis     CARDIOVASCULAR SCREENING; LDL GOAL LESS THAN 160     Mild major depression (H)     Vitamin D deficiency     Vitamin B12 deficiency without anemia     Colonic polyp     Obesity     Cerebral cavernoma     Advanced directives, counseling/discussion     TEODORO (obstructive sleep apnea)     Morbid obesity due to excess calories (H)     Bipolar affective disorder in remission (H)     History of hypertension     Hyperlipidemia LDL goal <100     S/P TKR (total knee replacement)     Pruritus     Acute blood loss anemia     Adenomatous polyp of colon, unspecified part of colon      Diabetes: no   HbA1c:  5.9 (11/01/22) - prediabetes     Progress over past month: Patient reports progress if  "going OK. She is still undecided on what surgery she wants and is still nervous or hesitant regarding the surgical process. The biggest challenge nutritionally is waiting the 30 min to drink fluids after meals. She noted from her visits with Dr. Terry that she now asks herself \"am I hungry right now\" before eating. Wants to track her intake via calorie morelia. Continues to chew food well down to smooth consistency. Patient noted she is making more meals at home vs ordering meals in. States she is not hungry as often. She has made great progress on mindful eating habits. Cut back on coffee intake from 4 cups to 2 cups per day. No longer drinking coke a work.    Diet Recall/Time  Breakfast: greek yogurt with fruit OR eggs  Lunch: cottage cheese with steamed vegetables   Dinner: two hawaiian bun sandwiches with protein    Typical Snacks: grapes    Overnight eating: No    Eating out: <1x per week.    Meal duration: 20-30 minutes.      fluids by 30 minutes before, during meal, and waiting 30 minutes after meal before drinking fluids: Yes -  Working on it     Beverages  x2 coffee, Coke zero - sips, water (has increased)    Exercise  No routine at this time d/t in a walking boot.    PES statement:   (NI-1.3) Excessive energy intake related to Food and nutrition related knowledge deficit concerning excessive energy/oral intake as evidenced by Intake of high caloric density foods/beverages (juice, soda, alcohol) at meals and/or snacks; large portions; frequent grazing; estimated intake that exceeds estimated daily energy intake; binge eating patterns; frequent fast food or restaurant intake; and BMI 55.9 kg/m2     Intervention    Nutrition Education:   1. Provided general overview of diet and lifestyle modifications needed to be a deemed a safe candidate for bariatric surgery.   2. Educated patient on how to read a food label: choosing foods with than 10 grams fat and 10 grams sugar per serving to avoid dumping " syndrome.  3. Dumping Syndrome: Described the mechanisms of syndrome, symptoms, and prevention tools from a dietary perspective.   4. Vitamins: Educated on post-op vitamin regimen including MVI+ 18 mg Fe two times a day, calcium citrate 400-600 mg two times a day, 1427-3510 mcg sublingual B12 daily, 5000 IU vitamin D3 daily.     Food/Nutrient Delivery:  5. Educated patient on eating three meals, with cutting out snacking.  6. Bariatric Plate: Patient and I discussed the importance of including a lean protein source (20-30 grams/meal), vegetables (included at lunch and dinner), one serving (15g) of carbohydrate, and limited added fat (1 tb/day) at each meal.   7. Educated patient on how to complete a food journal and benefits of meal planning.   8. Educated patient on using a protein powder drink as a meal replacement and/or supplement after bariatric surgery.   9. Discussed importance of adequate hydration after surgery, with goal of at least 64 oz of fluids/day.  10. Addressed avoiding all carbonated, caffeinated and sweetened drinks to prepare for bariatric surgery.     Nutrition Counselin. Mindful eating techniques: Encouraged slow meal pace, chewing foods to applesauce consistency for 20-30 minutes/meal.   12. Discussed  fluids 30 minutes before, during, and after meal to prevent dumping syndrome and discomfort post bariatric surgery.   13. Discussed pre/post operative diet progression, post op vitamin regimen, gave review of surgery process.     Instructions/Goals:     1. Continue implementing bariatric surgery lifestyle modifications.  2. Fill out food journal and return at follow up.    Handouts Provided:   Protein source list.    Monitor/Evaluation:  Pt. s target weight: no gain from initial visit, pt. verbalized understanding.       Plan for next visit:   Patient would like to have a follow up in one month to check in and see where she is at. Would cami to practice tracking her intake aiming  for 2,000 kcal per day.      Video-Visit Details    Type of service:  Video Visit    Video Start Time (time video started): 10:33 am    Video End Time (time video stopped): 10:57 am    Originating Location (pt. Location): Home        Distant Location (provider location):  On-site    Mode of Communication:  Video Conference via [facility]    Physician has received verbal consent for a Video Visit from the patient? Yes      Allyn Parsons RD

## 2023-03-22 NOTE — LETTER
3/22/2023         RE: Ludmila Sanchez  [address]  [place] [address]        Dear Colleague,    Thank you for referring your patient, Ludmila Sanchez, to the [facility] AND [facility]ARE [place]. Please see a copy of my visit note below.    Ludmila Sanchez is a 62 year old who is being evaluated via a billable video visit.      How would you like to obtain your AVS? MyChart  If the video visit is dropped, the invitation should be resent by: Text to cell phone: [phone]  Will anyone else be joining your video visit? No       Follow Up Surgical Weight Loss Supervised Diet Evaluation    Assessment:  Pt. is being seen today for a follow up RD nutritional evaluation. Pt. has been unsuccessful with non-surgical weight loss methods and is interested in bariatric surgery. Today we reviewed current eating habits and level of physical activity, and instructed on the changes that are required for successful bariatric outcomes.    Surgery of interest per pt: Undecided.    Workflow review:  Support Group: Not completed.  Psychology:In progress.  Lab work:Completed.  SWL:No       Weight goal: At or below initial.    Anthropometrics:  Pt's weight is 335 lbs  Initial weight: 339lbs  Weight change: down 4 lbs  BMI: 55.9 kg/m2    Ideal body weight: 57 kg (125 lb 10.6 oz)  Adjusted ideal body weight: 95.7 kg (211 lb)  Estimated RMR (Alva-St Jeor equation):  2,100 kcals x 1.2 (sedentary) = 2,490 kcals (for weight maintenance)    Medical History:  Patient Active Problem List   Diagnosis     CARDIOVASCULAR SCREENING; LDL GOAL LESS THAN 160     Mild major depression (H)     Vitamin D deficiency     Vitamin B12 deficiency without anemia     Colonic polyp     Obesity     Cerebral cavernoma     Advanced directives, counseling/discussion     TEODORO (obstructive sleep apnea)     Morbid obesity due to excess calories (H)     Bipolar affective disorder in remission (H)     History of hypertension      "Hyperlipidemia LDL goal <100     S/P TKR (total knee replacement)     Pruritus     Acute blood loss anemia     Adenomatous polyp of colon, unspecified part of colon      Diabetes: no   HbA1c:  5.9 (11/01/22) - prediabetes     Progress over past month: Patient reports progress if going OK. She is still undecided on what surgery she wants and is still nervous or hesitant regarding the surgical process. The biggest challenge nutritionally is waiting the 30 min to drink fluids after meals. She noted from her visits with Dr. Terry that she now asks herself \"am I hungry right now\" before eating. Wants to track her intake via calorie morelia. Continues to chew food well down to smooth consistency. Patient noted she is making more meals at home vs ordering meals in. States she is not hungry as often. She has made great progress on mindful eating habits. Cut back on coffee intake from 4 cups to 2 cups per day. No longer drinking coke a work.    Diet Recall/Time  Breakfast: greek yogurt with fruit OR eggs  Lunch: cottage cheese with steamed vegetables   Dinner: two hawaiian bun sandwiches with protein    Typical Snacks: grapes    Overnight eating: No    Eating out: <1x per week.    Meal duration: 20-30 minutes.      fluids by 30 minutes before, during meal, and waiting 30 minutes after meal before drinking fluids: Yes -  Working on it     Beverages  x2 coffee, Coke zero - sips, water (has increased)    Exercise  No routine at this time d/t in a walking boot.    PES statement:   (NI-1.3) Excessive energy intake related to Food and nutrition related knowledge deficit concerning excessive energy/oral intake as evidenced by Intake of high caloric density foods/beverages (juice, soda, alcohol) at meals and/or snacks; large portions; frequent grazing; estimated intake that exceeds estimated daily energy intake; binge eating patterns; frequent fast food or restaurant intake; and BMI 55.9 kg/m2     Intervention    Nutrition " Education:   1. Provided general overview of diet and lifestyle modifications needed to be a deemed a safe candidate for bariatric surgery.   2. Educated patient on how to read a food label: choosing foods with than 10 grams fat and 10 grams sugar per serving to avoid dumping syndrome.  3. Dumping Syndrome: Described the mechanisms of syndrome, symptoms, and prevention tools from a dietary perspective.   4. Vitamins: Educated on post-op vitamin regimen including MVI+ 18 mg Fe two times a day, calcium citrate 400-600 mg two times a day, 7771-8908 mcg sublingual B12 daily, 5000 IU vitamin D3 daily.     Food/Nutrient Delivery:  5. Educated patient on eating three meals, with cutting out snacking.  6. Bariatric Plate: Patient and I discussed the importance of including a lean protein source (20-30 grams/meal), vegetables (included at lunch and dinner), one serving (15g) of carbohydrate, and limited added fat (1 tb/day) at each meal.   7. Educated patient on how to complete a food journal and benefits of meal planning.   8. Educated patient on using a protein powder drink as a meal replacement and/or supplement after bariatric surgery.   9. Discussed importance of adequate hydration after surgery, with goal of at least 64 oz of fluids/day.  10. Addressed avoiding all carbonated, caffeinated and sweetened drinks to prepare for bariatric surgery.     Nutrition Counselin. Mindful eating techniques: Encouraged slow meal pace, chewing foods to applesauce consistency for 20-30 minutes/meal.   12. Discussed  fluids 30 minutes before, during, and after meal to prevent dumping syndrome and discomfort post bariatric surgery.   13. Discussed pre/post operative diet progression, post op vitamin regimen, gave review of surgery process.     Instructions/Goals:     1. Continue implementing bariatric surgery lifestyle modifications.  2. Fill out food journal and return at follow up.    Handouts Provided:   Protein source  list.    Monitor/Evaluation:  Pt. s target weight: no gain from initial visit, pt. verbalized understanding.       Plan for next visit:   Patient would like to have a follow up in one month to check in and see where she is at. Would cami to practice tracking her intake aiming for 2,000 kcal per day.      Video-Visit Details    Type of service:  Video Visit    Video Start Time (time video started): 10:33 am    Video End Time (time video stopped): 10:57 am    Originating Location (pt. Location): Home        Distant Location (provider location):  On-site    Mode of Communication:  Video Conference via [facility]    Physician has received verbal consent for a Video Visit from the patient? Yes      Allyn Parsons RD              Again, thank you for allowing me to participate in the care of your patient.        Sincerely,        Allyn Parsons RD

## 2023-03-29 ENCOUNTER — OFFICE VISIT (OUTPATIENT)
Dept: DERMATOLOGY | Facility: CLINIC | Age: 63
End: 2023-03-29
Attending: FAMILY MEDICINE
Payer: COMMERCIAL

## 2023-03-29 DIAGNOSIS — L82.1 SEBORRHEIC KERATOSIS: ICD-10-CM

## 2023-03-29 DIAGNOSIS — L28.1 PRURIGO NODULARIS: Primary | ICD-10-CM

## 2023-03-29 DIAGNOSIS — D18.01 CHERRY ANGIOMA: ICD-10-CM

## 2023-03-29 DIAGNOSIS — L81.4 LENTIGO: ICD-10-CM

## 2023-03-29 DIAGNOSIS — D22.9 MULTIPLE BENIGN NEVI: ICD-10-CM

## 2023-03-29 PROCEDURE — 99204 OFFICE O/P NEW MOD 45 MIN: CPT | Performed by: PHYSICIAN ASSISTANT

## 2023-03-29 RX ORDER — CLOBETASOL PROPIONATE 0.5 MG/G
CREAM TOPICAL
Qty: 30 G | Refills: 4 | Status: SHIPPED | OUTPATIENT
Start: 2023-03-29 | End: 2023-07-21
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
## 2023-04-17 ENCOUNTER — TRANSFERRED RECORDS (OUTPATIENT)
Dept: HEALTH INFORMATION MANAGEMENT | Facility: CLINIC | Age: 63
End: 2023-04-17

## 2023-04-17 ENCOUNTER — VIRTUAL VISIT (OUTPATIENT)
Dept: SURGERY | Facility: CLINIC | Age: 63
End: 2023-04-17
Payer: COMMERCIAL

## 2023-04-17 DIAGNOSIS — E66.01 MORBID OBESITY DUE TO EXCESS CALORIES (H): Primary | ICD-10-CM

## 2023-04-17 NOTE — PROGRESS NOTES
Virtual Visit Details    Type of service:  Video Visit     Originating Location (pt. Location): Home  Distant Location (provider location):  Off-site  Platform used for Video Visit: [employer]     Start Time: 9:18 AM  End Time: 9:43 AM    Ludmila has made quality changes in eating and lifestyle and appears more mindful about her eating. She is now ready to proceed with surgery. A report was sent to the clinic. F32.9; E66.01

## 2023-04-20 ENCOUNTER — NURSE TRIAGE (OUTPATIENT)
Dept: NURSING | Facility: CLINIC | Age: 63
End: 2023-04-20
Payer: COMMERCIAL

## 2023-04-20 NOTE — TELEPHONE ENCOUNTER
Sports medicine clinic extender: please contact pt re: question. Ok to return to boot based on my understanding of the question.  Thanks.  Josh Coffey DO, CAQ

## 2023-04-20 NOTE — TELEPHONE ENCOUNTER
Patient is calling and states that she has a piece of bone on the bottom of her heel and was wearing a walking boot and using a heal cup and now feels that it is getting worse.  Patient is walking awkward and is wanting to know if she should put her walking boot back on until she is seen in clinic.  Please phone patient.       Reason for Disposition    Nursing judgment    Additional Information    Negative: Nursing judgment    Negative: Nursing judgment    Negative: Nursing judgment    Protocols used: INFORMATION ONLY CALL - NO TRIAGE-A-OH

## 2023-04-20 NOTE — TELEPHONE ENCOUNTER
Pt was contacted via [employer] where other request was made for advice. Advised to continue with boot if more stable and gives relief until next appt. Also advised continuation of rest/ice/OTC which was previously discussed.  EMIGDIO Alvarado, ATC

## 2023-04-24 ENCOUNTER — OFFICE VISIT (OUTPATIENT)
Dept: ORTHOPEDICS | Facility: CLINIC | Age: 63
End: 2023-04-24
Payer: COMMERCIAL

## 2023-04-24 VITALS
BODY MASS INDEX: 47.09 KG/M2 | SYSTOLIC BLOOD PRESSURE: 132 MMHG | WEIGHT: 293 LBS | DIASTOLIC BLOOD PRESSURE: 78 MMHG | HEIGHT: 66 IN

## 2023-04-24 DIAGNOSIS — M79.672 LEFT FOOT PAIN: ICD-10-CM

## 2023-04-24 DIAGNOSIS — M79.672 PAIN OF LEFT HEEL: Primary | ICD-10-CM

## 2023-04-24 PROCEDURE — 99213 OFFICE O/P EST LOW 20 MIN: CPT | Performed by: PEDIATRICS

## 2023-04-24 NOTE — LETTER
4/24/2023         RE: Ludmila Sanchez  [address]  [place] [address]        Dear Colleague,    Thank you for referring your patient, Ludmila Sanchez, to the [facility] [place]. Please see a copy of my visit note below.    ASSESSMENT & PLAN    Diagnoses and all orders for this visit:    Left foot pain  -     Orthopedic  Referral; Future  -     Rolling Knee Walker/Scooter Order for DME - ONLY FOR DME    Pain of left heel  -     Orthopedic  Referral; Future  -     Rolling Knee Walker/Scooter Order for DME - ONLY FOR DME      She desires further evaluation, assess whether procedure may be beneficial for heel pain, in light of the previous x-ray findings. Can refer to podiatry, she desired this, referral placed.  Can continue with current tx in meantime. She inquired about more assistance with getting around, continued pain; DME order for knee scooter for now.  Questions answered. Discussed signs and symptoms that may indicate more serious issues; the patient was instructed to seek appropriate care if noted. Ludmila indicates understanding of these issues and agrees with the plan.        See Patient Instructions  Patient Instructions   Ongoing heel pain reviewed today.  No additional imaging required currently, though we could consider MRI for further evaluation depending on course.  Otherwise, reviewed support that you have done so far, including with heel cup/cushions and also use of Cam walker.  Physical therapy is consideration; if interested in PT, contact clinic.  Discussed potential for an injection, for plantar heel pain.  Also discussed referral on for further discussion with podiatry, particularly with history of heel pain and injection.  Following discussion, referral placed to podiatry next.  In the meantime, we discussed use of an ambulatory aid such as a knee scooter.  DME order provided for this.  We will leave follow-up here open-ended.  Contact  "clinic if any other questions or concerns.    If you have any further questions for your physician or physician s care team you can contact them thru MyChart or by calling  [phone] and use option 3 to leave a voice message.   Messages received during business hours will be returned same day.          DO ANDRÉS Castillo [facility]    SUBJECTIVE- Interim History April 24, 2023    No chief complaint on file.      Ludmila Sanchez is a 62 year old female who is seen in f/u up for    Left foot pain  Pain of left heel. Since last visit on 3/13/23 patient has had an increase in heel pain. She reports that it was originally getting better, took the boot off and went to a heel cup, then after a few days, she had an increase in heel pain. She has started using the boot again and had no improvement.  - Now ~ 2 months from initial onset    Worsened by: walking, standing, wb, and pain after walking while sitting  Better with: putting the foot up and tylenol, rest  Treatments tried: Tylenol, aspercream, rollers  Associated symptoms:  Sharp pain and continuous pain    The patient is seen by themselves.    Orthopedic/Surgical history: NO  Social History/Occupation: [employer]    **  Used boot for ~4 weeks.  Then started some green heel cushioning. Then noted pain started to increase again.  Pain constant with walking.      REVIEW OF SYSTEMS:  Review of Systems      OBJECTIVE:  Ht 1.676 m (5' 6\")   Wt (!) 153.8 kg (339 lb)   LMP 11/01/2011   BMI 54.72 kg/m       Left foot:  Plantar heel tenderness, origin of plantar fascia, more inferior heel, to posterior-inferior aspect  Nontender distal achilles, posterior heel  Mild discomfort with heel cord stretch      RADIOLOGY:  Previous x-rays:    Narrative & Impression   FOOT THREE VIEWS LEFT  3/13/2023 10:05 AM      HISTORY: Left foot pain  COMPARISON: None.                                                                    "   IMPRESSION: No acute fracture or malalignment. Moderate to severe  degenerative changes of the naviculocuneiform and TMT joints with  joint space narrowing and osteophytosis. Moderate plantar calcaneal  enthesophyte. Bipartite medial hallux sesamoid.     CAROLYN WALSH MD                 Again, thank you for allowing me to participate in the care of your patient.        Sincerely,        Josh Coffey,

## 2023-04-24 NOTE — PROGRESS NOTES
ASSESSMENT & PLAN    Diagnoses and all orders for this visit:    Left foot pain  -     Orthopedic  Referral; Future  -     Rolling Knee Walker/Scooter Order for DME - ONLY FOR DME    Pain of left heel  -     Orthopedic  Referral; Future  -     Rolling Knee Walker/Scooter Order for DME - ONLY FOR DME      She desires further evaluation, assess whether procedure may be beneficial for heel pain, in light of the previous x-ray findings. Can refer to podiatry, she desired this, referral placed.  Can continue with current tx in meantime. She inquired about more assistance with getting around, continued pain; DME order for knee scooter for now.  Questions answered. Discussed signs and symptoms that may indicate more serious issues; the patient was instructed to seek appropriate care if noted. Ludmila indicates understanding of these issues and agrees with the plan.        See Patient Instructions  Patient Instructions   Ongoing heel pain reviewed today.  No additional imaging required currently, though we could consider MRI for further evaluation depending on course.  Otherwise, reviewed support that you have done so far, including with heel cup/cushions and also use of Cam walker.  Physical therapy is consideration; if interested in PT, contact clinic.  Discussed potential for an injection, for plantar heel pain.  Also discussed referral on for further discussion with podiatry, particularly with history of heel pain and injection.  Following discussion, referral placed to podiatry next.  In the meantime, we discussed use of an ambulatory aid such as a knee scooter.  DME order provided for this.  We will leave follow-up here open-ended.  Contact clinic if any other questions or concerns.    If you have any further questions for your physician or physician s care team you can contact them thru [employer] or by calling  [phone] and use option 3 to leave a voice message.   Messages received during business  "hours will be returned same day.          DO ANDRÉS Castillo [facility]    SUBJECTIVE- Interim History April 24, 2023    No chief complaint on file.      Ludmila Sanchez is a 62 year old female who is seen in f/u up for    Left foot pain  Pain of left heel. Since last visit on 3/13/23 patient has had an increase in heel pain. She reports that it was originally getting better, took the boot off and went to a heel cup, then after a few days, she had an increase in heel pain. She has started using the boot again and had no improvement.  - Now ~ 2 months from initial onset    Worsened by: walking, standing, wb, and pain after walking while sitting  Better with: putting the foot up and tylenol, rest  Treatments tried: Tylenol, aspercream, rollers  Associated symptoms:  Sharp pain and continuous pain    The patient is seen by themselves.    Orthopedic/Surgical history: NO  Social History/Occupation: [employer]    **  Used boot for ~4 weeks.  Then started some green heel cushioning. Then noted pain started to increase again.  Pain constant with walking.      REVIEW OF SYSTEMS:  Review of Systems      OBJECTIVE:  Ht 1.676 m (5' 6\")   Wt (!) 153.8 kg (339 lb)   LMP 11/01/2011   BMI 54.72 kg/m       Left foot:  Plantar heel tenderness, origin of plantar fascia, more inferior heel, to posterior-inferior aspect  Nontender distal achilles, posterior heel  Mild discomfort with heel cord stretch      RADIOLOGY:  Previous x-rays:    Narrative & Impression   FOOT THREE VIEWS LEFT  3/13/2023 10:05 AM      HISTORY: Left foot pain  COMPARISON: None.                                                                      IMPRESSION: No acute fracture or malalignment. Moderate to severe  degenerative changes of the naviculocuneiform and TMT joints with  joint space narrowing and osteophytosis. Moderate plantar calcaneal  enthesophyte. Bipartite medial hallux sesamoid.     CAROLYN WALSH, " MD

## 2023-04-24 NOTE — PATIENT INSTRUCTIONS
Ongoing heel pain reviewed today.  No additional imaging required currently, though we could consider MRI for further evaluation depending on course.  Otherwise, reviewed support that you have done so far, including with heel cup/cushions and also use of Cam walker.  Physical therapy is consideration; if interested in PT, contact clinic.  Discussed potential for an injection, for plantar heel pain.  Also discussed referral on for further discussion with podiatry, particularly with history of heel pain and injection.  Following discussion, referral placed to podiatry next.  In the meantime, we discussed use of an ambulatory aid such as a knee scooter.  DME order provided for this.  We will leave follow-up here open-ended.  Contact clinic if any other questions or concerns.    If you have any further questions for your physician or physician s care team you can contact them thru [employer] or by calling  [phone] and use option 3 to leave a voice message.   Messages received during business hours will be returned same day.

## 2023-04-25 ENCOUNTER — VIRTUAL VISIT (OUTPATIENT)
Dept: SURGERY | Facility: CLINIC | Age: 63
End: 2023-04-25
Payer: COMMERCIAL

## 2023-04-25 DIAGNOSIS — E66.01 MORBID OBESITY WITH BMI OF 50.0-59.9, ADULT (H): Primary | ICD-10-CM

## 2023-04-25 PROCEDURE — 97803 MED NUTRITION INDIV SUBSEQ: CPT | Mod: VID

## 2023-04-25 NOTE — LETTER
4/25/2023         RE: Ludmila Sanchez  [address]  [place] [address]        Dear Colleague,    Thank you for referring your patient, Ludmila Sanchez, to the [facility] [place]. Please see a copy of my visit note below.    Ludmila Sanchez is a 62 year old who is being evaluated via a billable video visit.      How would you like to obtain your AVS? MyChart  If the video visit is dropped, the invitation should be resent by: Text to cell phone: [phone]  Will anyone else be joining your video visit? No       Follow Up Surgical Weight Loss Supervised Diet Evaluation    Assessment:  Pt. is being seen today for a follow up RD nutritional evaluation. Pt. has been unsuccessful with non-surgical weight loss methods and is interested in bariatric surgery. Today we reviewed current eating habits and level of physical activity, and instructed on the changes that are required for successful bariatric outcomes.    Surgery of interest per pt: Undecided.    Workflow review:  Support Group: Not completed.  Psychology:Completed.  Lab work:Completed.  SWL:No     Weight goal: At or below initial.    Anthropometrics:  Pt's weight is 339 lbs  Initial weight: 339 lbs  Weight change: up 4 lbs from last visit - noted she has her shoes on and a walking boot  BMI: 54.72 kg/m2    Ideal body weight: 57 kg (125 lb 10.6 oz)  Adjusted ideal body weight: 95.7 kg (211 lb)  Estimated RMR ([place]-St Jeor equation):  2,100 kcals x 1.2 (sedentary) = 2,490 kcals (for weight maintenance)    Medical History:  Patient Active Problem List   Diagnosis     CARDIOVASCULAR SCREENING; LDL GOAL LESS THAN 160     Mild major depression (H)     Vitamin D deficiency     Vitamin B12 deficiency without anemia     Colonic polyp     Obesity     Cerebral cavernoma     Advanced directives, counseling/discussion     TEODORO (obstructive sleep apnea)     Morbid obesity due to excess calories (H)     Bipolar affective  disorder in remission (H)     History of hypertension     Hyperlipidemia LDL goal <100     S/P TKR (total knee replacement)     Pruritus     Acute blood loss anemia     Adenomatous polyp of colon, unspecified part of colon      Diabetes: no   HbA1c:  5.9 (11/01/22) - prediabetes     Progress over past month: Patient reports she did not follow through with tracking goals. She is still undecided on what surgery she wants and is still nervous or hesitant regarding the surgical process. Noted she started having a protein shake at lunch. Completely weaned from coffee. Continues to chew food well down to smooth consistency. She has made great progress on mindful eating habits.       Diet Recall/Time  Breakfast: lunch is served at work: greek yogurt with fruit OR eggs  Lunch: lunch is served at work; chicken, potato and vegetable   Dinner: protein shake    Typical Snacks: none lately maybe banana    Overnight eating: No    Eating out: <1x per week.    Meal duration: 20-30 minutes.      fluids by 30 minutes before, during meal, and waiting 30 minutes after meal before drinking fluids: Yes -  Working on it     Beverages   Tea (hot), Coke zero - sips, water (has increased significnarl)    Exercise  No routine at this time d/t in a walking boot.    PES statement:   Morbid obesity related knowledge deficit concerning excessive energy/oral intake as evidenced by estimated intake that exceeds estimated daily energy intake; frequent fast food or restaurant intake; and BMI 54.7 kg/m2     Intervention    Nutrition Education:   1. Provided general overview of diet and lifestyle modifications needed to be a deemed a safe candidate for bariatric surgery.   2. Educated patient on how to read a food label: choosing foods with than 10 grams fat and 10 grams sugar per serving to avoid dumping syndrome.    Food/Nutrient Delivery:  3. Educated patient on eating three meals, with cutting out snacking.  4. Educated patient on using a  protein powder drink as a meal replacement and/or supplement after bariatric surgery.   5. Discussed importance of adequate hydration after surgery, with goal of at least 64 oz of fluids/day.  6. Addressed avoiding all carbonated, caffeinated and sweetened drinks to prepare for bariatric surgery.     Nutrition Counselin. Mindful eating techniques: Encouraged slow meal pace, chewing foods to applesauce consistency for 20-30 minutes/meal.   8. Discussed  fluids 30 minutes before, during, and after meal to prevent dumping syndrome and discomfort post bariatric surgery.     Instructions/Goals:     1. Continue implementing bariatric surgery lifestyle modifications.      Handouts Provided:   None     Monitor/Evaluation:  Pt. s target weight: no gain from initial visit, pt. verbalized understanding.       Plan for next visit:   Patient would like to have a follow up in one month to check in and see where she is at. Would cami to practice tracking her intake and attend the support group before continuing.    Video-Visit Details    Type of service:  Video Visit    Video Start Time (time video started): 10:26 am    Video End Time (time video stopped): 10:44 am    Originating Location (pt. Location): Home        Distant Location (provider location):  On-site    Mode of Communication:  Video Conference via [facility]    Physician has received verbal consent for a Video Visit from the patient? Yes      Allyn Parsons RD            Again, thank you for allowing me to participate in the care of your patient.        Sincerely,        Allyn Parsons RD

## 2023-04-25 NOTE — PROGRESS NOTES
Ludmila Sanchez is a 62 year old who is being evaluated via a billable video visit.      How would you like to obtain your AVS? MyChart  If the video visit is dropped, the invitation should be resent by: Text to cell phone: [phone]  Will anyone else be joining your video visit? No       Follow Up Surgical Weight Loss Supervised Diet Evaluation    Assessment:  Pt. is being seen today for a follow up RD nutritional evaluation. Pt. has been unsuccessful with non-surgical weight loss methods and is interested in bariatric surgery. Today we reviewed current eating habits and level of physical activity, and instructed on the changes that are required for successful bariatric outcomes.    Surgery of interest per pt: Undecided.    Workflow review:  Support Group: Not completed.  Psychology:Completed.  Lab work:Completed.  SWL:No     Weight goal: At or below initial.    Anthropometrics:  Pt's weight is 339 lbs  Initial weight: 339 lbs  Weight change: up 4 lbs from last visit - noted she has her shoes on and a walking boot  BMI: 54.72 kg/m2    Ideal body weight: 57 kg (125 lb 10.6 oz)  Adjusted ideal body weight: 95.7 kg (211 lb)  Estimated RMR ([place]-St Jeor equation):  2,100 kcals x 1.2 (sedentary) = 2,490 kcals (for weight maintenance)    Medical History:  Patient Active Problem List   Diagnosis     CARDIOVASCULAR SCREENING; LDL GOAL LESS THAN 160     Mild major depression (H)     Vitamin D deficiency     Vitamin B12 deficiency without anemia     Colonic polyp     Obesity     Cerebral cavernoma     Advanced directives, counseling/discussion     TEODORO (obstructive sleep apnea)     Morbid obesity due to excess calories (H)     Bipolar affective disorder in remission (H)     History of hypertension     Hyperlipidemia LDL goal <100     S/P TKR (total knee replacement)     Pruritus     Acute blood loss anemia     Adenomatous polyp of colon, unspecified part of colon      Diabetes: no   HbA1c:  5.9 (11/01/22) - prediabetes      Progress over past month: Patient reports she did not follow through with tracking goals. She is still undecided on what surgery she wants and is still nervous or hesitant regarding the surgical process. Noted she started having a protein shake at lunch. Completely weaned from coffee. Continues to chew food well down to smooth consistency. She has made great progress on mindful eating habits.       Diet Recall/Time  Breakfast: lunch is served at work: greek yogurt with fruit OR eggs  Lunch: lunch is served at work; chicken, potato and vegetable   Dinner: protein shake    Typical Snacks: none lately maybe banana    Overnight eating: No    Eating out: <1x per week.    Meal duration: 20-30 minutes.      fluids by 30 minutes before, during meal, and waiting 30 minutes after meal before drinking fluids: Yes -  Working on it     Beverages   Tea (hot), Coke zero - sips, water (has increased significnarl)    Exercise  No routine at this time d/t in a walking boot.    PES statement:   Morbid obesity related knowledge deficit concerning excessive energy/oral intake as evidenced by estimated intake that exceeds estimated daily energy intake; frequent fast food or restaurant intake; and BMI 54.7 kg/m2     Intervention    Nutrition Education:   1. Provided general overview of diet and lifestyle modifications needed to be a deemed a safe candidate for bariatric surgery.   2. Educated patient on how to read a food label: choosing foods with than 10 grams fat and 10 grams sugar per serving to avoid dumping syndrome.    Food/Nutrient Delivery:  3. Educated patient on eating three meals, with cutting out snacking.  4. Educated patient on using a protein powder drink as a meal replacement and/or supplement after bariatric surgery.   5. Discussed importance of adequate hydration after surgery, with goal of at least 64 oz of fluids/day.  6. Addressed avoiding all carbonated, caffeinated and sweetened drinks to prepare for  bariatric surgery.     Nutrition Counselin. Mindful eating techniques: Encouraged slow meal pace, chewing foods to applesauce consistency for 20-30 minutes/meal.   8. Discussed  fluids 30 minutes before, during, and after meal to prevent dumping syndrome and discomfort post bariatric surgery.     Instructions/Goals:     1. Continue implementing bariatric surgery lifestyle modifications.      Handouts Provided:   None     Monitor/Evaluation:  Pt. s target weight: no gain from initial visit, pt. verbalized understanding.       Plan for next visit:   Patient would like to have a follow up in one month to check in and see where she is at. Would cami to practice tracking her intake and attend the support group before continuing.    Video-Visit Details    Type of service:  Video Visit    Video Start Time (time video started): 10:26 am    Video End Time (time video stopped): 10:44 am    Originating Location (pt. Location): Home        Distant Location (provider location):  On-site    Mode of Communication:  Video Conference via [facility]    Physician has received verbal consent for a Video Visit from the patient? Yes      Allyn Parsons RD

## 2023-04-26 ENCOUNTER — OFFICE VISIT (OUTPATIENT)
Dept: PODIATRY | Facility: CLINIC | Age: 63
End: 2023-04-26
Attending: PEDIATRICS
Payer: COMMERCIAL

## 2023-04-26 VITALS — OXYGEN SATURATION: 96 % | BODY MASS INDEX: 54.55 KG/M2 | HEART RATE: 100 BPM | WEIGHT: 293 LBS

## 2023-04-26 DIAGNOSIS — M76.62 TENDONITIS, ACHILLES, LEFT: ICD-10-CM

## 2023-04-26 DIAGNOSIS — M72.2 PLANTAR FASCIITIS, LEFT: Primary | ICD-10-CM

## 2023-04-26 DIAGNOSIS — Q66.6 PES VALGUS OF LEFT FOOT: ICD-10-CM

## 2023-04-26 DIAGNOSIS — Q66.222 METATARSUS ADDUCTUS OF LEFT FOOT: ICD-10-CM

## 2023-04-26 PROCEDURE — 99203 OFFICE O/P NEW LOW 30 MIN: CPT | Mod: 25 | Performed by: PODIATRIST

## 2023-04-26 PROCEDURE — 20550 NJX 1 TENDON SHEATH/LIGAMENT: CPT | Mod: LT | Performed by: PODIATRIST

## 2023-04-26 RX ORDER — DEXAMETHASONE SODIUM PHOSPHATE 4 MG/ML
4 INJECTION, SOLUTION INTRA-ARTICULAR; INTRALESIONAL; INTRAMUSCULAR; INTRAVENOUS; SOFT TISSUE ONCE
Status: COMPLETED | OUTPATIENT
Start: 2023-04-26 | End: 2023-04-26

## 2023-04-26 RX ORDER — BUPIVACAINE HYDROCHLORIDE 5 MG/ML
1 INJECTION, SOLUTION PERINEURAL ONCE
Status: COMPLETED | OUTPATIENT
Start: 2023-04-26 | End: 2023-04-26

## 2023-04-26 RX ORDER — TRIAMCINOLONE ACETONIDE 40 MG/ML
40 INJECTION, SUSPENSION INTRA-ARTICULAR; INTRAMUSCULAR ONCE
Status: COMPLETED | OUTPATIENT
Start: 2023-04-26 | End: 2023-04-26

## 2023-04-26 RX ADMIN — TRIAMCINOLONE ACETONIDE 40 MG: 40 INJECTION, SUSPENSION INTRA-ARTICULAR; INTRAMUSCULAR at 09:44

## 2023-04-26 RX ADMIN — DEXAMETHASONE SODIUM PHOSPHATE 4 MG: 4 INJECTION, SOLUTION INTRA-ARTICULAR; INTRALESIONAL; INTRAMUSCULAR; INTRAVENOUS; SOFT TISSUE at 09:43

## 2023-04-26 RX ADMIN — BUPIVACAINE HYDROCHLORIDE 5 MG: 5 INJECTION, SOLUTION PERINEURAL at 09:43
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
## 2023-04-26 NOTE — PATIENT INSTRUCTIONS
"PATIENT INSTRUCTIONS - Podiatry / Foot & Ankle Surgery      Note for off work until next 5/1/23, then seated work for 2 weeks        Aftercare for Steroid Injection  Activity:  -Resume normal activity as tolerated.  -Tendon, ligament, fascia injectionons- avoid high-impact activity for 1 week.  Icing:  -May apply to the injection site if needed.  Infection:  -Risk is generally low (<1%), but must be aware of the signs/symptoms.    -Both infection and an inflammatory reaction to the steroid (\"steroid flare\") will cause redness, warmth, and increased pain.   -If these symptoms develop within 12-24h & resolve within 2-3 days- \"steroid flare\"- ice (non-worrisome)  -If these symptoms develop after 24-48h, progressively get worse, & are associated with a fever- possible infection; call the clinic or present to urgent care immediately      Diclofenac / Voltaren Gel- Apply to affected area only.  Apply 3-4 times daily for the first 3-4 days, then 1-2 times daily as needed.  Over the counter (OTC), a prescription is not needed.  Available at most pharmacies, [facility], [employer].      Physical Therapy  You have been referred to [facility].  Locations can be found online.  Please call to schedule an appointment:  [phone]        [place] [facility] LOCATIONS  [facility]  [address]  [place] [address]  Phone: [phone]  Fax: [phone] [facility]  [address]  [place] [address]  Phone: [phone]   Fax: [phone]   [facility]  [address]  [place] [address]  Phone: [phone]  Fax: [phone] [facility]  [address]  [place] [address]  Phone: [phone]   Fax: [phone]   [facility]   [address]  [place] [address]  Phone: [phone]  Fax: [phone] * Please call any location listed to make an " appointment for a casting/fitting. Your referral was sent to their central office and they will all have the order on file.
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
## 2023-04-26 NOTE — LETTER
April 26, 2023      Ludmila Sanchez  [address]  [place] [address]        To Whom It May Concern:    Ludmila Sanchez was seen in our clinic. NO work Until  5/1/23. She may return to work 5/1/23 with the following: limited to light duty - seated work only for 2 weeks.      Sincerely,        Candis Hyman, PELON/geoffrey

## 2023-05-01 ENCOUNTER — TELEPHONE (OUTPATIENT)
Dept: ORTHOPEDICS | Facility: CLINIC | Age: 63
End: 2023-05-01

## 2023-05-01 DIAGNOSIS — Q66.222 METATARSUS ADDUCTUS OF LEFT FOOT: ICD-10-CM

## 2023-05-01 DIAGNOSIS — M76.62 TENDONITIS, ACHILLES, LEFT: ICD-10-CM

## 2023-05-01 DIAGNOSIS — M72.2 PLANTAR FASCIITIS OF LEFT FOOT: Primary | ICD-10-CM

## 2023-05-03 ENCOUNTER — OFFICE VISIT (OUTPATIENT)
Dept: PODIATRY | Facility: CLINIC | Age: 63
End: 2023-05-03
Payer: COMMERCIAL

## 2023-05-03 VITALS
WEIGHT: 293 LBS | DIASTOLIC BLOOD PRESSURE: 73 MMHG | HEART RATE: 81 BPM | BODY MASS INDEX: 54.55 KG/M2 | SYSTOLIC BLOOD PRESSURE: 124 MMHG

## 2023-05-03 DIAGNOSIS — M76.62 TENDONITIS, ACHILLES, LEFT: ICD-10-CM

## 2023-05-03 DIAGNOSIS — M72.2 PLANTAR FASCIITIS, LEFT: Primary | ICD-10-CM

## 2023-05-03 PROCEDURE — 99214 OFFICE O/P EST MOD 30 MIN: CPT | Performed by: PODIATRIST

## 2023-05-03 NOTE — PATIENT INSTRUCTIONS
PATIENT INSTRUCTIONS - Podiatry / Foot & Ankle Surgery      Note for seated work in the boot x1 month    Our back Fax #      Diclofenac / Voltaren - 1 pill twice daily x1 week.  Then take a 1 week break.  Repeat as needed.  Take with food & an acid blocker if stomach upset occurs.  Stop all other NSAIDs (aspirin, ibuprofen/Motrin, naproxen/ Aleve).  -Ok to continue tylenol but do not use gel while taking      Physical Therapy   Move PT appointment up - another location  You have been referred to [facility].  Locations can be found online.  Please call to schedule an appointment:  [phone]        Boot Immobilization:  Tall Aircast boot 8.5   Wear the boot at all times when walking or standing.  Wear the boot for 2-4 weeks or 1 week beyond resolution of pain.  You may remove the boot when at rest for ankle and toe motion & for bathing/showering.  Do not drive in the boot; wear the boot to & from the car, then switch to a stiff soled shoe for driving.  You do not need to sleep in the boot.  Wear compression (ACE bandage or Tubigrip) under the boot to decrease swelling.  Minimal activity in the boot
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
## 2023-05-03 NOTE — LETTER
May 3, 2023      Ludmila Sanchez  [address]  [place] [address]        To Whom It May Concern:    Ludmila Sanchez was seen in our clinic. She may return to work with the following work restrictions:     Seated work in the boot for 1 month       Sincerely,        Candis Hyman DPM

## 2023-05-05 ENCOUNTER — MYC MEDICAL ADVICE (OUTPATIENT)
Dept: FAMILY MEDICINE | Facility: CLINIC | Age: 63
End: 2023-05-05
Payer: COMMERCIAL

## 2023-05-05 ENCOUNTER — MEDICAL CORRESPONDENCE (OUTPATIENT)
Dept: HEALTH INFORMATION MANAGEMENT | Facility: CLINIC | Age: 63
End: 2023-05-05
Payer: COMMERCIAL

## 2023-05-05 DIAGNOSIS — M79.672 LEFT FOOT PAIN: Primary | ICD-10-CM

## 2023-05-08 ENCOUNTER — THERAPY VISIT (OUTPATIENT)
Dept: PHYSICAL THERAPY | Facility: CLINIC | Age: 63
End: 2023-05-08
Payer: COMMERCIAL

## 2023-05-08 DIAGNOSIS — M79.672 PAIN OF LEFT HEEL: ICD-10-CM

## 2023-05-08 DIAGNOSIS — R26.9 ABNORMAL GAIT: ICD-10-CM

## 2023-05-08 DIAGNOSIS — M72.2 PLANTAR FASCIITIS, LEFT: ICD-10-CM

## 2023-05-08 DIAGNOSIS — M76.62 TENDONITIS, ACHILLES, LEFT: ICD-10-CM

## 2023-05-08 DIAGNOSIS — Q66.6 PES VALGUS OF LEFT FOOT: ICD-10-CM

## 2023-05-08 DIAGNOSIS — Q66.222 METATARSUS ADDUCTUS OF LEFT FOOT: ICD-10-CM

## 2023-05-08 PROCEDURE — 97530 THERAPEUTIC ACTIVITIES: CPT | Mod: GP | Performed by: PHYSICAL THERAPIST

## 2023-05-08 PROCEDURE — 97161 PT EVAL LOW COMPLEX 20 MIN: CPT | Mod: GP | Performed by: PHYSICAL THERAPIST
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
## 2023-05-15 ENCOUNTER — THERAPY VISIT (OUTPATIENT)
Dept: PHYSICAL THERAPY | Facility: CLINIC | Age: 63
End: 2023-05-15
Payer: COMMERCIAL

## 2023-05-15 DIAGNOSIS — R26.9 ABNORMAL GAIT: ICD-10-CM

## 2023-05-15 DIAGNOSIS — M79.672 PAIN OF LEFT HEEL: Primary | ICD-10-CM

## 2023-05-15 PROCEDURE — 97140 MANUAL THERAPY 1/> REGIONS: CPT | Mod: GP | Performed by: PHYSICAL THERAPIST

## 2023-05-15 PROCEDURE — 97110 THERAPEUTIC EXERCISES: CPT | Mod: GP | Performed by: PHYSICAL THERAPIST

## 2023-05-22 ENCOUNTER — VIRTUAL VISIT (OUTPATIENT)
Dept: SURGERY | Facility: CLINIC | Age: 63
End: 2023-05-22
Payer: COMMERCIAL

## 2023-05-22 DIAGNOSIS — E66.01 MORBID OBESITY WITH BMI OF 50.0-59.9, ADULT (H): Primary | ICD-10-CM

## 2023-05-22 PROCEDURE — 97803 MED NUTRITION INDIV SUBSEQ: CPT | Mod: VID

## 2023-05-22 NOTE — LETTER
5/22/2023         RE: Ludmila Sanchez  [address]  [place] [address]        Dear Colleague,    Thank you for referring your patient, Ludmila Sanchez, to the [facility] [place]. Please see a copy of my visit note below.    Ludmila Sanchez is a 62 year old who is being evaluated via a billable video visit.      How would you like to obtain your AVS? MyChart  If the video visit is dropped, the invitation should be resent by: Text to cell phone: [phone]  Will anyone else be joining your video visit? No       Follow Up Surgical Weight Loss Supervised Diet Evaluation    Assessment:  Pt. is being seen today for a follow up RD nutritional evaluation. Pt. has been unsuccessful with non-surgical weight loss methods and is interested in bariatric surgery. Today we reviewed current eating habits and level of physical activity, and instructed on the changes that are required for successful bariatric outcomes.    Surgery of interest per pt: Undecided.    Workflow review:  Support Group: Completed.  Psychology:Completed.  Lab work:Completed.  SWL:No     Weight goal: At or below initial.    Anthropometrics:  Pt's weight is 344 lbs lbs  Initial weight: 339 lbs  Weight change: up 5 lbs,   BMI: 55.7 kg/m2    Ideal body weight: 57 kg (125 lb 10.6 oz)  Adjusted ideal body weight: 95.7 kg (211 lb)  Estimated RMR (Parks-St Jeor equation):  2,100 kcals x 1.2 (sedentary) = 2,490 kcals (for weight maintenance)    Medical History:  Patient Active Problem List   Diagnosis     CARDIOVASCULAR SCREENING; LDL GOAL LESS THAN 160     Mild major depression (H)     Vitamin D deficiency     Vitamin B12 deficiency without anemia     Colonic polyp     Obesity     Cerebral cavernoma     Advanced directives, counseling/discussion     TEODORO (obstructive sleep apnea)     Morbid obesity due to excess calories (H)     Bipolar affective disorder in remission (H)     History of hypertension      Hyperlipidemia LDL goal <100     S/P TKR (total knee replacement)     Pruritus     Acute blood loss anemia     Adenomatous polyp of colon, unspecified part of colon     Pain of left heel     Abnormal gait      Diabetes: no   HbA1c:  5.9 (11/01/22) - prediabetes     Progress over past month: Patient noted she now is wearing a full leg cast d/t her foot pain. Has had weight gain. Noted when she is tired she craves sweets and will eat candy. Doing well with eating smaller meals and chewing food down to smooth consistency. States she is aware this last month did not go well for her and needs some time to practice getting back in her better habits. Will follow up in 2 weeks      Diet Recall/Time  Breakfast: eggs and reed or greek yogurt with fruit   Lunch: chicken pot pie, or soup or hot dog with beans or chicken with rice and vegetable   Dinner: protein shake    Typical Snacks: candy, licorice, gummy bears     Overnight eating: No    Eating out: <1x per week.    Meal duration: 20-30 minutes.      fluids by 30 minutes before, during meal, and waiting 30 minutes after meal before drinking fluids: Yes     Beverages   Tea (hot),  water (has increased significnarl)    Exercise  No routine at this time d/t in a walking boot.    PES statement:   Morbid obesity related knowledge deficit concerning excessive energy/oral intake as evidenced by estimated intake that exceeds estimated daily energy intake; frequent fast food or restaurant intake; and BMI 55.7 kg/m2     Intervention    Nutrition Education:   1. Provided general overview of diet and lifestyle modifications needed to be a deemed a safe candidate for bariatric surgery.   2. Educated patient on how to read a food label: choosing foods with than 10 grams fat and 10 grams sugar per serving to avoid dumping syndrome.    Food/Nutrient Delivery:  3. Educated patient on eating three meals, with cutting out snacking.  4. Educated patient on using a protein powder  drink as a meal replacement and/or supplement after bariatric surgery.   5. Discussed importance of adequate hydration after surgery, with goal of at least 64 oz of fluids/day.  6. Addressed avoiding all carbonated, caffeinated and sweetened drinks to prepare for bariatric surgery.     Nutrition Counselin. Mindful eating techniques: Encouraged slow meal pace, chewing foods to applesauce consistency for 20-30 minutes/meal.   8. Discussed  fluids 30 minutes before, during, and after meal to prevent dumping syndrome and discomfort post bariatric surgery.     Instructions/Goals:     1. Continue implementing bariatric surgery lifestyle modifications.  2. Cut candy out of diet  3. Aim for lean protein choices      Handouts Provided:   150 calore snack list    Monitor/Evaluation:  Pt. s target weight: no gain from initial visit, pt. verbalized understanding.       Plan for next visit:   Follow up in 2 weeks with LYNN     Video-Visit Details    Type of service:  Video Visit    Video Start Time (time video started): 9:29 am    Video End Time (time video stopped): 9:48 am    Originating Location (pt. Location): Home        Distant Location (provider location):  On-site    Mode of Communication:  Video Conference via [facility]    Physician has received verbal consent for a Video Visit from the patient? Yes      Allyn Parsons RD          Again, thank you for allowing me to participate in the care of your patient.        Sincerely,        Allyn Parsons RD

## 2023-05-22 NOTE — PROGRESS NOTES
Ludmila Sanchez is a 62 year old who is being evaluated via a billable video visit.      How would you like to obtain your AVS? MyChart  If the video visit is dropped, the invitation should be resent by: Text to cell phone: [phone]  Will anyone else be joining your video visit? No       Follow Up Surgical Weight Loss Supervised Diet Evaluation    Assessment:  Pt. is being seen today for a follow up RD nutritional evaluation. Pt. has been unsuccessful with non-surgical weight loss methods and is interested in bariatric surgery. Today we reviewed current eating habits and level of physical activity, and instructed on the changes that are required for successful bariatric outcomes.    Surgery of interest per pt: Undecided.    Workflow review:  Support Group: Completed.  Psychology:Completed.  Lab work:Completed.  SWL:No     Weight goal: At or below initial.    Anthropometrics:  Pt's weight is 344 lbs lbs  Initial weight: 339 lbs  Weight change: up 5 lbs,   BMI: 55.7 kg/m2    Ideal body weight: 57 kg (125 lb 10.6 oz)  Adjusted ideal body weight: 95.7 kg (211 lb)  Estimated RMR ([place]-St Jeor equation):  2,100 kcals x 1.2 (sedentary) = 2,490 kcals (for weight maintenance)    Medical History:  Patient Active Problem List   Diagnosis     CARDIOVASCULAR SCREENING; LDL GOAL LESS THAN 160     Mild major depression (H)     Vitamin D deficiency     Vitamin B12 deficiency without anemia     Colonic polyp     Obesity     Cerebral cavernoma     Advanced directives, counseling/discussion     TEODORO (obstructive sleep apnea)     Morbid obesity due to excess calories (H)     Bipolar affective disorder in remission (H)     History of hypertension     Hyperlipidemia LDL goal <100     S/P TKR (total knee replacement)     Pruritus     Acute blood loss anemia     Adenomatous polyp of colon, unspecified part of colon     Pain of left heel     Abnormal gait      Diabetes: no   HbA1c:  5.9 (11/01/22) - prediabetes     Progress over past  month: Patient noted she now is wearing a full leg cast d/t her foot pain. Has had weight gain. Noted when she is tired she craves sweets and will eat candy. Doing well with eating smaller meals and chewing food down to smooth consistency. States she is aware this last month did not go well for her and needs some time to practice getting back in her better habits. Will follow up in 2 weeks      Diet Recall/Time  Breakfast: eggs and reed or greek yogurt with fruit   Lunch: chicken pot pie, or soup or hot dog with beans or chicken with rice and vegetable   Dinner: protein shake    Typical Snacks: candy, licorice, gummy bears     Overnight eating: No    Eating out: <1x per week.    Meal duration: 20-30 minutes.      fluids by 30 minutes before, during meal, and waiting 30 minutes after meal before drinking fluids: Yes     Beverages   Tea (hot),  water (has increased significnarl)    Exercise  No routine at this time d/t in a walking boot.    PES statement:   Morbid obesity related knowledge deficit concerning excessive energy/oral intake as evidenced by estimated intake that exceeds estimated daily energy intake; frequent fast food or restaurant intake; and BMI 55.7 kg/m2     Intervention    Nutrition Education:   1. Provided general overview of diet and lifestyle modifications needed to be a deemed a safe candidate for bariatric surgery.   2. Educated patient on how to read a food label: choosing foods with than 10 grams fat and 10 grams sugar per serving to avoid dumping syndrome.    Food/Nutrient Delivery:  3. Educated patient on eating three meals, with cutting out snacking.  4. Educated patient on using a protein powder drink as a meal replacement and/or supplement after bariatric surgery.   5. Discussed importance of adequate hydration after surgery, with goal of at least 64 oz of fluids/day.  6. Addressed avoiding all carbonated, caffeinated and sweetened drinks to prepare for bariatric surgery.      Nutrition Counselin. Mindful eating techniques: Encouraged slow meal pace, chewing foods to applesauce consistency for 20-30 minutes/meal.   8. Discussed  fluids 30 minutes before, during, and after meal to prevent dumping syndrome and discomfort post bariatric surgery.     Instructions/Goals:     1. Continue implementing bariatric surgery lifestyle modifications.  2. Cut candy out of diet  3. Aim for lean protein choices      Handouts Provided:   150 calore snack list    Monitor/Evaluation:  Pt. s target weight: no gain from initial visit, pt. verbalized understanding.       Plan for next visit:   Follow up in 2 weeks with LYNN     Video-Visit Details    Type of service:  Video Visit    Video Start Time (time video started): 9:29 am    Video End Time (time video stopped): 9:48 am    Originating Location (pt. Location): Home        Distant Location (provider location):  On-site    Mode of Communication:  Video Conference via [facility]    Physician has received verbal consent for a Video Visit from the patient? Yes      Allyn Parsons RD

## 2023-06-07 ENCOUNTER — DOCUMENTATION ONLY (OUTPATIENT)
Dept: SURGERY | Facility: CLINIC | Age: 63
End: 2023-06-07
Payer: COMMERCIAL

## 2023-06-07 VITALS — WEIGHT: 293 LBS | HEIGHT: 66 IN | BODY MASS INDEX: 47.09 KG/M2

## 2023-06-09 ENCOUNTER — VIRTUAL VISIT (OUTPATIENT)
Dept: SURGERY | Facility: CLINIC | Age: 63
End: 2023-06-09
Payer: COMMERCIAL

## 2023-06-09 DIAGNOSIS — E66.01 MORBID OBESITY WITH BMI OF 50.0-59.9, ADULT (H): Primary | ICD-10-CM

## 2023-06-09 PROCEDURE — 97803 MED NUTRITION INDIV SUBSEQ: CPT | Mod: VID

## 2023-06-09 NOTE — PROGRESS NOTES
Ludmila Sanchez is a 62 year old who is being evaluated via a billable video visit.      How would you like to obtain your AVS? MyChart  If the video visit is dropped, the invitation should be resent by: Text to cell phone: [phone]  Will anyone else be joining your video visit? No       Follow Up Surgical Weight Loss Supervised Diet Evaluation    Assessment:  Pt. is being seen today for a follow up RD nutritional evaluation. Pt. has been unsuccessful with non-surgical weight loss methods and is interested in bariatric surgery. Today we reviewed current eating habits and level of physical activity, and instructed on the changes that are required for successful bariatric outcomes.    Surgery of interest per pt: Undecided.    Workflow review:  Support Group: Completed.  Psychology:Completed.  Lab work:Completed.  SWL:No     Weight goal: At or below initial.    Anthropometrics:  Pt's weight is 346  lbs  Initial weight: 339 lbs  Weight change: up 7 lbs,   BMI: 55.9 kg/m2    Ideal body weight: 57 kg (125 lb 10.6 oz)  Adjusted ideal body weight: 95.7 kg (211 lb)  Estimated RMR ([place]-St Jeor equation):  2,100 kcals x 1.2 (sedentary) = 2,490 kcals (for weight maintenance)    Medical History:  Patient Active Problem List   Diagnosis     CARDIOVASCULAR SCREENING; LDL GOAL LESS THAN 160     Mild major depression (H)     Vitamin D deficiency     Vitamin B12 deficiency without anemia     Colonic polyp     Obesity     Cerebral cavernoma     Advanced directives, counseling/discussion     TEODORO (obstructive sleep apnea)     Morbid obesity due to excess calories (H)     Bipolar affective disorder in remission (H)     History of hypertension     Hyperlipidemia LDL goal <100     S/P TKR (total knee replacement)     Pruritus     Acute blood loss anemia     Adenomatous polyp of colon, unspecified part of colon     Pain of left heel     Abnormal gait      Diabetes: no   HbA1c:  5.9 (11/01/22) - prediabetes     Progress over past  month: She is up 3 lbs since our last visit. Patient stated the snack idea list was very helpful to visualize what foods to pair together. Discussed incorporating swimming exercises in her daily routine. Patient is unsure about bariatric surgery and would like to continue with MWM at this time. She noted she is not confident with having surgery anytime soon and would like to try other options.        Diet Recall/Time  Breakfast: eggs and reed or greek yogurt with fruit   Lunch: chicken pot pie, or soup or hot dog with beans or chicken with rice and vegetable   Dinner: triscuts and string cheese    Typical Snacks: protein shake     Overnight eating: No    Eating out: <1x per week.    Meal duration: 20 minutes.      fluids by 30 minutes before, during meal, and waiting 30 minutes after meal before drinking fluids: No - not always      Beverages   Tea (hot),  water (has increased significantly)    Exercise  No routine at this time     PES statement:   Morbid obesity related knowledge deficit concerning excessive energy/oral intake as evidenced by estimated intake that exceeds estimated daily energy intake; frequent fast food or restaurant intake; and BMI 55.9 kg/m2     Intervention    Nutrition Education:   1. Educated patient on how to read a food label: choosing foods with than 10 grams fat and 10 grams sugar per serving     Food/Nutrient Delivery:  2. Educated patient on eating three meals, with cutting out snacking.  3. Educated patient on using a protein powder drink as a meal replacement and/or supplement after bariatric surgery.   4. Discussed importance of adequate hydration after surgery, with goal of at least 64 oz of fluids/day.  5. Addressed avoiding all carbonated, caffeinated and sweetened drinks     Nutrition Counselin. Mindful eating techniques: Encouraged slow meal pace, chewing foods to applesauce consistency for 20-30 minutes/meal.   7. Discussed  fluids 30 minutes before,  during, and after meal to prevent dumping syndrome and discomfort post bariatric surgery.     Instructions/Goals:     1. Patient plans to continue with MWM at this time.  2. Continue increasing lean proteins in her diet.  3. Limit/avoid candy intake/snacking      Handouts Provided:   Quick meal list    Monitor/Evaluation:  Pt. s target weight: no gain from initial visit, pt. verbalized understanding.       Plan for next visit:   Pt will make a follow up with RD after seeding bariatrician.     Video-Visit Details    Type of service:  Video Visit    Video Start Time (time video started): 10:00 am    Video End Time (time video stopped): 10:22 am    Originating Location (pt. Location): Home        Distant Location (provider location):  On-site    Mode of Communication:  Video Conference via [facility]    Physician has received verbal consent for a Video Visit from the patient? Yes      Allyn Parsons RD

## 2023-06-09 NOTE — LETTER
6/9/2023         RE: Ludmila Sanchez  [address]  [place] [address]        Dear Colleague,    Thank you for referring your patient, Ludmila Sanchez, to the [facility] [place]. Please see a copy of my visit note below.    Ludmila Sanchez is a 62 year old who is being evaluated via a billable video visit.      How would you like to obtain your AVS? MyChart  If the video visit is dropped, the invitation should be resent by: Text to cell phone: [phone]  Will anyone else be joining your video visit? No       Follow Up Surgical Weight Loss Supervised Diet Evaluation    Assessment:  Pt. is being seen today for a follow up RD nutritional evaluation. Pt. has been unsuccessful with non-surgical weight loss methods and is interested in bariatric surgery. Today we reviewed current eating habits and level of physical activity, and instructed on the changes that are required for successful bariatric outcomes.    Surgery of interest per pt: Undecided.    Workflow review:  Support Group: Completed.  Psychology:Completed.  Lab work:Completed.  SWL:No     Weight goal: At or below initial.    Anthropometrics:  Pt's weight is 346  lbs  Initial weight: 339 lbs  Weight change: up 7 lbs,   BMI: 55.9 kg/m2    Ideal body weight: 57 kg (125 lb 10.6 oz)  Adjusted ideal body weight: 95.7 kg (211 lb)  Estimated RMR ([place]-St Jeor equation):  2,100 kcals x 1.2 (sedentary) = 2,490 kcals (for weight maintenance)    Medical History:  Patient Active Problem List   Diagnosis     CARDIOVASCULAR SCREENING; LDL GOAL LESS THAN 160     Mild major depression (H)     Vitamin D deficiency     Vitamin B12 deficiency without anemia     Colonic polyp     Obesity     Cerebral cavernoma     Advanced directives, counseling/discussion     TEODORO (obstructive sleep apnea)     Morbid obesity due to excess calories (H)     Bipolar affective disorder in remission (H)     History of hypertension      Hyperlipidemia LDL goal <100     S/P TKR (total knee replacement)     Pruritus     Acute blood loss anemia     Adenomatous polyp of colon, unspecified part of colon     Pain of left heel     Abnormal gait      Diabetes: no   HbA1c:  5.9 (11/01/22) - prediabetes     Progress over past month: She is up 3 lbs since our last visit. Patient stated the snack idea list was very helpful to visualize what foods to pair together. Discussed incorporating swimming exercises in her daily routine. Patient is unsure about bariatric surgery and would like to continue with MWM at this time. She noted she is not confident with having surgery anytime soon and would like to try other options.        Diet Recall/Time  Breakfast: eggs and reed or greek yogurt with fruit   Lunch: chicken pot pie, or soup or hot dog with beans or chicken with rice and vegetable   Dinner: triscuts and string cheese    Typical Snacks: protein shake     Overnight eating: No    Eating out: <1x per week.    Meal duration: 20 minutes.      fluids by 30 minutes before, during meal, and waiting 30 minutes after meal before drinking fluids: No - not always      Beverages   Tea (hot),  water (has increased significantly)    Exercise  No routine at this time     PES statement:   Morbid obesity related knowledge deficit concerning excessive energy/oral intake as evidenced by estimated intake that exceeds estimated daily energy intake; frequent fast food or restaurant intake; and BMI 55.9 kg/m2     Intervention    Nutrition Education:   1. Educated patient on how to read a food label: choosing foods with than 10 grams fat and 10 grams sugar per serving     Food/Nutrient Delivery:  2. Educated patient on eating three meals, with cutting out snacking.  3. Educated patient on using a protein powder drink as a meal replacement and/or supplement after bariatric surgery.   4. Discussed importance of adequate hydration after surgery, with goal of at least 64 oz of  fluids/day.  5. Addressed avoiding all carbonated, caffeinated and sweetened drinks     Nutrition Counselin. Mindful eating techniques: Encouraged slow meal pace, chewing foods to applesauce consistency for 20-30 minutes/meal.   7. Discussed  fluids 30 minutes before, during, and after meal to prevent dumping syndrome and discomfort post bariatric surgery.     Instructions/Goals:     1. Patient plans to continue with MWM at this time.  2. Continue increasing lean proteins in her diet.  3. Limit/avoid candy intake/snacking      Handouts Provided:   Quick meal list    Monitor/Evaluation:  Pt. s target weight: no gain from initial visit, pt. verbalized understanding.       Plan for next visit:   Pt will make a follow up with RD after seeding bariatrician.     Video-Visit Details    Type of service:  Video Visit    Video Start Time (time video started): 10:00 am    Video End Time (time video stopped): 10:22 am    Originating Location (pt. Location): Home        Distant Location (provider location):  On-site    Mode of Communication:  Video Conference via [facility]    Physician has received verbal consent for a Video Visit from the patient? Yes      Allyn Parsons RD        Again, thank you for allowing me to participate in the care of your patient.        Sincerely,        Allyn Parsons RD

## 2023-07-08 ENCOUNTER — TELEPHONE (OUTPATIENT)
Dept: FAMILY MEDICINE | Facility: CLINIC | Age: 63
End: 2023-07-08
Payer: COMMERCIAL

## 2023-07-08 DIAGNOSIS — F31.31 BIPOLAR AFFECTIVE DISORDER, CURRENTLY DEPRESSED, MILD (H): ICD-10-CM

## 2023-07-11 RX ORDER — SERTRALINE HYDROCHLORIDE 100 MG/1
TABLET, FILM COATED ORAL
Qty: 90 TABLET | Refills: 0 | OUTPATIENT
Start: 2023-07-11

## 2023-07-12 ASSESSMENT — PATIENT HEALTH QUESTIONNAIRE - PHQ9: SUM OF ALL RESPONSES TO PHQ QUESTIONS 1-9: 0

## 2023-07-12 NOTE — TELEPHONE ENCOUNTER
I called patient and completed Phq-9 her score is 0. I will route to the refill pool to see if her zoloft can be filled  Meredith Mazariegos CMA

## 2023-07-13 RX ORDER — SERTRALINE HYDROCHLORIDE 100 MG/1
100 TABLET, FILM COATED ORAL DAILY
Qty: 90 TABLET | Refills: 1 | Status: SHIPPED | OUTPATIENT
Start: 2023-07-13 | End: 2024-01-25

## 2023-07-21 ENCOUNTER — VIRTUAL VISIT (OUTPATIENT)
Dept: SURGERY | Facility: CLINIC | Age: 63
End: 2023-07-21
Payer: COMMERCIAL

## 2023-07-21 VITALS — WEIGHT: 293 LBS | HEIGHT: 65 IN | BODY MASS INDEX: 48.82 KG/M2

## 2023-07-21 DIAGNOSIS — R79.89 ABNORMAL TSH: ICD-10-CM

## 2023-07-21 DIAGNOSIS — R79.89 ELEVATED TSH: ICD-10-CM

## 2023-07-21 PROCEDURE — 99214 OFFICE O/P EST MOD 30 MIN: CPT | Mod: VID | Performed by: FAMILY MEDICINE

## 2023-07-21 RX ORDER — PEN NEEDLE, DIABETIC 32GX 5/32"
NEEDLE, DISPOSABLE MISCELLANEOUS
Qty: 100 EACH | Refills: 3 | Status: SHIPPED | OUTPATIENT
Start: 2023-07-21 | End: 2023-11-08

## 2023-07-21 RX ORDER — LEVOTHYROXINE SODIUM 25 UG/1
25 TABLET ORAL DAILY
Qty: 90 TABLET | Refills: 3 | Status: SHIPPED | OUTPATIENT
Start: 2023-07-21 | End: 2024-08-29

## 2023-07-21 NOTE — PROGRESS NOTES
Ludmila Sanchez is 63 year old  female who presents for a billable video visit today.    How would you like to obtain your AVS? MyChart  If dropped from the video visit, the video invitation should be resent by: Send to e-mail at: [email or web address]  Will anyone else be joining your video visit? No      Video Start Time: 11:15AM    Are there any specific questions or needs that you would like addressed at your visit today?     Weight management. Pt is not using any medication right now but does want to talk about injectables again. She is not confident that she wants to have surgery at this time.     Hattie Ventura  [facility]  Surgery Clinic [facility]  [place] [address]  Office: [phone]  Fax: [phone]  Bariatric Follow-up    63 year old  female BMI:Body mass index is 56.58 kg/m .    Weight:   Wt Readings from Last 1 Encounters:   07/21/23 (!) 154.2 kg (340 lb)    pounds    Comorbidities:  Patient Active Problem List   Diagnosis     CARDIOVASCULAR SCREENING; LDL GOAL LESS THAN 160     Mild major depression (H)     Vitamin D deficiency     Vitamin B12 deficiency without anemia     Colonic polyp     Obesity     Cerebral cavernoma     Advanced directives, counseling/discussion     TEODORO (obstructive sleep apnea)     Morbid obesity due to excess calories (H)     Bipolar affective disorder in remission (H)     History of hypertension     Hyperlipidemia LDL goal <100     S/P TKR (total knee replacement)     Pruritus     Acute blood loss anemia     Adenomatous polyp of colon, unspecified part of colon     Pain of left heel     Abnormal gait         Interim: Changing from surgical to non-surgical. Feels it is not the best time for her. Many family demands.  Adult daughter recovering from [place]. Found out she is lactose intolerant. Would like to go back to [facility].  Mother is in the hospital-  age 87-lung thing. Moved to 2-10 from nights. This has been a good change. She has mildly elevated TSH with mildly low Free T4. Thyroid antibodies are negative.     Plan:  DIET  [employer]   EXERCISE continue walking in the pool 3X/wk at [address] senior living   PHARMACOTHERAPY Start Saxenda, ramp up to 3.0mg then change to Wegovy 1.7 then 2.4    introduced 24 week program and will consider that and surgery when things calm down. It is reasonable to start levothyroxine versus rechecking TSH and FT4 in 3 months prior to her next visit given borderline numbers and negative antibodies. She was on thyroid medication in her youth and it was stopped and she has been off since then. She favors starting levothyroxine. Will recheck TSH and Ft4 before our next visit in 3 mo.     -We reviewed her medications and whether associated with weight gain.    Current Outpatient Medications:      insulin pen needle (BD GEMA U/F) 32G X 4 MM miscellaneous, Use 1 pen needles daily or as directed., Disp: 100 each, Rfl: 3     liraglutide - Weight Management (SAXENDA) 18 MG/3ML pen, Inject 0.6 mg Subcutaneous daily for 7 days, THEN 1.2 mg daily for 7 days, THEN 1.8 mg daily for 7 days, THEN 2.4 mg daily for 7 days, THEN 3 mg daily., Disp: 45 mL, Rfl: 3     sertraline (ZOLOFT) 100 MG tablet, Take 1 tablet (100 mg) by mouth daily, Disp: 90 tablet, Rfl: 1      We discussed [facility] Bariatric Basics including:  -eating 3 meals daily  -eating protein first  -eating slowly, chewing food well  -avoiding/limiting calorie containing beverages  -choosing wheat, not white with breads, crackers, pastas, gage, bagels, tortillas, rice  -limiting restaurant or cafeteria eating to twice a week or less  -We discussed the importance of restorative sleep and stress management in maintaining a healthy weight.  -We discussed insulin resistance and glycemic index as it relates to appetite and weight control  -We discussed the National Weight Control Registry  "healthy weight maintenance strategies and ways to optimize metabolism.  -We discussed the importance of physical activity including cardiovascular and strength training in maintaining a healthier weight and explored viable options.    Most recent labs:  Lab Results   Component Value Date    WBC 7.3 08/16/2022    HGB 13.2 08/16/2022    HCT 42.7 08/16/2022    MCV 90 08/16/2022     08/16/2022     Lab Results   Component Value Date    CHOL 196 11/01/2022     Lab Results   Component Value Date    HDL 42 (L) 11/01/2022     Lab Results   Component Value Date    TRIG 126 11/01/2022     Lab Results   Component Value Date    ALT 28 01/26/2023    AST 27 01/26/2023    ALKPHOS 101 01/26/2023       Lab Results   Component Value Date    TSH 5.30 (H) 03/14/2023         DIETARY HISTORY  Meals Per Day: 3  Eating Protein First?: yes  Food Diary: B:eggs 3, tea, veggies or ham cheese L:home made chicken salad with almonds, protein drink, apple with cheese or cheese stick  D:wrap with chicken \"I'm sick of vegetables\"  Snacks Per Day: yes  Typical Snack: apples with cheese, cookie  Fluid Intake: water  Portion Control: problematic  Calorie Containing Beverages: no  Alcohol per week: no  Typical Protein Food Choices: variety  Choosing Whole Grains: sometimes and will going forward  Grocery Shopping is done by: herself  Food Preparation is done by: herself  Meals at Restaurant/Cafeteria/Take Out Per Week: drive through 1/w  Eating at the Table:   TV is Off During Meals:     Positive Changes Since Last Visit: protein with meals  Struggling With: weight loss    Knowledgeable in Reading Food Labels: somewhat  Getting Adequate Protein: likely with protein shake daily at 4;45  Sleeping 7-8 hours/day 7 hours  Stress management high with mother's illness    PHYSICAL ACTIVITY PATTERNS:  Cardiovascular: indoor pool at her apartment 3X/wk walking the pool d/t left heel issue-achilles  Strength Training: see above    REVIEW OF SYSTEMS  As " "above  PHYSICAL EXAM: (most recent vitals and today's stated weight)  Vitals: Ht 1.651 m (5' 5\")   Wt (!) 154.2 kg (340 lb)   LMP 11/01/2011   BMI 56.58 kg/m        GEN: Pleasant and in no acute distress  PSYCH: A&OX3,     I have reviewed the note as documented above.  This accurately captures the substance of my conversation with the patient.  Thank you for the opportunity to participate in the care of your patient.    Georgia Esquivel MD, FAAFP  [facility]  Diplomate, American Board of Obesity Medicine    Total time spent on the date of this encounter doing: chart review, review of test results, patient visit, physical exam, education, counseling, developing plan of care, and documenting = 30 minutes.              Video-Visit Details    Type of service:  Video Visit    Platform used for Video Visit: [employer]    Video End Time (time video stopped): 11:45AM    Originating Location (pt. Location): Home        Distant Location (provider location):  On-site    Distant Location (provider location):  [facility] CL[facility] [place]     "

## 2023-07-21 NOTE — LETTER
7/21/2023         RE: Ludmila Sanchez  [address]  [place] [address]        Dear Colleague,    Thank you for referring your patient, Ludmila Sanchez, to the [facility] SURGERY CLINIC AND BARIATRICS CARE [place]. Please see a copy of my visit note below.    Ludmila Sanchez is 63 year old  female who presents for a billable video visit today.    How would you like to obtain your AVS? MyChart  If dropped from the video visit, the video invitation should be resent by: Send to e-mail at: [email or web address]  Will anyone else be joining your video visit? No      Video Start Time: 11:15AM    Are there any specific questions or needs that you would like addressed at your visit today?     Weight management. Pt is not using any medication right now but does want to talk about injectables again. She is not confident that she wants to have surgery at this time.     Hattie Ventura CMA  [facility]  Surgery Clinic [facility]  Weight Management Clinic [address] [address]  Office: [phone]  Fax: [phone]  Bariatric Follow-up    63 year old  female BMI:Body mass index is 56.58 kg/m .    Weight:   Wt Readings from Last 1 Encounters:   07/21/23 (!) 154.2 kg (340 lb)    pounds    Comorbidities:  Patient Active Problem List   Diagnosis     CARDIOVASCULAR SCREENING; LDL GOAL LESS THAN 160     Mild major depression (H)     Vitamin D deficiency     Vitamin B12 deficiency without anemia     Colonic polyp     Obesity     Cerebral cavernoma     Advanced directives, counseling/discussion     TEODORO (obstructive sleep apnea)     Morbid obesity due to excess calories (H)     Bipolar affective disorder in remission (H)     History of hypertension     Hyperlipidemia LDL goal <100     S/P TKR (total knee replacement)     Pruritus     Acute blood loss anemia     Adenomatous polyp of colon, unspecified part of colon     Pain of left heel      Abnormal gait         Interim: Changing from surgical to non-surgical. Feels it is not the best time for her. Many family demands.  Adult daughter recovering from Bell's Palsey. Found out she is lactose intolerant. [facility]  Mother is in the hospital- age 87-lung thing. Moved to 2-10 from nights. This has been a good change. She has mildly elevated TSH with mildly low Free T4. Thyroid antibodies are negative.     Plan:  DIET  [employer]   EXERCISE continue walking in the pool 3X/wk at 55[facility] senior living   PHARMACOTHERAPY Start Saxenda, ramp up to 3.0mg then change to Wegovy 1.7 then 2.4    introduced 24 week program and will consider that and surgery when things calm down. It is reasonable to start levothyroxine versus rechecking TSH and FT4 in 3 months prior to her next visit given borderline numbers and negative antibodies. She was on thyroid medication in her youth and it was stopped and she has been off since then. She favors starting levothyroxine. Will recheck TSH and Ft4 before our next visit in 3 mo.     -We reviewed her medications and whether associated with weight gain.    Current Outpatient Medications:      insulin pen needle (BD GEMA U/F) 32G X 4 MM miscellaneous, Use 1 pen needles daily or as directed., Disp: 100 each, Rfl: 3     liraglutide - Weight Management (SAXENDA) 18 MG/3ML pen, Inject 0.6 mg Subcutaneous daily for 7 days, THEN 1.2 mg daily for 7 days, THEN 1.8 mg daily for 7 days, THEN 2.4 mg daily for 7 days, THEN 3 mg daily., Disp: 45 mL, Rfl: 3     sertraline (ZOLOFT) 100 MG tablet, Take 1 tablet (100 mg) by mouth daily, Disp: 90 tablet, Rfl: 1      We discussed [facility] Bariatric Basics including:  -eating 3 meals daily  -eating protein first  -eating slowly, chewing food well  -avoiding/limiting calorie containing beverages  -choosing wheat, not white with breads, crackers, pastas, gage, bagels, tortillas, rice  -limiting restaurant or cafeteria eating  "to twice a week or less  -We discussed the importance of restorative sleep and stress management in maintaining a healthy weight.  -We discussed insulin resistance and glycemic index as it relates to appetite and weight control  -We discussed the National Weight Control Registry healthy weight maintenance strategies and ways to optimize metabolism.  -We discussed the importance of physical activity including cardiovascular and strength training in maintaining a healthier weight and explored viable options.    Most recent labs:  Lab Results   Component Value Date    WBC 7.3 08/16/2022    HGB 13.2 08/16/2022    HCT 42.7 08/16/2022    MCV 90 08/16/2022     08/16/2022     Lab Results   Component Value Date    CHOL 196 11/01/2022     Lab Results   Component Value Date    HDL 42 (L) 11/01/2022     Lab Results   Component Value Date    TRIG 126 11/01/2022     Lab Results   Component Value Date    ALT 28 01/26/2023    AST 27 01/26/2023    ALKPHOS 101 01/26/2023       Lab Results   Component Value Date    TSH 5.30 (H) 03/14/2023         DIETARY HISTORY  Meals Per Day: 3  Eating Protein First?: yes  Food Diary: B:eggs 3, tea, veggies or ham cheese L:home made chicken salad with almonds, protein drink, apple with cheese or cheese stick  D:wrap with chicken \"I'm sick of vegetables\"  Snacks Per Day: yes  Typical Snack: apples with cheese, cookie  Fluid Intake: water  Portion Control: problematic  Calorie Containing Beverages: no  Alcohol per week: no  Typical Protein Food Choices: variety  Choosing Whole Grains: sometimes and will going forward  Grocery Shopping is done by: herself  Food Preparation is done by: herself  Meals at Restaurant/Cafeteria/Take Out Per Week: drive through 1/w  Eating at the Table:   TV is Off During Meals:     Positive Changes Since Last Visit: protein with meals  Struggling With: weight loss    Knowledgeable in Reading Food Labels: somewhat  Getting Adequate Protein: likely with protein shake " "daily at 4;45  Sleeping 7-8 hours/day 7 hours  Stress management high with mother's illness    PHYSICAL ACTIVITY PATTERNS:  Cardiovascular: indoor pool at her apartment 3X/wk walking the pool d/t left heel issue-achilles  Strength Training: see above    REVIEW OF SYSTEMS  As above  PHYSICAL EXAM: (most recent vitals and today's stated weight)  Vitals: Ht 1.651 m (5' 5\")   Wt (!) 154.2 kg (340 lb)   LMP 11/01/2011   BMI 56.58 kg/m        GEN: Pleasant and in no acute distress  PSYCH: A&OX3,     I have reviewed the note as documented above.  This accurately captures the substance of my conversation with the patient.  Thank you for the opportunity to participate in the care of your patient.    Georgia Esquivel MD, FAAFP  [facility]  Diplomate, American Board of Obesity Medicine    Total time spent on the date of this encounter doing: chart review, review of test results, patient visit, physical exam, education, counseling, developing plan of care, and documenting = 30 minutes.              Video-Visit Details    Type of service:  Video Visit    Platform used for Video Visit: Optifreeze    Video End Time (time video stopped): 11:45AM    Originating Location (pt. Location): Home        Distant Location (provider location):  On-site    Distant Location (provider location):  [facility] SURGERY [facility] AND BARIATRICS [facility]         Again, thank you for allowing me to participate in the care of your patient.        Sincerely,        Georgia Esquivel MD    "

## 2023-08-01 ENCOUNTER — OFFICE VISIT (OUTPATIENT)
Dept: PODIATRY | Facility: CLINIC | Age: 63
End: 2023-08-01
Payer: COMMERCIAL

## 2023-08-01 VITALS — HEART RATE: 100 BPM | OXYGEN SATURATION: 98 %

## 2023-08-01 DIAGNOSIS — M72.2 PLANTAR FASCIITIS, LEFT: Primary | ICD-10-CM

## 2023-08-01 PROCEDURE — 20550 NJX 1 TENDON SHEATH/LIGAMENT: CPT | Mod: LT | Performed by: PODIATRIST

## 2023-08-01 PROCEDURE — 99213 OFFICE O/P EST LOW 20 MIN: CPT | Mod: 25 | Performed by: PODIATRIST
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
## 2023-08-01 NOTE — PATIENT INSTRUCTIONS
We wish you continued good healing. If you have any questions or concerns, please do not hesitate to contact us at  [phone]    [employer] (secure e-mail communication and access to your chart) to send a message or to make an appointment.    Please remember to call and schedule a follow up appointment if one was recommended at your earliest convenience.     PODIATRY CLINIC HOURS  TELEPHONE NUMBER    Dr. Lxex JAVIERPVINNIE FACFAS        Clinics:  Yadiel May  [facility], [place], ARYAN May/Yadiel  Tuesday 1PM-6PM  [place]  Wednesday 745AM-330PM  [place]  Thursday/Friday 745AM-230PM  [place]   1st and 3rd Mondays  845-430 PM   RAD/YADIEL APPOINTMENTS  [phone]    [place] APPOINTMENTS  [phone]    [place] APPOINTMENTS  [phone]        If you need a medication refill, please contact us you may need lab work and/or a follow up visit prior to your refill (i.e. Antifungal medications).  If MRI needed please call Imaging at [phone]   HOW DO I GET MY KNEE SCOOTER? Knee scooters can be picked up at ANY Medical Supply stores with your knee scooter Prescription.  OR  Bring your signed prescription to an [facility] Medical Equipment showroom.  Call or bring in your Orthotics order to any Orthotics locations. Or call [phone]         STERIOD INJECTIONS  The injection that you received today included a steroid. This is a strong steroid that decreases inflammation, reduces pain, and promotes healing. This type of steroid is different than anabolic steroids abused by body builders and professional athletes.   The injection also included a small dose of local anesthetic. This will result in local numbness for at least a few hours. The initial reduction in pain may simply be the effect of the anesthetic. The steroid will start to work as the local anesthetic is wearing off. It is hard to predict the degree of pain relief or the  duration of benefit from a steroid injection. The injection may need to be repeated depending on your body's response and ongoing pain and problems.   Some people experience a few days of increased pain after a steroid injection. This reaction is partly due to bleeding or bruising immediately after the injection. The localized pain may last for a few days and can be treated with extra strength tylenol, local ice, and decreased activity. The pain should resolve as the steroid starts to take effect which can take up to two weeks. Please do not hesitate to call if you continue having problems beyond what is described above.
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
## 2023-08-09 ENCOUNTER — TELEPHONE (OUTPATIENT)
Dept: SURGERY | Facility: CLINIC | Age: 63
End: 2023-08-09
Payer: COMMERCIAL

## 2023-08-09 NOTE — TELEPHONE ENCOUNTER
Prior Authorization Retail Medication Request    Medication/Dose: Saxenda 18mg/3ml pen  Rationale:  Has used and was approved in the past.    Insurance Name:  [employer]  Insurance ID:  [id number]    Pharmacy Information (if different than what is on RX)  Name:  [facility]  Phone:  [phone]

## 2023-08-11 NOTE — TELEPHONE ENCOUNTER
Central Prior Authorization Team   Phone: [phone]    PA Initiation    Medication: Saxenda 18mg/3ml pen  Insurance Company: [employer] Non-Specialty PA's - Phone [phone] Fax [phone]  Pharmacy Filling the Rx: [id number] [address] [facility]  Filling Pharmacy Phone: [phone]  Filling Pharmacy Fax:    Start Date: 8/11/2023

## 2023-08-14 NOTE — TELEPHONE ENCOUNTER
PRIOR AUTHORIZATION DENIED    Medication: Saxenda 18mg/3ml pen    Denial Date: 8/14/2023    Denial Rational: Medication is a plan exclusion

## 2023-08-15 PROBLEM — E88.810 METABOLIC SYNDROME: Status: ACTIVE | Noted: 2023-08-15

## 2023-08-15 PROBLEM — R73.09 ELEVATED HEMOGLOBIN A1C: Status: ACTIVE | Noted: 2023-08-15

## 2023-08-29 NOTE — PROGRESS NOTES
DISCHARGE  Reason for Discharge: Patient has failed to schedule further appointments.    Equipment Issued:     Discharge Plan: Patient to continue home program.    Referring Provider:  Candis Hyman

## 2023-09-19 ENCOUNTER — PATIENT OUTREACH (OUTPATIENT)
Dept: CARE COORDINATION | Facility: CLINIC | Age: 63
End: 2023-09-19
Payer: COMMERCIAL

## 2023-10-02 ENCOUNTER — PATIENT OUTREACH (OUTPATIENT)
Dept: CARE COORDINATION | Facility: CLINIC | Age: 63
End: 2023-10-02
Payer: COMMERCIAL

## 2023-10-09 ENCOUNTER — VIRTUAL VISIT (OUTPATIENT)
Dept: FAMILY MEDICINE | Facility: CLINIC | Age: 63
End: 2023-10-09
Payer: COMMERCIAL

## 2023-10-09 DIAGNOSIS — J06.9 URI WITH COUGH AND CONGESTION: Primary | ICD-10-CM

## 2023-10-09 PROCEDURE — 99213 OFFICE O/P EST LOW 20 MIN: CPT | Mod: VID | Performed by: PHYSICIAN ASSISTANT

## 2023-10-09 NOTE — PROGRESS NOTES
Ludmila is a 63 year old who is being evaluated via a billable video visit.      How would you like to obtain your AVS? MyChart  If the video visit is dropped, the invitation should be resent by: Text to cell phone: [phone]  Will anyone else be joining your video visit? No          Assessment & Plan   ASSESSMENT/PLAN:      ICD-10-CM    1. URI with cough and congestion  J06.9         Discussed symptomatic measures. Letter written for work. Discussed signs to be seen urgently/to follow up.    AVTAR Garcia [facility]   Ludmila is a 63 year old, presenting for the following health issues:  URI    HPI     Acute Illness  Acute illness concerns: URI  Onset/Duration: Oct 3  Symptoms:  Fever: YES- 99  Chills/Sweats: YES  Headache (location?): YES  Sinus Pressure: YES  Conjunctivitis:  No  Ear Pain: no  Rhinorrhea: No  Congestion: YES  Sore Throat: No  Cough: YES-non-productive  Wheeze: No  Decreased Appetite: No  Nausea: No  Vomiting: No  Diarrhea: No  Dysuria/Freq.: No  Dysuria or Hematuria: No  Fatigue/Achiness: YES  Sick/Strep Exposure: No  Therapies tried and outcome: tylenol, benadryl, Rootology. Has an allergy to sudafed.      Started feeling achey and sluggish Tues, then had temp 99 and felt cold/shaky Wed-Th. Started to have a tight cough Friday.  She had a negative home COVID-19 test    Review of Systems   Other than noted above, general, HEENT, respiratory, cardiac, MS, and gastrointestinal systems are negative.       Objective           Vitals:  No vitals were obtained today due to virtual visit.    Physical Exam   GENERAL: Healthy, alert and no distress  EYES: Eyes grossly normal to inspection.  No discharge or erythema, or obvious scleral/conjunctival abnormalities.  RESP: No audible wheeze, cough, or visible cyanosis.  No visible retractions or increased work of breathing.    SKIN: Visible skin clear. No significant rash, abnormal pigmentation or lesions.  NEURO:  Cranial nerves grossly intact.  Mentation and speech appropriate for age.  PSYCH: Mentation appears normal, affect normal/bright, judgement and insight intact, normal speech and appearance well-groomed.        Video-Visit Details    Type of service:  Video Visit   Video Start Time: 6:58 AM  Video End Time:7:09 AM    Originating Location (pt. Location): Home    Distant Location (provider location):  On-site  Platform used for Video Visit: Kit

## 2023-10-09 NOTE — LETTER
October 9, 2023      Ludmila Sanchez  [address]  [place] [address]        To Whom It May Concern:    Ludmila Sanchez  was seen on 10/9/23.  She missed work 10/3/23 - 10/10/23. Please excuse her  until 10/11/23 due to illness.        Sincerely,        Bibiana Nolan PA-C

## 2023-10-16 ENCOUNTER — PATIENT OUTREACH (OUTPATIENT)
Dept: CARE COORDINATION | Facility: CLINIC | Age: 63
End: 2023-10-16
Payer: COMMERCIAL

## 2023-10-19 ENCOUNTER — OFFICE VISIT (OUTPATIENT)
Dept: URGENT CARE | Facility: URGENT CARE | Age: 63
End: 2023-10-19
Payer: COMMERCIAL

## 2023-10-19 ENCOUNTER — ANCILLARY PROCEDURE (OUTPATIENT)
Dept: GENERAL RADIOLOGY | Facility: CLINIC | Age: 63
End: 2023-10-19
Attending: STUDENT IN AN ORGANIZED HEALTH CARE EDUCATION/TRAINING PROGRAM
Payer: COMMERCIAL

## 2023-10-19 VITALS
TEMPERATURE: 98.7 F | BODY MASS INDEX: 57.74 KG/M2 | RESPIRATION RATE: 18 BRPM | WEIGHT: 293 LBS | SYSTOLIC BLOOD PRESSURE: 138 MMHG | HEART RATE: 98 BPM | OXYGEN SATURATION: 95 % | DIASTOLIC BLOOD PRESSURE: 80 MMHG

## 2023-10-19 DIAGNOSIS — R05.1 ACUTE COUGH: ICD-10-CM

## 2023-10-19 DIAGNOSIS — R05.1 ACUTE COUGH: Primary | ICD-10-CM

## 2023-10-19 DIAGNOSIS — J06.9 VIRAL URI WITH COUGH: ICD-10-CM

## 2023-10-19 PROCEDURE — 71046 X-RAY EXAM CHEST 2 VIEWS: CPT | Mod: TC | Performed by: RADIOLOGY

## 2023-10-19 PROCEDURE — 99213 OFFICE O/P EST LOW 20 MIN: CPT | Performed by: STUDENT IN AN ORGANIZED HEALTH CARE EDUCATION/TRAINING PROGRAM

## 2023-10-19 RX ORDER — PREDNISONE 20 MG/1
40 TABLET ORAL DAILY
Qty: 10 TABLET | Refills: 0 | Status: SHIPPED | OUTPATIENT
Start: 2023-10-19 | End: 2023-10-22

## 2023-10-19 NOTE — PROGRESS NOTES
Assessment & Plan     Viral URI with cough  Acute cough  Discussed in detail differentials and further management. Symptoms are likely secondary to viral infection. CXR is negative for an acute infiltrative process at this time. Recommended well hydration, over-the-counter analgesia, warm fluids and saline gargles. She has been trying some of these supportive therapies with no relief, given the wheezing and minimal improvement will plan for symptomatic treatment with prednisone x 5 days.  Follow up if symptoms persist or worsen. Written instructions/information provided. Patient understood and in agreement with the above plan. All questions are answered.   - XR Chest 2 Views  - predniSONE (DELTASONE) 20 MG tablet  Dispense: 10 tablet; Refill: 0     22 minutes spent by me on the date of the encounter doing chart review, history and exam, documentation and further activities per the note    No follow-ups on file.    Caty Read MD  [facility] [place]    Rashaad Keys is a 63 year old female who presents to clinic today for the following health issues:  Chief Complaint   Patient presents with    Urgent Care    URI     Per patient symptoms have been ongoing since 10/3 cough-dry, chest congestion , SOB, wheezing and chills. Patient has done covid test which was negative      HPI    Cough for the last month. Started with a headache on October 3. Fatigue and has been taking Tylenol. Chest felt really heavy all of a sudden. Dry cough. Now wheezing and short of breath. Mildly decreased hearing. Reports chest pressure. New back pain in the lower back all the way across.     URI Adult    Onset of symptoms was 3 month(s) ago.  Course of illness is worsening.    Severity moderate  Current and Associated symptoms: fever, chills, cough - non-productive, wheezing, shortness of breath, sore throat, hoarse voice, headache, body aches, fatigue, nausea, and diarrhea  Treatment measures tried include  Tylenol/Ibuprofen and cough drops.  Predisposing factors include ill contact: Family member .    Review of Systems  Constitutional, HEENT, cardiovascular, pulmonary, gi and gu systems are negative, except as otherwise noted.      Objective    /80   Pulse 98   Temp 98.7  F (37.1  C) (Tympanic)   Resp 18   Wt (!) 157.4 kg (347 lb)   LMP 11/01/2011   SpO2 95%   BMI 57.74 kg/m    Physical Exam   GENERAL: healthy, alert and no distress  EYES: Eyes grossly normal to inspection, PERRL and conjunctivae and sclerae normal  HENT: normal cephalic/atraumatic, both ears: normal: outside of clear bilateral effusions, no erythema, normal landmarks, nose and mouth without ulcers or lesions, nasal mucosa edematous , rhinorrhea clear, oropharynx clear, oral mucous membranes moist, and sinuses: not tender  NECK: no adenopathy, no asymmetry, masses, or scars and thyroid normal to palpation  RESP: no rales , no rhonchi, expiratory wheezes bilateral and throughout, and prolonged expiratory phase  CV: regular rate and rhythm, normal S1 S2, no S3 or S4, no murmur, click or rub, no peripheral edema and peripheral pulses strong  MS: no gross musculoskeletal defects noted, no edema  SKIN: no suspicious lesions or rashes

## 2023-10-19 NOTE — LETTER
October 19, 2023      Ludmila Sanchez  [address]  [place] [address]        To Whom It May Concern:    Ludmila Sanchez  was seen on 10/19/2023.  Please excuse her  until 10/23/23 due to illness.        Sincerely,        Caty Read MD

## 2023-10-20 DIAGNOSIS — R21 RASH: Primary | ICD-10-CM

## 2023-10-20 RX ORDER — TRIAMCINOLONE ACETONIDE 1 MG/G
CREAM TOPICAL 2 TIMES DAILY
Qty: 80 G | Refills: 1 | Status: SHIPPED | OUTPATIENT
Start: 2023-10-20 | End: 2023-12-10

## 2023-10-22 ENCOUNTER — NURSE TRIAGE (OUTPATIENT)
Dept: NURSING | Facility: CLINIC | Age: 63
End: 2023-10-22
Payer: COMMERCIAL

## 2023-10-22 ENCOUNTER — APPOINTMENT (OUTPATIENT)
Dept: RADIOLOGY | Facility: HOSPITAL | Age: 63
End: 2023-10-22
Attending: EMERGENCY MEDICINE
Payer: COMMERCIAL

## 2023-10-22 ENCOUNTER — HOSPITAL ENCOUNTER (EMERGENCY)
Facility: HOSPITAL | Age: 63
Discharge: HOME | End: 2023-10-22
Attending: EMERGENCY MEDICINE | Admitting: EMERGENCY MEDICINE
Payer: COMMERCIAL

## 2023-10-22 VITALS
HEART RATE: 83 BPM | BODY MASS INDEX: 48.82 KG/M2 | DIASTOLIC BLOOD PRESSURE: 62 MMHG | WEIGHT: 293 LBS | HEIGHT: 65 IN | SYSTOLIC BLOOD PRESSURE: 142 MMHG | TEMPERATURE: 97.5 F | OXYGEN SATURATION: 97 % | RESPIRATION RATE: 18 BRPM

## 2023-10-22 DIAGNOSIS — U07.1 INFECTION DUE TO 2019 NOVEL CORONAVIRUS: ICD-10-CM

## 2023-10-22 DIAGNOSIS — R79.89 ABNORMAL TSH: ICD-10-CM

## 2023-10-22 LAB
ANION GAP SERPL CALCULATED.3IONS-SCNC: 10 MMOL/L (ref 7–15)
BASO+EOS+MONOS # BLD AUTO: NORMAL 10*3/UL
BASO+EOS+MONOS NFR BLD AUTO: NORMAL %
BASOPHILS # BLD AUTO: 0 10E3/UL (ref 0–0.2)
BASOPHILS NFR BLD AUTO: 0 %
BUN SERPL-MCNC: 13.6 MG/DL (ref 8–23)
CALCIUM SERPL-MCNC: 9.3 MG/DL (ref 8.8–10.2)
CHLORIDE SERPL-SCNC: 107 MMOL/L (ref 98–107)
CREAT SERPL-MCNC: 0.85 MG/DL (ref 0.51–0.95)
D DIMER PPP FEU-MCNC: 0.58 UG/ML FEU (ref 0–0.5)
DEPRECATED HCO3 PLAS-SCNC: 24 MMOL/L (ref 22–29)
EGFRCR SERPLBLD CKD-EPI 2021: 76 ML/MIN/1.73M2
EOSINOPHIL # BLD AUTO: 0 10E3/UL (ref 0–0.7)
EOSINOPHIL NFR BLD AUTO: 0 %
ERYTHROCYTE [DISTWIDTH] IN BLOOD BY AUTOMATED COUNT: 13.5 % (ref 10–15)
GLUCOSE SERPL-MCNC: 165 MG/DL (ref 70–99)
HCT VFR BLD AUTO: 41.8 % (ref 35–47)
HGB BLD-MCNC: 13.4 G/DL (ref 11.7–15.7)
IMM GRANULOCYTES # BLD: 0.1 10E3/UL
IMM GRANULOCYTES NFR BLD: 1 %
LYMPHOCYTES # BLD AUTO: 0.9 10E3/UL (ref 0.8–5.3)
LYMPHOCYTES NFR BLD AUTO: 11 %
MCH RBC QN AUTO: 28 PG (ref 26.5–33)
MCHC RBC AUTO-ENTMCNC: 32.1 G/DL (ref 31.5–36.5)
MCV RBC AUTO: 87 FL (ref 78–100)
MONOCYTES # BLD AUTO: 0.3 10E3/UL (ref 0–1.3)
MONOCYTES NFR BLD AUTO: 3 %
NEUTROPHILS # BLD AUTO: 7.2 10E3/UL (ref 1.6–8.3)
NEUTROPHILS NFR BLD AUTO: 85 %
NRBC # BLD AUTO: 0 10E3/UL
NRBC BLD AUTO-RTO: 0 /100
NT-PROBNP SERPL-MCNC: 101 PG/ML (ref 0–900)
PLATELET # BLD AUTO: 222 10E3/UL (ref 150–450)
POTASSIUM SERPL-SCNC: 4.1 MMOL/L (ref 3.4–5.3)
RBC # BLD AUTO: 4.79 10E6/UL (ref 3.8–5.2)
SARS-COV-2 RNA RESP QL NAA+PROBE: POSITIVE
SODIUM SERPL-SCNC: 141 MMOL/L (ref 135–145)
TROPONIN T SERPL HS-MCNC: <6 NG/L
WBC # BLD AUTO: 8.6 10E3/UL (ref 4–11)

## 2023-10-22 PROCEDURE — 87635 SARS-COV-2 COVID-19 AMP PRB: CPT | Performed by: EMERGENCY MEDICINE

## 2023-10-22 PROCEDURE — 71045 X-RAY EXAM CHEST 1 VIEW: CPT

## 2023-10-22 PROCEDURE — 250N000009 HC RX 250: Performed by: EMERGENCY MEDICINE

## 2023-10-22 PROCEDURE — 99284 EMERGENCY DEPT VISIT MOD MDM: CPT | Mod: 25

## 2023-10-22 PROCEDURE — 85379 FIBRIN DEGRADATION QUANT: CPT | Performed by: EMERGENCY MEDICINE

## 2023-10-22 PROCEDURE — 36415 COLL VENOUS BLD VENIPUNCTURE: CPT | Performed by: EMERGENCY MEDICINE

## 2023-10-22 PROCEDURE — 84443 ASSAY THYROID STIM HORMONE: CPT | Performed by: FAMILY MEDICINE

## 2023-10-22 PROCEDURE — 85025 COMPLETE CBC W/AUTO DIFF WBC: CPT | Performed by: EMERGENCY MEDICINE

## 2023-10-22 PROCEDURE — 80048 BASIC METABOLIC PNL TOTAL CA: CPT | Performed by: EMERGENCY MEDICINE

## 2023-10-22 PROCEDURE — 84439 ASSAY OF FREE THYROXINE: CPT | Performed by: FAMILY MEDICINE

## 2023-10-22 PROCEDURE — 83880 ASSAY OF NATRIURETIC PEPTIDE: CPT | Performed by: EMERGENCY MEDICINE

## 2023-10-22 PROCEDURE — 84484 ASSAY OF TROPONIN QUANT: CPT | Performed by: EMERGENCY MEDICINE

## 2023-10-22 PROCEDURE — 94640 AIRWAY INHALATION TREATMENT: CPT

## 2023-10-22 RX ORDER — ALBUTEROL SULFATE 90 UG/1
2 AEROSOL, METERED RESPIRATORY (INHALATION) EVERY 6 HOURS PRN
Qty: 18 G | Refills: 0 | Status: SHIPPED | OUTPATIENT
Start: 2023-10-22 | End: 2023-12-10

## 2023-10-22 RX ORDER — PREDNISONE 10 MG/1
TABLET ORAL
Qty: 18 TABLET | Refills: 0 | Status: SHIPPED | OUTPATIENT
Start: 2023-10-25 | End: 2023-11-03

## 2023-10-22 RX ORDER — IPRATROPIUM BROMIDE AND ALBUTEROL SULFATE 2.5; .5 MG/3ML; MG/3ML
3 SOLUTION RESPIRATORY (INHALATION) ONCE
Status: COMPLETED | OUTPATIENT
Start: 2023-10-22 | End: 2023-10-22

## 2023-10-22 RX ADMIN — IPRATROPIUM BROMIDE AND ALBUTEROL SULFATE 3 ML: .5; 3 SOLUTION RESPIRATORY (INHALATION) at 14:46

## 2023-10-22 ASSESSMENT — ACTIVITIES OF DAILY LIVING (ADL): ADLS_ACUITY_SCORE: 35
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
## 2023-10-22 NOTE — DISCHARGE INSTRUCTIONS
You were seen in the emergency department for shortness of breath.  You did have a confirmatory COVID-19 PCR test which was positive here.  You had extensive additional testing related to your heart and lungs which has looked generally stable otherwise with no signs of severe complication.  We did note some wheezing which seems to have improved with the breathing treatment that you received here.  Based on your duration of illness we think you are unlikely to benefit from antiviral COVID-19 treatments.  We discussed that we think an albuterol inhaler would work similarly for recurrent symptoms at home.  We would suggest continuing the steroid 40 mg until completion and then you can taper off with 30 mg for 3 days, 20 mg for 3 days, 10 mg for 3 days to complete a longer course based on continued symptoms.  We would like you to update your clinic after this ED visit.  Please stay strictly isolated at home.  We would recommend Tylenol 650 mg and ibuprofen 600 mg every 6 hours as needed for pain or fever.    If you have other immediate concerns like severe breathing difficulties at rest, oxygen levels less than 90%, etc. we can reevaluate in the emergency department right away.

## 2023-10-22 NOTE — ED TRIAGE NOTES
Patient arrives with daughter for SOB, wheezing. COVID positive on home test. Denies asthma, COPD hx. Sick  for the last couple of days, breathing significantly worse last night and today. Taking prednisone since Friday without improvement.

## 2023-10-22 NOTE — TELEPHONE ENCOUNTER
Patient has tested positive for Covid.       COVID Positive/Requesting COVID treatment    Patient is positive for COVID and requesting treatment options.    Date of positive COVID test (PCR or at home)? 10/22/2023  Current COVID symptoms: fever or chills, cough, shortness of breath or difficulty breathing, headache, and congestion or runny nose  Date COVID symptoms began: 10/3/2023    Patient running hot and cold but no fever today. Patient reporting wheezing and feeling lightheaded. Patient having some brain fog.   Patient was seen in  on Thursday and started on Prednisone. Patient has had no improvement in symptoms with the prednisone.   Protocol recommends ED now  Patient will have another adult drive to [facility] ED now  If symptoms worsen then patient will call 911 for EMS.  Pushpa Stevenson RN   10/22/23 1:24 PM  [facility] Nurse Advisor    Reason for Disposition   MODERATE difficulty breathing (e.g., speaks in phrases, SOB even at rest, pulse 100-120)    Additional Information   Negative: SEVERE difficulty breathing (e.g., struggling for each breath, speaks in single words)   Negative: Difficult to awaken or acting confused (e.g., disoriented, slurred speech)   Negative: Bluish (or gray) lips or face now   Negative: Shock suspected (e.g., cold/pale/clammy skin, too weak to stand, low BP, rapid pulse)   Negative: Sounds like a life-threatening emergency to the triager   Negative: [1] Diagnosed or suspected COVID-19 AND [2] symptoms lasting 3 or more weeks   Negative: [1] COVID-19 exposure AND [2] no symptoms   Negative: COVID-19 vaccine reaction suspected (e.g., fever, headache, muscle aches) occurring 1 to 3 days after getting vaccine   Negative: COVID-19 vaccine, questions about   Negative: [1] Lives with someone known to have influenza (flu test positive) AND [2] flu-like symptoms (e.g., cough, runny nose, sore throat, SOB; with or without fever)   Negative: [1] Possible COVID-19 symptoms AND [2]  triager concerned about severity of symptoms or other causes   Negative: COVID-19 and breastfeeding, questions about   Negative: SEVERE or constant chest pain or pressure  (Exception: Mild central chest pain, present only when coughing.)    Protocols used: Coronavirus (COVID-19) Diagnosed or [id number]-C-AP

## 2023-10-22 NOTE — ED NOTES
Patient discharged at 1612 to home; family providing transportation. Patient provided with all discharge paperwork and educational material. All questions answered to patient's satisfaction.

## 2023-10-23 ENCOUNTER — MYC MEDICAL ADVICE (OUTPATIENT)
Dept: SURGERY | Facility: CLINIC | Age: 63
End: 2023-10-23
Payer: COMMERCIAL

## 2023-10-23 DIAGNOSIS — R79.89 ABNORMAL TSH: Primary | ICD-10-CM

## 2023-10-23 LAB
T4 FREE SERPL-MCNC: 1.12 NG/DL (ref 0.9–1.7)
TSH SERPL DL<=0.005 MIU/L-ACNC: 0.94 UIU/ML (ref 0.3–4.2)

## 2023-10-23 NOTE — TELEPHONE ENCOUNTER
Dr. Esquivel had wanted a repeat of her TSH and Free T4 prior to her visit with her in November.  Patient just had some labs done and will try to add it to that specimen that was drawn.

## 2023-10-30 ENCOUNTER — OFFICE VISIT (OUTPATIENT)
Dept: FAMILY MEDICINE | Facility: CLINIC | Age: 63
End: 2023-10-30
Payer: COMMERCIAL

## 2023-10-30 VITALS
DIASTOLIC BLOOD PRESSURE: 77 MMHG | HEIGHT: 65 IN | SYSTOLIC BLOOD PRESSURE: 123 MMHG | HEART RATE: 83 BPM | BODY MASS INDEX: 48.82 KG/M2 | TEMPERATURE: 98 F | OXYGEN SATURATION: 97 % | WEIGHT: 293 LBS

## 2023-10-30 DIAGNOSIS — E66.01 MORBID OBESITY DUE TO EXCESS CALORIES (H): ICD-10-CM

## 2023-10-30 DIAGNOSIS — U07.1 INFECTION DUE TO 2019 NOVEL CORONAVIRUS: Primary | ICD-10-CM

## 2023-10-30 DIAGNOSIS — F31.31 BIPOLAR AFFECTIVE DISORDER, CURRENTLY DEPRESSED, MILD (H): ICD-10-CM

## 2023-10-30 PROCEDURE — 99214 OFFICE O/P EST MOD 30 MIN: CPT | Performed by: FAMILY MEDICINE

## 2023-10-30 ASSESSMENT — PAIN SCALES - GENERAL: PAINLEVEL: NO PAIN (0)

## 2023-10-30 NOTE — PROGRESS NOTES
Assessment & Plan       ICD-10-CM    1. Infection due to 2019 novel coronavirus  U07.1       2. Morbid obesity due to excess calories (H)  E66.01       3. Bipolar affective disorder, currently depressed, mild (H)  F31.31         I think it would be reasonable to have her remain off work this week yet, especially given her underlying comorbidities including morbid obesity, and plan to return to work next Monday, November 6  I therefore filled out [facility] paperwork accordingly for her  She will finish out the prednisone  Use albuterol as needed  We will follow her expectantly  Follow-up on a prn basis      30 minutes spent by me on the date of the encounter doing chart review, history and exam, documentation and further activities per the note     MED REC REQUIRED  Post Medication Reconciliation Status: discharge medications reconciled, continue medications without change          Sidney Lockwood MD  [facility] is a 63 year old, presenting for the following health issues:  ER F/U      10/30/2023     9:25 AM   Additional Questions   Roomed by cam   Accompanied by none         10/30/2023     9:25 AM   Patient Reported Additional Medications   Patient reports taking the following new medications none       HPI       ED/UC Followup:    Facility:  [facility]  Date of visit: 10/22/23  Reason for visit: URI and tested pos for covid  Current Status: still has a cough    She was seen initially on October 19 and felt to have a viral URI.  She was prescribed a 5-day course of prednisone.  She presented to the ER 3 days later with worsening symptoms including cough and shortness of breath.  She was diagnosed with a COVID infection at that time.  Her prednisone treatment course was extended and tapered as per the note.  She was prescribed an albuterol inhaler.  She feels like she is slowly getting better, but she is still coughing and feeling fatigued.  She brings in [facility] paperwork to be  "completed.  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  She does have underlying morbid obesity, bipolar illness, and other conditions as per her chart.    Patient Active Problem List   Diagnosis    Mild major depression (H)    Vitamin D deficiency    Vitamin B12 deficiency without anemia    Colonic polyp    Obesity    Cerebral cavernoma    Advanced directives, counseling/discussion    TEODORO (obstructive sleep apnea)    Morbid obesity due to excess calories (H)    Bipolar affective disorder in remission (H24)    History of hypertension    Hyperlipidemia LDL goal <100    S/P TKR (total knee replacement)    Pruritus    Acute blood loss anemia    Adenomatous polyp of colon, unspecified part of colon    Pain of left heel    Abnormal gait    Elevated hemoglobin A1c    Metabolic syndrome     Current Outpatient Medications   Medication    albuterol (PROAIR HFA/PROVENTIL HFA/VENTOLIN HFA) 108 (90 Base) MCG/ACT inhaler    levothyroxine (SYNTHROID/LEVOTHROID) 25 MCG tablet    predniSONE (DELTASONE) 10 MG tablet    sertraline (ZOLOFT) 100 MG tablet    insulin pen needle (BD GEMA U/F) 32G X 4 MM miscellaneous    liraglutide - Weight Management (SAXENDA) 18 MG/3ML pen    triamcinolone (KENALOG) 0.1 % external cream     No current facility-administered medications for this visit.           Review of Systems   Mainly significant for the above.      Objective    /77 (BP Location: Right arm, Patient Position: Sitting, Cuff Size: Adult Large)   Pulse 83   Temp 98  F (36.7  C) (Temporal)   Ht 1.651 m (5' 5\")   Wt (!) 157.4 kg (347 lb)   LMP 11/01/2011   SpO2 97%   BMI 57.74 kg/m    Body mass index is 57.74 kg/m .  Physical Exam   GENERAL: alert, no distress, and obese  HENT: Grossly normal  NECK: Supple  RESP: lungs clear to auscultation - no rales, rhonchi or wheezes      Office note from October 19 and " ER note from 1022 were reviewed.

## 2023-10-30 NOTE — NURSING NOTE
Disability form filled out and original given to patient and copy sent to abstracting for our records.  JIMMY Vaz MA

## 2023-11-07 ENCOUNTER — TELEPHONE (OUTPATIENT)
Dept: FAMILY MEDICINE | Facility: CLINIC | Age: 63
End: 2023-11-07
Payer: COMMERCIAL

## 2023-11-07 NOTE — TELEPHONE ENCOUNTER
Refaxed form to [employer]. Spoke to patient ton the phone. Printed copy and from media and sent left copy at  for  per patient request.         Twyla Lucas -    [facility]

## 2023-11-07 NOTE — TELEPHONE ENCOUNTER
Patient called said [employer] never received her paper work please re-fax and patient would like to  a copy also. Please call when ready.  Cata Novak   [facility]

## 2023-11-08 ENCOUNTER — VIRTUAL VISIT (OUTPATIENT)
Dept: SURGERY | Facility: CLINIC | Age: 63
End: 2023-11-08
Payer: COMMERCIAL

## 2023-11-08 VITALS — WEIGHT: 293 LBS | BODY MASS INDEX: 57.74 KG/M2

## 2023-11-08 DIAGNOSIS — R73.09 ELEVATED GLUCOSE: ICD-10-CM

## 2023-11-08 DIAGNOSIS — E66.01 MORBID OBESITY DUE TO EXCESS CALORIES (H): Primary | ICD-10-CM

## 2023-11-08 DIAGNOSIS — E78.5 DYSLIPIDEMIA: ICD-10-CM

## 2023-11-08 DIAGNOSIS — R63.8 ABNORMAL CRAVING: ICD-10-CM

## 2023-11-08 PROCEDURE — 99214 OFFICE O/P EST MOD 30 MIN: CPT | Mod: VID | Performed by: FAMILY MEDICINE

## 2023-11-08 RX ORDER — PHENTERMINE HYDROCHLORIDE 37.5 MG/1
18.75-37.5 TABLET ORAL
Qty: 90 TABLET | Refills: 1 | Status: SHIPPED | OUTPATIENT
Start: 2023-11-08 | End: 2024-05-06

## 2023-11-08 RX ORDER — METFORMIN HCL 500 MG
500 TABLET, EXTENDED RELEASE 24 HR ORAL 2 TIMES DAILY WITH MEALS
Qty: 180 TABLET | Refills: 3 | Status: SHIPPED | OUTPATIENT
Start: 2023-11-08 | End: 2024-09-30

## 2023-11-08 ASSESSMENT — PAIN SCALES - GENERAL: PAINLEVEL: NO PAIN (0)

## 2023-11-08 NOTE — PATIENT INSTRUCTIONS
2 WEEKS  PRE SURGERY    FULL LIQUID DIET MATERIALS    SURGERY DATE:    DIET START DATE:  CLEAR LIQUIDS ON:                      2 Week Pre-Surgery Full Liquid Diet    Congratulations! Your surgery is scheduled and soon you will be starting your 2-week liquid diet.  This is a calorie controlled, high protein, and low carbohydrate meal plan.  The goal in the next 2 weeks is rapid weight loss! On average women should expect to lose ~10-12 lbs and men 12-16 lbs over the next 2 weeks.    Diet Reason:  This diet is to help reduce the size of your liver to make the procedure easier for the surgeons and reduce your risk of complications.    Hunger: The first 3-4 days on this diet are the most difficult--try to keep busy during these first days! During this time your body is using up the remaining carbohydrates stores in your body for fuel.  After the first few days on full liquids, your body will be primarily burning fat for energy and you will likely experience minimal physical hunger.    Protein:  Your goal is to drink 1 protein shake at each of your 3 meals.  Not eating enough protein or calories as recommended can increase dizziness, fatigue and hunger.    Ready to Drink Protein Shakes:  These products are commercially prepared and are ready to drink right off the shelf.  Advantage: No mixing, blending, or measuring. There are a variety of flavors to choose from.  Disadvantage: They generally cost about $5.00-$6.00 per case of 4 drinks    Protein Powders: These products can be used to make protein supplements. Protein powders can be mixed with skim milk (limit to 16 ounces or less per day) or water and Crystal Light type beverages.  Advantage: lower in cost per serving over Ready to Drink Protein Shakes  Disadvantage: Buying in bulk limits the variety of flavors.    Fluid Goals:  In addition to full liquid diet, aim to drink 48-64 ounces of water or other calorie-free liquids per day!  There is no need to separate  fluids from meals by 30 minutes because meals are all liquids.  Look for (per serving):  15-30 grams protein   Less than 10 grams total carbohydrate  Protein Supplements        Product Type Serving Calories Protein Grams Sugar Grams Where Available   Premier Protein Shake 1 can 160 30 1 [employer]co   AdvantEdge Carb (EAS) Shake 1 can 110 17 0 [employer], Target, Grocery/Pharmacy   Pure Protein Shake 1 can 110 20 1 Target,  Joes   Slim Fast   High Protein Shake 1 can 190 20 1 [employer], Target, Grocery/Pharmacy   Atkins Advantage Shake 1 can 160 15-17 1 [employer], Target, Grocery/Pharmacy   Isopure Zero Carb Juice   bottle 80 20 0 GN, vitamin shoppe, online   Muscle Milk Light Shake 1 can 110 15 0 [employer], Target, [facility], Grocery/Pharmacy    Whey Shake 1 bottle 100 18 2 www.designerwhey.com   Unjury Protein Powder 1 scoop 90 20 0 wwwAMCAD  wwwPlayer X  4-197-569-0519   Haider Nutrition (gluten-free) Powder 1 scoop 180 27 2 www.DropGiftskenutrition.com   [place] Protein Powder  (fruit-flavors) 1 scoop or packet 90 23 0 www.dietdirect.com  www.bariatriceating.com  [employer], Vitamin Shoppe    Whey Powder 1 scoop 100 18 2 Target, [facility], [facility], wwwCOTA Track   Myoplex Lite Powder 1 scoop 180 25 1 www.eas.com  GN, [employer]/Costco  [employer]   Yony Mihir Whey Protein Powder Powder 1 scoop 110 25 0 GN, Vitamin Shoppe, etc       2 Week Grocery List and Portion Guide    Group A    cup Cream of Wheat or Cream of Rice (cooked)     cup Plain Malt-O-Meal (cooked)     cup Plain Grits (cooked)   *May add Splenda, spray margarine and/or spices like cinnamon and nutmeg to add flavor  *If you dislike hot cereals, you may replace Group A with   cup No Sugar Added Applesauce OR 1 Group F.    Group B  Protein Shake: 100-190 calories, 15-30 g Protein, less than 5-10 g carbohydrate  (see chart for examples)   Powdered Protein: 100-125 calories, 15-30 g Protein, less than 5-10 grams carbohydrate  (see chart for  examples)     Group C  1 Cup Serving: Tomato Soup (made with skim milk or water)   1 Cup Serving: Low Fat/Light Cream Soup  Such as: Cream of Chicken, Cream of Broccoli, Cream of Celery, Cream of Mushroom   1 Cup Serving: Meat and Vegetable Soup  (Avoid soups with potato, pasta, rice, corn, beans, dumplings and barley as main ingredient)   *Soups should contain less than 15grams of carbohydrates per 1 cup serving.  Group D    cup ( 4 oz) of Sugar Free Pudding     cup ( 4 oz) of Sugar Free Jell-O or 1 Sugar Free Popsicle     cup ( 4 oz) of No Sugar Added Applesauce     Group E- (optional)    cup ( 4 oz)  of 100% Fruit Juice     cup ( 6 oz) of Diet V8 Splash or Light V8 Fusion   1 cup ( 8 oz) of Tomato Juice, Low Sodium or Regular V8 Tomato Juice   *If choosing not to drink juice, may replace Group E with   cup No Sugar Added Applesauce.     Group F  6 oz cup: Light 100 Calorie Yogurt or Nonfat Plain Greek Yogurt  Flavors without Fruit Chunks: Vanilla, Lemon, Banana, Key Lime Pie, [place] Cream Pie     *If mixing protein shake or making soup with milk:   Skim/1% Milk or Plain Soy Milk--limit to 16 oz. or less per day   MEAL PLAN:    2 WEEK PRE-SURGIAL BARIATRIC FULL LIQUID DIET    Breakfast:    1 Group A  1 Group B  1 Group E      Lunch:    1 Group B  1 Group C  1 Group D      Dinner:    1 Group B  1 Group C  1 Group D      Evening Snack:  1 Group F      *Remember to drink water or other calorie-free beverages and aim for 48-64 ounces of fluid per day in addition to full or clear liquid diet     Calorie Goals:   ~ 900-1000 calories per day   Protein Goals:   ~ 60-80 grams of protein per day    DAY BEFORE SURGERY: CLEAR LIQUID DIET  Date:    Sip on these liquids all day long the day before surgery to optimize hydration!  Water   0-Calorie Propel or Vitamin Water Zero   Diluted fruit juice (50% juice/50% water)   Crystal Light, Sugar Free David Aid, SoBe Life Water or other calorie free beverages   Regular or No Salt  Added Chicken, Vegetable, or Beef Broth   Sugar-Free Jell-O, Sugar-Free Popsicle (Avoid Deep Red and Purple Pigments)     NO PROTEIN SUPPLEMENTS!!    SAMPLE MEAL PLAN:  DAY BEFORE SURGERY CLEAR LIQUID DIET  Breakfast  1 cup of chicken, vegetable or beef broth  8 ounces 100% fruit juice diluted (50% juice/50% water)    Lunch  1 cup of chicken, vegetable or beef broth    cup of sugar free Jell-O  Sugar-free Popsicle    Dinner  1 cup of chicken, vegetable or beef broth    cup of sugar free Jell-O or 1 sugar-free Popsicle    Evening Snack    cup of sugar free Jell-O or 1 sugar free Popsicle

## 2023-11-08 NOTE — PROGRESS NOTES
Virtual Visit Details    Type of service:  Video Visit   Video Start Time:  11:30  Video End Time: 12:00    Originating Location (pt. Location): Home    Distant Location (provider location):  On-site  Platform used for Video Visit: Jac Dorsey

## 2023-11-08 NOTE — NURSING NOTE
Is the patient currently in the state of [place]? YES    Visit mode:VIDEO    If the visit is dropped, the patient can be reconnected by: VIDEO VISIT: Text to cell phone:   Telephone Information:   Mobile [phone]       Will anyone else be joining the visit? NO  (If patient encounters technical issues they should call [phone]    How would you like to obtain your AVS? MyChart    Are changes needed to the allergy or medication list? Pt stated no changes to allergies and Pt stated no med changes    Reason for visit: Follow Up    Xochitl OLIVARES

## 2023-11-08 NOTE — PROGRESS NOTES
Bariatric Follow-up    63 year old  female BMI:Body mass index is 57.74 kg/m .    Weight:   Wt Readings from Last 1 Encounters:   11/08/23 (!) 157.4 kg (347 lb)    pounds    Comorbidities:  Patient Active Problem List   Diagnosis    Mild major depression (H)    Vitamin D deficiency    Vitamin B12 deficiency without anemia    Colonic polyp    Obesity    Cerebral cavernoma    Advanced directives, counseling/discussion    TEODORO (obstructive sleep apnea)    Morbid obesity due to excess calories (H)    Bipolar affective disorder in remission (H24)    History of hypertension    Hyperlipidemia LDL goal <100    S/P TKR (total knee replacement)    Pruritus    Acute blood loss anemia    Adenomatous polyp of colon, unspecified part of colon    Pain of left heel    Abnormal gait    Elevated hemoglobin A1c    Metabolic syndrome         Interim: Just had COVID for 2 weeks. Home for 2.5 weeks. Sugar cravings. Trying to get 3 meals, using a protein drink. Could not afford Saxenda. When tired, craves Good and Plenty Candy instead of taking a nap. Has Medicare now and Silver Sneakers    Plan:  DIET  Cautioned about restricting in the am and encouraged more protein in the am. Wheat not white. Try to not buy Good and Plenties   EXERCISE continue walking. Count your steps   PHARMACOTHERAPY Start phentermine 1/2 tab in the am and metformin one daily. Will follow up and consider increasing    encouraged RD guidance. Will try the 2 week liquid diet to curb sugar cravings and portion sizes. Protocol provided. States she is tolerant to caffeine and low dose (8mg) phentermine may not be helpful.     -We reviewed her medications and whether associated with weight gain.    Current Outpatient Medications:     metFORMIN (GLUCOPHAGE XR) 500 MG 24 hr tablet, Take 1 tablet (500 mg) by mouth 2 times daily (with meals), Disp: 180 tablet, Rfl: 3    phentermine (ADIPEX-P) 37.5 MG tablet, Take 0.5-1 tablets (18.75-37.5 mg) by mouth every morning  (before breakfast) for 180 days, Disp: 90 tablet, Rfl: 1    albuterol (PROAIR HFA/PROVENTIL HFA/VENTOLIN HFA) 108 (90 Base) MCG/ACT inhaler, Inhale 2 puffs into the lungs every 6 hours as needed for shortness of breath, wheezing or cough, Disp: 18 g, Rfl: 0    levothyroxine (SYNTHROID/LEVOTHROID) 25 MCG tablet, Take 1 tablet (25 mcg) by mouth daily, Disp: 90 tablet, Rfl: 3    sertraline (ZOLOFT) 100 MG tablet, Take 1 tablet (100 mg) by mouth daily, Disp: 90 tablet, Rfl: 1    triamcinolone (KENALOG) 0.1 % external cream, APPLY TO AFFECTED AREA TWICE A DAY (Patient not taking: Reported on 10/30/2023), Disp: 80 g, Rfl: 1      We discussed [facility] Bariatric Basics including:  -eating 3 meals daily  -eating protein first  -eating slowly, chewing food well  -avoiding/limiting calorie containing beverages  -choosing wheat, not white with breads, crackers, pastas, gage, bagels, tortillas, rice  -limiting restaurant or cafeteria eating to twice a week or less  -We discussed the importance of restorative sleep and stress management in maintaining a healthy weight.  -We discussed insulin resistance and glycemic index as it relates to appetite and weight control  -We discussed the National Weight Control Registry healthy weight maintenance strategies and ways to optimize metabolism.  -We discussed the importance of physical activity including cardiovascular and strength training in maintaining a healthier weight and explored viable options.    Most recent labs:  Lab Results   Component Value Date    WBC 8.6 10/22/2023    HGB 13.4 10/22/2023    HCT 41.8 10/22/2023    MCV 87 10/22/2023     10/22/2023     Lab Results   Component Value Date    CHOL 196 11/01/2022     Lab Results   Component Value Date    HDL 42 (L) 11/01/2022       Lab Results   Component Value Date    TRIG 126 11/01/2022     Lab Results   Component Value Date    ALT 28 01/26/2023    AST 27 01/26/2023    ALKPHOS 101 01/26/2023       Lab Results    Component Value Date    TSH 0.94 10/22/2023       DIETARY HISTORY  Meals Per Day: 3  Eating Protein First?: yes  Food Diary: B:egg L:meat and veggie or casserole D:at work protein drink   Snacks Per Day: yes  Typical Snack: I love pie. Bakes a pie.  Fluid Intake: intentional. Water and Coke 20 oz  Portion Control: problematic with sugars  Calorie Containing Beverages: Coke,   Alcohol per week: no  Typical Protein Food Choices: egg, burger, cottage cheese, chicken, protein drink (premier protein)  Choosing Whole Grains: sometimes  Grocery Shopping is done by: herself  Food Preparation is done by: herself  Meals at Restaurant/Cafeteria/Take Out Per Week: 0-1  Eating at the Table:   TV is Off During Meals:     Positive Changes Since Last Visit: walking again halls at work  Struggling With: sugar cravings    Knowledgeable in Reading Food Labels: yes  Getting Adequate Protein: yes  Sleeping 7-8 hours/day on and off  Stress management high-her mother who is 88 and is caretaker. Grandchildren call on her for help    PHYSICAL ACTIVITY PATTERNS:  Cardiovascular: walking   Strength Training: no    REVIEW OF SYSTEMS    PHYSICAL EXAM: (most recent vitals and today's stated weight)  Vitals: Wt (!) 157.4 kg (347 lb)   LMP 11/01/2011   BMI 57.74 kg/m        GEN: Pleasant and in no acute distress  PSYCH: A&OX3,     I have reviewed the note as documented above.  This accurately captures the substance of my conversation with the patient.  Thank you for the opportunity to participate in the care of your patient.    Georgia Esquivel MD, FAAFP  [facility]  Diplomate, American Board of Obesity Medicine    Total time spent on the date of this encounter doing: chart review, review of test results, patient visit, physical exam, education, counseling, developing plan of care, and documenting = 30 minutes.

## 2023-11-08 NOTE — LETTER
11/8/2023         RE: Ludmila Sanchez  [address]  [place] [address]        Dear Colleague,    Thank you for referring your patient, Ludmila Sanchez, to the [facility] AND [facility]ATRICS CARE [place]. Please see a copy of my visit note below.    Bariatric Follow-up    63 year old  female BMI:Body mass index is 57.74 kg/m .    Weight:   Wt Readings from Last 1 Encounters:   11/08/23 (!) 157.4 kg (347 lb)    pounds    Comorbidities:  Patient Active Problem List   Diagnosis     Mild major depression (H)     Vitamin D deficiency     Vitamin B12 deficiency without anemia     Colonic polyp     Obesity     Cerebral cavernoma     Advanced directives, counseling/discussion     TEODORO (obstructive sleep apnea)     Morbid obesity due to excess calories (H)     Bipolar affective disorder in remission (H24)     History of hypertension     Hyperlipidemia LDL goal <100     S/P TKR (total knee replacement)     Pruritus     Acute blood loss anemia     Adenomatous polyp of colon, unspecified part of colon     Pain of left heel     Abnormal gait     Elevated hemoglobin A1c     Metabolic syndrome         Interim: Just had COVID for 2 weeks. Home for 2.5 weeks. Sugar cravings. Trying to get 3 meals, using a protein drink. Could not afford Saxenda. When tired, craves Good and Plenty Candy instead of taking a nap. Has Medicare now and [employer]    Plan:  DIET  Cautioned about restricting in the am and encouraged more protein in the am. Wheat not white. Try to not buy Good and Plenties   EXERCISE continue walking. Count your steps   PHARMACOTHERAPY Start phentermine 1/2 tab in the am and metformin one daily. Will follow up and consider increasing    encouraged RD guidance. Will try the 2 week liquid diet to curb sugar cravings and portion sizes. Protocol provided. States she is tolerant to caffeine and low dose (8mg) phentermine may not be helpful.     -We reviewed her medications and whether  associated with weight gain.    Current Outpatient Medications:      metFORMIN (GLUCOPHAGE XR) 500 MG 24 hr tablet, Take 1 tablet (500 mg) by mouth 2 times daily (with meals), Disp: 180 tablet, Rfl: 3     phentermine (ADIPEX-P) 37.5 MG tablet, Take 0.5-1 tablets (18.75-37.5 mg) by mouth every morning (before breakfast) for 180 days, Disp: 90 tablet, Rfl: 1     albuterol (PROAIR HFA/PROVENTIL HFA/VENTOLIN HFA) 108 (90 Base) MCG/ACT inhaler, Inhale 2 puffs into the lungs every 6 hours as needed for shortness of breath, wheezing or cough, Disp: 18 g, Rfl: 0     levothyroxine (SYNTHROID/LEVOTHROID) 25 MCG tablet, Take 1 tablet (25 mcg) by mouth daily, Disp: 90 tablet, Rfl: 3     sertraline (ZOLOFT) 100 MG tablet, Take 1 tablet (100 mg) by mouth daily, Disp: 90 tablet, Rfl: 1     triamcinolone (KENALOG) 0.1 % external cream, APPLY TO AFFECTED AREA TWICE A DAY (Patient not taking: Reported on 10/30/2023), Disp: 80 g, Rfl: 1      We discussed [facility] Bariatric Basics including:  -eating 3 meals daily  -eating protein first  -eating slowly, chewing food well  -avoiding/limiting calorie containing beverages  -choosing wheat, not white with breads, crackers, pastas, gage, bagels, tortillas, rice  -limiting restaurant or cafeteria eating to twice a week or less  -We discussed the importance of restorative sleep and stress management in maintaining a healthy weight.  -We discussed insulin resistance and glycemic index as it relates to appetite and weight control  -We discussed the National Weight Control Registry healthy weight maintenance strategies and ways to optimize metabolism.  -We discussed the importance of physical activity including cardiovascular and strength training in maintaining a healthier weight and explored viable options.    Most recent labs:  Lab Results   Component Value Date    WBC 8.6 10/22/2023    HGB 13.4 10/22/2023    HCT 41.8 10/22/2023    MCV 87 10/22/2023     10/22/2023     Lab Results    Component Value Date    CHOL 196 11/01/2022     Lab Results   Component Value Date    HDL 42 (L) 11/01/2022       Lab Results   Component Value Date    TRIG 126 11/01/2022     Lab Results   Component Value Date    ALT 28 01/26/2023    AST 27 01/26/2023    ALKPHOS 101 01/26/2023       Lab Results   Component Value Date    TSH 0.94 10/22/2023       DIETARY HISTORY  Meals Per Day: 3  Eating Protein First?: yes  Food Diary: B:egg L:meat and veggie or casserole D:at work protein drink   Snacks Per Day: yes  Typical Snack: I love pie. Bakes a pie.  Fluid Intake: intentional. Water and Coke 20 oz  Portion Control: problematic with sugars  Calorie Containing Beverages: Coke,   Alcohol per week: no  Typical Protein Food Choices: egg, burger, cottage cheese, chicken, protein drink (premier protein)  Choosing Whole Grains: sometimes  Grocery Shopping is done by: herself  Food Preparation is done by: herself  Meals at Restaurant/Cafeteria/Take Out Per Week: 0-1  Eating at the Table:   TV is Off During Meals:     Positive Changes Since Last Visit: walking again halls at work  Struggling With: sugar cravings    Knowledgeable in Reading Food Labels: yes  Getting Adequate Protein: yes  Sleeping 7-8 hours/day on and off  Stress management high-her mother who is 88 and is caretaker. Grandchildren call on her for help    PHYSICAL ACTIVITY PATTERNS:  Cardiovascular: walking   Strength Training: no    REVIEW OF SYSTEMS    PHYSICAL EXAM: (most recent vitals and today's stated weight)  Vitals: Wt (!) 157.4 kg (347 lb)   LMP 11/01/2011   BMI 57.74 kg/m        GEN: Pleasant and in no acute distress  PSYCH: A&OX3,     I have reviewed the note as documented above.  This accurately captures the substance of my conversation with the patient.  Thank you for the opportunity to participate in the care of your patient.    Georgia Esquivel MD, FAAFP  [facility]  Diplomate, American Board of Obesity Medicine    Total time  spent on the date of this encounter doing: chart review, review of test results, patient visit, physical exam, education, counseling, developing plan of care, and documenting = 30 minutes.        Virtual Visit Details    Type of service:  Video Visit   Video Start Time:  11:30  Video End Time: 12:00    Originating Location (pt. Location): Home    Distant Location (provider location):  On-site  Platform used for Video Visit: Jac Shoemaker, Jac Muller      Again, thank you for allowing me to participate in the care of your patient.        Sincerely,        Georgia Esquivel MD

## 2023-12-08 ENCOUNTER — VIRTUAL VISIT (OUTPATIENT)
Dept: SURGERY | Facility: CLINIC | Age: 63
End: 2023-12-08
Payer: COMMERCIAL

## 2023-12-08 DIAGNOSIS — E66.01 MORBID OBESITY WITH BMI OF 50.0-59.9, ADULT (H): Primary | ICD-10-CM

## 2023-12-08 NOTE — LETTER
"    12/8/2023         RE: Ludmila Sanchez  [address]  [place] [address]        Dear Colleague,    Thank you for referring your patient, Ludmila Sanchez, to the [facility] SURGERY CLINIC AND BARIATRICS CARE [place]. Please see a copy of my visit note below.    Ludmila Sanchez is a 62 year old who is being evaluated via a billable video visit.      How would you like to obtain your AVS? MyChart  If the video visit is dropped, the invitation should be resent by: Text to cell phone: [phone]  Will anyone else be joining your video visit? No     Patient was advised that Medicare may not pay for this nutrition consult today. \"If Medicare doesn't pay for services, you may have to pay. Medicare does not pay for everything, even some care that you or your health care provider have good reason to think you need. We expect that Medicare may not pay for the visit today.\"     Patient declines visit at this time.       Again, thank you for allowing me to participate in the care of your patient.        Sincerely,        Allyn Parsons RD  "

## 2023-12-08 NOTE — PROGRESS NOTES
"Ludmila Sanchez is a 62 year old who is being evaluated via a billable video visit.      How would you like to obtain your AVS? MyChart  If the video visit is dropped, the invitation should be resent by: Text to cell phone: [phone]  Will anyone else be joining your video visit? No     Patient was advised that Medicare may not pay for this nutrition consult today. \"If Medicare doesn't pay for services, you may have to pay. Medicare does not pay for everything, even some care that you or your health care provider have good reason to think you need. We expect that Medicare may not pay for the visit today.\"     Patient declines visit at this time.     "

## 2023-12-10 ENCOUNTER — OFFICE VISIT (OUTPATIENT)
Dept: URGENT CARE | Facility: URGENT CARE | Age: 63
End: 2023-12-10
Payer: COMMERCIAL

## 2023-12-10 VITALS
WEIGHT: 293 LBS | RESPIRATION RATE: 18 BRPM | SYSTOLIC BLOOD PRESSURE: 143 MMHG | TEMPERATURE: 97.4 F | DIASTOLIC BLOOD PRESSURE: 70 MMHG | HEART RATE: 107 BPM | OXYGEN SATURATION: 95 % | BODY MASS INDEX: 56.08 KG/M2

## 2023-12-10 DIAGNOSIS — L03.90 CELLULITIS, UNSPECIFIED CELLULITIS SITE: Primary | ICD-10-CM

## 2023-12-10 PROCEDURE — 99213 OFFICE O/P EST LOW 20 MIN: CPT | Performed by: FAMILY MEDICINE

## 2023-12-10 RX ORDER — CEPHALEXIN 500 MG/1
500 CAPSULE ORAL 3 TIMES DAILY
Qty: 15 CAPSULE | Refills: 0 | Status: SHIPPED | OUTPATIENT
Start: 2023-12-10 | End: 2023-12-13

## 2023-12-10 ASSESSMENT — ENCOUNTER SYMPTOMS
CARDIOVASCULAR NEGATIVE: 1
NEUROLOGICAL NEGATIVE: 1
MUSCULOSKELETAL NEGATIVE: 1
HEMATOLOGIC/LYMPHATIC NEGATIVE: 1
PSYCHIATRIC NEGATIVE: 1
RESPIRATORY NEGATIVE: 1
CONSTITUTIONAL NEGATIVE: 1
ENDOCRINE NEGATIVE: 1
EYES NEGATIVE: 1
WOUND: 1
ALLERGIC/IMMUNOLOGIC NEGATIVE: 1
GASTROINTESTINAL NEGATIVE: 1

## 2023-12-10 NOTE — PROGRESS NOTES
SUBJECTIVE:   Ludmila Sanchez is a 63 year old female presenting with a chief complaint of   Chief Complaint   Patient presents with    Oral Swelling     Upper lip swelling.  Feels its an ingrown hair.  Had a little green discharge the other day.       She is an established patient of [facility].    Rash    Onset of rash was 3 day(s) ago.   Course of illness is gradual onset.  Severity moderate  Current and Associated symptoms: red and blistering   Location of the rash: face.  Previous history of a similar rash? No  Recent exposure history: none known  Denies exposure to: none known  Associated symptoms include: nothing.  Treatment measures tried include: none    Patient is a 63 yr old female here for possible infected ingrown hair in her nasal passage. She says it started spontaneously a few days ago. The area is quite painful and she was able to express some greenish discharge.     Review of Systems   Constitutional: Negative.    HENT: Negative.     Eyes: Negative.    Respiratory: Negative.     Cardiovascular: Negative.    Gastrointestinal: Negative.    Endocrine: Negative.    Genitourinary: Negative.    Musculoskeletal: Negative.    Skin:  Positive for rash and wound.   Allergic/Immunologic: Negative.    Neurological: Negative.    Hematological: Negative.    Psychiatric/Behavioral: Negative.         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 Mother 74        stents    Coronary Artery Disease Mother     Hypertension  Mother     Asthma Mother     Diabetes Father 70        type 2    Esophageal Cancer Father     Thyroid Cancer Sister     No Known Problems Brother     No Known Problems Brother      Current Outpatient Medications   Medication Sig Dispense Refill    cephALEXin (KEFLEX) 500 MG capsule Take 1 capsule (500 mg) by mouth 3 times daily for 5 days 15 capsule 0    levothyroxine (SYNTHROID/LEVOTHROID) 25 MCG tablet Take 1 tablet (25 mcg) by mouth daily 90 tablet 3    metFORMIN (GLUCOPHAGE XR) 500 MG 24 hr tablet Take 1 tablet (500 mg) by mouth 2 times daily (with meals) 180 tablet 3    phentermine (ADIPEX-P) 37.5 MG tablet Take 0.5-1 tablets (18.75-37.5 mg) by mouth every morning (before breakfast) for 180 days 90 tablet 1    sertraline (ZOLOFT) 100 MG tablet Take 1 tablet (100 mg) by mouth daily 90 tablet 1    albuterol (PROAIR HFA/PROVENTIL HFA/VENTOLIN HFA) 108 (90 Base) MCG/ACT inhaler Inhale 2 puffs into the lungs every 6 hours as needed for shortness of breath, wheezing or cough (Patient not taking: Reported on 12/10/2023) 18 g 0    triamcinolone (KENALOG) 0.1 % external cream APPLY TO AFFECTED AREA TWICE A DAY (Patient not taking: Reported on 10/30/2023) 80 g 1     Social History     Tobacco Use    Smoking status: Former     Packs/day: 0.00     Years: 5.00     Additional pack years: 0.00     Total pack years: 0.00     Types: Cigarettes     Quit date: 1980     Years since quittin.9    Smokeless tobacco: Never   Substance Use Topics    Alcohol use: No     Comment: quit        OBJECTIVE  BP (!) 143/70 (BP Location: Left arm, Patient Position: Sitting, Cuff Size: Adult Large)   Pulse 107   Temp 97.4  F (36.3  C) (Tympanic)   Resp 18   Wt (!) 152.9 kg (337 lb)   LMP 2011   SpO2 95%   BMI 56.08 kg/m      Physical Exam  Constitutional:       Appearance: Normal appearance.   HENT:      Head: Normocephalic and atraumatic.   Cardiovascular:      Rate and Rhythm: Normal rate and regular rhythm.       Pulses: Normal pulses.      Heart sounds: Normal heart sounds.   Pulmonary:      Effort: Pulmonary effort is normal.      Breath sounds: Normal breath sounds.   Musculoskeletal:         General: Normal range of motion.      Cervical back: Normal range of motion and neck supple.   Skin:     Findings: Erythema and rash present.             Comments: There is redness in the left nostril /upper lip , tender to the touch.    Neurological:      Mental Status: She is alert and oriented to person, place, and time.         Labs:  No results found for this or any previous visit (from the past 24 hour(s)).    X-Ray was not done.    ASSESSMENT:      ICD-10-CM    1. Cellulitis, unspecified cellulitis site  L03.90 cephALEXin (KEFLEX) 500 MG capsule       Patient was reassured, asked to use warm compresses.     Medical Decision Making:    Differential Diagnosis:  Rash: Cellulitis    Serious Comorbid Conditions:  Adult:  None    PLAN:    Rash:  antibiotic    Followup:    If not improving or if condition worsens, follow up with your Primary Care Provider    There are no Patient Instructions on file for this visit.

## 2023-12-13 ENCOUNTER — TELEPHONE (OUTPATIENT)
Dept: FAMILY MEDICINE | Facility: CLINIC | Age: 63
End: 2023-12-13

## 2023-12-13 ENCOUNTER — OFFICE VISIT (OUTPATIENT)
Dept: FAMILY MEDICINE | Facility: CLINIC | Age: 63
End: 2023-12-13
Payer: COMMERCIAL

## 2023-12-13 VITALS
SYSTOLIC BLOOD PRESSURE: 123 MMHG | HEART RATE: 92 BPM | TEMPERATURE: 98.1 F | WEIGHT: 293 LBS | HEIGHT: 65 IN | BODY MASS INDEX: 48.82 KG/M2 | OXYGEN SATURATION: 97 % | RESPIRATION RATE: 23 BRPM | DIASTOLIC BLOOD PRESSURE: 80 MMHG

## 2023-12-13 DIAGNOSIS — L03.90 CELLULITIS, UNSPECIFIED CELLULITIS SITE: Primary | ICD-10-CM

## 2023-12-13 DIAGNOSIS — J34.0 ABSCESS OF NASAL CAVITY: ICD-10-CM

## 2023-12-13 PROCEDURE — 99214 OFFICE O/P EST MOD 30 MIN: CPT | Performed by: FAMILY MEDICINE

## 2023-12-13 RX ORDER — RESPIRATORY SYNCYTIAL VIRUS VACCINE 120MCG/0.5
0.5 KIT INTRAMUSCULAR ONCE
Qty: 1 EACH | Refills: 0 | Status: CANCELLED | OUTPATIENT
Start: 2023-12-13 | End: 2023-12-13

## 2023-12-13 RX ORDER — CEPHALEXIN 500 MG/1
500 CAPSULE ORAL 3 TIMES DAILY
Qty: 15 CAPSULE | Refills: 0 | Status: SHIPPED | OUTPATIENT
Start: 2023-12-13 | End: 2024-03-05

## 2023-12-13 ASSESSMENT — PAIN SCALES - GENERAL: PAINLEVEL: EXTREME PAIN (8)

## 2023-12-13 NOTE — TELEPHONE ENCOUNTER
Patient calling stating she was seen in UC on Sunday for facial swelling and irritation and was diagnosed with Cellulitis and possible infected ingrown hair in her nasal passage. She reports she has been taking antibiotic prescribed and does not feel it is getting any better.  instructed patient to follow up with PCP if not improving. RN assisted with scheduling patient for an appointment today at 12:30 pm.       Sarah Resendez RN on 12/13/2023 at 8:53 AM

## 2023-12-13 NOTE — PROGRESS NOTES
Assessment & Plan       ICD-10-CM    1. Cellulitis, unspecified cellulitis site  L03.90 cephALEXin (KEFLEX) 500 MG capsule      2. Abscess of nasal cavity  J34.0         She appears to be on appropriate antibiotic treatment for her cellulitis/abscess  I advised her to continue to warm pack the area  We will extend her cephalexin treatment for an additional 5 days, so 10 days total  Continue the doxycycline for a 7-day total course  I will complete paperwork for her disability leave from work  We will plan to have her remain off work for the rest of this week but then return to work on Monday December 18  I advised a follow-up visit if she is not able to do that      35 minutes spent by me on the date of the encounter doing chart review, history and exam, documentation and further activities per the note     MED REC REQUIRED  Post Medication Reconciliation Status: discharge medications reconciled, continue medications without change  (but proceed with a longer course of cephalexin as discussed)      Sidney Lockwood MD  [facility] Keys is a 63 year old, presenting for the following health issues:  ER F/U and Forms      12/13/2023    12:12 PM   Additional Questions   Roomed by vivi cui       [facility]       ED/UC Followup:    Facility:  [facility]  Date of visit: 12/10/23  Reason for visit: 12/10/23-12/11/23  Current Status: Pt reports that left side nostril is still swelling and hurts. Pain currently 8/10. She said that now it is going up in her face.  Pt has forms that need to be completed for work.     She developed some redness and tenderness and swelling of the left nostril this past weekend on Saturday, December 9.  She went into the urgent care the next day and was felt to have cellulitis and received a 5-day prescription for cephalexin 500 mg 3 times daily.  Her symptoms progressed later that day, so she went into the ER and was seen.  She was felt to have a  "small abscess of the left nares and had it lanced, but minimal drainage occurred.  She was placed on doxycycline for 7 days as well.  She was on vacation the next day from work, but missed the next day, which was yesterday, and she is missing today.  She is still having discomfort of the nasal region.  She was prescribed oxycodone from the ER, but she is not needing those.  She has noticed a little backup of the left tear duct with some material in the corner of her left eye at times.  She has disability paperwork she would like completed.  She thinks she will need to stay off the remainder of this week at a minimum.    Patient Active Problem List   Diagnosis    Mild major depression (H)    Vitamin D deficiency    Vitamin B12 deficiency without anemia    Colonic polyp    Obesity    Cerebral cavernoma    Advanced directives, counseling/discussion    TEODORO (obstructive sleep apnea)    Morbid obesity due to excess calories (H)    Bipolar affective disorder in remission (H24)    History of hypertension    Hyperlipidemia LDL goal <100    S/P TKR (total knee replacement)    Pruritus    Acute blood loss anemia    Adenomatous polyp of colon, unspecified part of colon    Pain of left heel    Abnormal gait    Elevated hemoglobin A1c    Metabolic syndrome    Dyslipidemia     Current Outpatient Medications   Medication    cephALEXin (KEFLEX) 500 MG capsule    levothyroxine (SYNTHROID/LEVOTHROID) 25 MCG tablet    metFORMIN (GLUCOPHAGE XR) 500 MG 24 hr tablet    phentermine (ADIPEX-P) 37.5 MG tablet    sertraline (ZOLOFT) 100 MG tablet     No current facility-administered medications for this visit.           Review of Systems   No known fever.  Review of systems otherwise noncontributory.      Objective    /80 (BP Location: Left arm, Patient Position: Chair, Cuff Size: Adult Large)   Pulse 92   Temp 98.1  F (36.7  C) (Oral)   Resp 23   Ht 1.651 m (5' 5\")   Wt (!) 150.6 kg (332 lb)   LMP 11/01/2011   SpO2 97%   BMI " 55.25 kg/m    Body mass index is 55.25 kg/m .  Physical Exam   GENERAL: alert, no distress, and obese  HENT: She has some erythema and swelling and tenderness and firmness of the inferior portion of the left nares.    Her urgent care and ER notes were reviewed.

## 2023-12-18 ENCOUNTER — PATIENT OUTREACH (OUTPATIENT)
Dept: GASTROENTEROLOGY | Facility: CLINIC | Age: 63
End: 2023-12-18
Payer: COMMERCIAL

## 2024-01-03 ENCOUNTER — TELEPHONE (OUTPATIENT)
Dept: FAMILY MEDICINE | Facility: CLINIC | Age: 64
End: 2024-01-03

## 2024-01-03 NOTE — TELEPHONE ENCOUNTER
Reason for Call:  Form, our goal is to have forms completed with 72 hours, however, some forms may require a visit or additional information.    Type of letter, form or note:  FMLA    Who is the form from?: Patient    Where did the form come from: Patient or family brought in       What clinic location was the form placed at?: [facility]    Where the form was placed:  [employer]    What number is listed as a contact on the form?: [phone]       Additional comments: Patient stated she doesn't need a copy and that she brought in the last form that was filled out to help. Please fax to Vikas at [phone]    Call taken on 1/3/2024 at 12:51 PM by Marjorie Solis

## 2024-01-04 NOTE — TELEPHONE ENCOUNTER
Please speak with patient. Dr. Lockwood is not in clinic.   He advised the following:  I will complete paperwork for her disability leave from work  We will plan to have her remain off work for the rest of this week but then return to work on Monday December 18  I advised a follow-up visit if she is not able to do that     Is she requesting additional leave?  Uyen Philippe PA-C

## 2024-01-08 NOTE — TELEPHONE ENCOUNTER
LM for patient to call back to get more information on FMLA paperwork. If patient calls back please reach out to team     Holger-

## 2024-01-08 NOTE — TELEPHONE ENCOUNTER
Spoke to patient. She went back to work on 12/18. Her job sent her the wrong paperwork to be filled out in the beginning. Her job is requesting that she fill out [facility] paperwork because she was out of work those 4 days.     Holger-

## 2024-01-20 ENCOUNTER — HEALTH MAINTENANCE LETTER (OUTPATIENT)
Age: 64
End: 2024-01-20

## 2024-02-26 DIAGNOSIS — F31.31 BIPOLAR AFFECTIVE DISORDER, CURRENTLY DEPRESSED, MILD (H): ICD-10-CM

## 2024-02-28 RX ORDER — SERTRALINE HYDROCHLORIDE 100 MG/1
100 TABLET, FILM COATED ORAL DAILY
Qty: 30 TABLET | Refills: 0 | OUTPATIENT
Start: 2024-02-28

## 2024-03-05 ENCOUNTER — OFFICE VISIT (OUTPATIENT)
Dept: FAMILY MEDICINE | Facility: CLINIC | Age: 64
End: 2024-03-05
Payer: COMMERCIAL

## 2024-03-05 VITALS
HEART RATE: 76 BPM | TEMPERATURE: 96.1 F | WEIGHT: 293 LBS | BODY MASS INDEX: 55.08 KG/M2 | DIASTOLIC BLOOD PRESSURE: 80 MMHG | OXYGEN SATURATION: 96 % | SYSTOLIC BLOOD PRESSURE: 136 MMHG | RESPIRATION RATE: 18 BRPM

## 2024-03-05 DIAGNOSIS — Z29.11 NEED FOR VACCINATION AGAINST RESPIRATORY SYNCYTIAL VIRUS: ICD-10-CM

## 2024-03-05 DIAGNOSIS — I10 HYPERTENSION GOAL BP (BLOOD PRESSURE) < 140/90: ICD-10-CM

## 2024-03-05 DIAGNOSIS — Z78.0 ASYMPTOMATIC POSTMENOPAUSAL STATUS: ICD-10-CM

## 2024-03-05 DIAGNOSIS — E66.813 CLASS 3 SEVERE OBESITY DUE TO EXCESS CALORIES WITH SERIOUS COMORBIDITY AND BODY MASS INDEX (BMI) OF 50.0 TO 59.9 IN ADULT (H): ICD-10-CM

## 2024-03-05 DIAGNOSIS — R73.01 IFG (IMPAIRED FASTING GLUCOSE): ICD-10-CM

## 2024-03-05 DIAGNOSIS — R73.03 PREDIABETES: Primary | ICD-10-CM

## 2024-03-05 DIAGNOSIS — Z12.31 VISIT FOR SCREENING MAMMOGRAM: ICD-10-CM

## 2024-03-05 DIAGNOSIS — F33.40 RECURRENT MAJOR DEPRESSIVE DISORDER, IN REMISSION (H): ICD-10-CM

## 2024-03-05 DIAGNOSIS — E78.5 HYPERLIPIDEMIA LDL GOAL <100: ICD-10-CM

## 2024-03-05 DIAGNOSIS — F31.70 BIPOLAR AFFECTIVE DISORDER IN REMISSION (H): ICD-10-CM

## 2024-03-05 DIAGNOSIS — E03.9 ACQUIRED HYPOTHYROIDISM: ICD-10-CM

## 2024-03-05 DIAGNOSIS — E66.01 CLASS 3 SEVERE OBESITY DUE TO EXCESS CALORIES WITH SERIOUS COMORBIDITY AND BODY MASS INDEX (BMI) OF 50.0 TO 59.9 IN ADULT (H): ICD-10-CM

## 2024-03-05 PROBLEM — F31.31 BIPOLAR AFFECTIVE DISORDER, CURRENTLY DEPRESSED, MILD (H): Status: ACTIVE | Noted: 2024-03-05

## 2024-03-05 PROBLEM — F31.31 BIPOLAR AFFECTIVE DISORDER, CURRENTLY DEPRESSED, MILD (H): Status: RESOLVED | Noted: 2024-03-05 | Resolved: 2024-03-05

## 2024-03-05 LAB
ALBUMIN UR-MCNC: NEGATIVE MG/DL
ANION GAP SERPL CALCULATED.3IONS-SCNC: 10 MMOL/L (ref 7–15)
APPEARANCE UR: CLEAR
BILIRUB UR QL STRIP: NEGATIVE
BUN SERPL-MCNC: 17.8 MG/DL (ref 8–23)
CALCIUM SERPL-MCNC: 10.2 MG/DL (ref 8.8–10.2)
CHLORIDE SERPL-SCNC: 101 MMOL/L (ref 98–107)
CHOLEST SERPL-MCNC: 209 MG/DL
COLOR UR AUTO: YELLOW
CREAT SERPL-MCNC: 0.94 MG/DL (ref 0.51–0.95)
DEPRECATED HCO3 PLAS-SCNC: 28 MMOL/L (ref 22–29)
EGFRCR SERPLBLD CKD-EPI 2021: 67 ML/MIN/1.73M2
FASTING STATUS PATIENT QL REPORTED: ABNORMAL
FASTING STATUS PATIENT QL REPORTED: ABNORMAL
GLUCOSE SERPL-MCNC: 133 MG/DL (ref 70–99)
GLUCOSE SERPL-MCNC: 133 MG/DL (ref 70–99)
GLUCOSE UR STRIP-MCNC: NEGATIVE MG/DL
HBA1C MFR BLD: 6.2 % (ref 0–5.6)
HDLC SERPL-MCNC: 47 MG/DL
HGB UR QL STRIP: NEGATIVE
KETONES UR STRIP-MCNC: NEGATIVE MG/DL
LDLC SERPL CALC-MCNC: 134 MG/DL
LEUKOCYTE ESTERASE UR QL STRIP: NEGATIVE
NITRATE UR QL: NEGATIVE
NONHDLC SERPL-MCNC: 162 MG/DL
PH UR STRIP: 6 [PH] (ref 5–7)
POTASSIUM SERPL-SCNC: 5.2 MMOL/L (ref 3.4–5.3)
SODIUM SERPL-SCNC: 139 MMOL/L (ref 135–145)
SP GR UR STRIP: 1.02 (ref 1–1.03)
T4 FREE SERPL-MCNC: 0.91 NG/DL (ref 0.9–1.7)
TRIGL SERPL-MCNC: 138 MG/DL
TSH SERPL DL<=0.005 MIU/L-ACNC: 5.53 UIU/ML (ref 0.3–4.2)
UROBILINOGEN UR STRIP-ACNC: 0.2 E.U./DL

## 2024-03-05 PROCEDURE — 91320 SARSCV2 VAC 30MCG TRS-SUC IM: CPT | Performed by: FAMILY MEDICINE

## 2024-03-05 PROCEDURE — 84443 ASSAY THYROID STIM HORMONE: CPT | Performed by: FAMILY MEDICINE

## 2024-03-05 PROCEDURE — G0009 ADMIN PNEUMOCOCCAL VACCINE: HCPCS | Performed by: FAMILY MEDICINE

## 2024-03-05 PROCEDURE — 83036 HEMOGLOBIN GLYCOSYLATED A1C: CPT | Performed by: FAMILY MEDICINE

## 2024-03-05 PROCEDURE — 99214 OFFICE O/P EST MOD 30 MIN: CPT | Mod: 25 | Performed by: FAMILY MEDICINE

## 2024-03-05 PROCEDURE — 84439 ASSAY OF FREE THYROXINE: CPT | Performed by: FAMILY MEDICINE

## 2024-03-05 PROCEDURE — G0008 ADMIN INFLUENZA VIRUS VAC: HCPCS | Performed by: FAMILY MEDICINE

## 2024-03-05 PROCEDURE — 81003 URINALYSIS AUTO W/O SCOPE: CPT | Performed by: FAMILY MEDICINE

## 2024-03-05 PROCEDURE — 90480 ADMN SARSCOV2 VAC 1/ONLY CMP: CPT | Performed by: FAMILY MEDICINE

## 2024-03-05 PROCEDURE — 80061 LIPID PANEL: CPT | Performed by: FAMILY MEDICINE

## 2024-03-05 PROCEDURE — 90662 IIV NO PRSV INCREASED AG IM: CPT | Performed by: FAMILY MEDICINE

## 2024-03-05 PROCEDURE — 90677 PCV20 VACCINE IM: CPT | Performed by: FAMILY MEDICINE

## 2024-03-05 PROCEDURE — 36415 COLL VENOUS BLD VENIPUNCTURE: CPT | Performed by: FAMILY MEDICINE

## 2024-03-05 PROCEDURE — 80048 BASIC METABOLIC PNL TOTAL CA: CPT | Performed by: FAMILY MEDICINE

## 2024-03-05 RX ORDER — RESPIRATORY SYNCYTIAL VIRUS VACCINE 120MCG/0.5
0.5 KIT INTRAMUSCULAR ONCE
Qty: 1 EACH | Refills: 0 | Status: SHIPPED | OUTPATIENT
Start: 2024-03-05 | End: 2024-03-05

## 2024-03-05 RX ORDER — LOSARTAN POTASSIUM 25 MG/1
25 TABLET ORAL DAILY
Qty: 30 TABLET | Refills: 0 | Status: SHIPPED | OUTPATIENT
Start: 2024-03-05 | End: 2024-03-29

## 2024-03-05 ASSESSMENT — PATIENT HEALTH QUESTIONNAIRE - PHQ9
SUM OF ALL RESPONSES TO PHQ QUESTIONS 1-9: 1
SUM OF ALL RESPONSES TO PHQ QUESTIONS 1-9: 1
10. IF YOU CHECKED OFF ANY PROBLEMS, HOW DIFFICULT HAVE THESE PROBLEMS MADE IT FOR YOU TO DO YOUR WORK, TAKE CARE OF THINGS AT HOME, OR GET ALONG WITH OTHER PEOPLE: NOT DIFFICULT AT ALL

## 2024-03-05 ASSESSMENT — PAIN SCALES - GENERAL: PAINLEVEL: NO PAIN (0)

## 2024-03-05 NOTE — PROGRESS NOTES
"  Assessment & Plan     Bipolar affective disorder in remission (H24)  Stable   Does well with zoloft    Recurrent major depressive disorder, in remission (H24)  Stable   Refills done     Class 3 severe obesity due to excess calories with serious comorbidity and body mass index (BMI) of 50.0 to 59.9 in adult (H)  Pt is in weight management  Diet discussed     Acquired hypothyroidism  Pending   Stable on meds   - TSH with free T4 reflex; Future    IFG (impaired fasting glucose)  Needs to watch diet  Pending labs   - Hemoglobin A1c; Future  - Glucose; Future    Hyperlipidemia LDL goal <100  Pending   - Lipid panel reflex to direct LDL Fasting; Future    Hypertension goal BP (blood pressure) < 140/90  Advised   Losing wt will help Hypertension   - losartan (COZAAR) 25 MG tablet; Take 1 tablet (25 mg) by mouth daily  - Basic metabolic panel  (Ca, Cl, CO2, Creat, Gluc, K, Na, BUN); Future  - UA Macroscopic with reflex to Microscopic and Culture - Lab Collect; Future    Need for vaccination against respiratory syncytial virus  Advised   - respiratory syncytial virus vaccine, bivalent (ABRYSVO) injection; Inject 0.5 mLs into the muscle once for 1 dose    Visit for screening mammogram  Advised   - MA SCREENING DIGITAL BILAT - Future  (s+30); Future    Asymptomatic postmenopausal status  Advised   - DEXA HIP/PELVIS/SPINE - Future; Future    BMI  Estimated body mass index is 55.08 kg/m  as calculated from the following:    Height as of 12/13/23: 1.651 m (5' 5\").    Weight as of this encounter: 150.1 kg (331 lb).   Weight management plan: Specific weight management program called as above  discussed      Work on weight loss  Regular exercise  Consider GLP1  Subjective   Ludmila is a 63 year old, presenting for the following health issues:  Recheck Medication      3/5/2024    10:25 AM   Additional Questions   Roomed by Katharine Ngo of Present Illness       Mental Health Follow-up:  Patient presents to follow-up on " Depression.Patient's depression since last visit has been:  Good  The patient is not having other symptoms associated with depression.      Any significant life events: No  Patient is not feeling anxious or having panic attacks.  Patient has no concerns about alcohol or drug use.    Hypothyroidism:     Since last visit, patient describes the following symptoms::  None    She eats 2-3 servings of fruits and vegetables daily.She consumes 1 sweetened beverage(s) daily.She exercises with enough effort to increase her heart rate 9 or less minutes per day.  She exercises with enough effort to increase her heart rate 3 or less days per week.   She is taking medications regularly.           Depression Followup  How are you doing with your depression since your last visit? Improved   Are you having other symptoms that might be associated with depression? No  Have you had a significant life event?  No   Are you feeling anxious or having panic attacks?   No  Do you have any concerns with your use of alcohol or other drugs? No    Social History     Tobacco Use    Smoking status: Former     Packs/day: 0.00     Years: 5.00     Additional pack years: 0.00     Total pack years: 0.00     Types: Cigarettes     Quit date: 1980     Years since quittin.2    Smokeless tobacco: Never   Vaping Use    Vaping Use: Never used   Substance Use Topics    Alcohol use: No     Comment: quit     Drug use: No         2022     4:13 PM 2023     3:15 PM 3/5/2024    10:09 AM   PHQ   PHQ-9 Total Score 0 0 1   Q9: Thoughts of better off dead/self-harm past 2 weeks Not at all Not at all Not at all         2013     3:48 PM 2022     3:35 PM   WHIT-7 SCORE   Total Score 3    Total Score  0         3/5/2024    10:09 AM   Last PHQ-9   1.  Little interest or pleasure in doing things 0   2.  Feeling down, depressed, or hopeless 0   3.  Trouble falling or staying asleep, or sleeping too much 0   4.  Feeling tired or having little  energy 0   5.  Poor appetite or overeating 1   6.  Feeling bad about yourself 0   7.  Trouble concentrating 0   8.  Moving slowly or restless 0   Q9: Thoughts of better off dead/self-harm past 2 weeks 0   PHQ-9 Total Score 1         1/13/2022     3:35 PM   WHIT-7    1. Feeling nervous, anxious, or on edge 0   2. Not being able to stop or control worrying 0   3. Worrying too much about different things 0   4. Trouble relaxing 0   5. Being so restless that it is hard to sit still 0   6. Becoming easily annoyed or irritable 0   7. Feeling afraid, as if something awful might happen 0   WHIT-7 Total Score 0   If you checked any problems, how difficult have they made it for you to do your work, take care of things at home, or get along with other people? Not difficult at all     Doing well on Thyroid meds   Patient presents for evaluation of hypertension.  She indicates that she is feeling well and denies any symptoms referable to her elevated blood pressure. Family history: positive for hypertension, diabetes mellitus, and cardiovascular disease  Age at onset of elevated blood pressure: several months now  Cardiovascular risk factors: family history, hypertension, and obesity  Use of agents associated with hypertension: none  History of renal disease: negative      Review of Systems  CONSTITUTIONAL: NEGATIVE for fever, chills, change in weight  ENT/MOUTH: NEGATIVE for ear, mouth and throat problems  RESP: NEGATIVE for significant cough or SOB  CV: NEGATIVE for chest pain, palpitations or peripheral edema  GI: NEGATIVE for nausea, abdominal pain, heartburn, or change in bowel habits  PSYCHIATRIC: doing well on meds  Rest of the ROS is Negative except see above and Problem list [stable]        Objective    BP (!) 153/84   Pulse 76   Temp (!) 96.1  F (35.6  C) (Temporal)   Resp 18   Wt (!) 150.1 kg (331 lb)   LMP 11/01/2011   SpO2 96%   BMI 55.08 kg/m    Body mass index is 55.08 kg/m .  Physical Exam   GENERAL: alert  and no distress  GENERAL: alert, no distress, and obese  EYES: Eyes grossly normal to inspection  NECK: no adenopathy, no asymmetry, masses, or scars  RESP: lungs clear to auscultation - no rales, rhonchi or wheezes  CV: regular rate and rhythm, normal S1 S2, no S3 or S4, no murmur, click or rub, no peripheral edema  ABDOMEN: soft, nontender, no hepatosplenomegaly, no masses and bowel sounds normal  MS: no gross musculoskeletal defects noted, no edema  PSYCH: mentation appears normal, affect normal/bright    Pending         Signed Electronically by: Tonia Schmitz MD

## 2024-03-11 RX ORDER — PRAVASTATIN SODIUM 10 MG
10 TABLET ORAL DAILY
Qty: 30 TABLET | Refills: 11 | Status: SHIPPED | OUTPATIENT
Start: 2024-03-11

## 2024-03-19 ENCOUNTER — ANCILLARY PROCEDURE (OUTPATIENT)
Dept: MAMMOGRAPHY | Facility: CLINIC | Age: 64
End: 2024-03-19
Attending: FAMILY MEDICINE
Payer: COMMERCIAL

## 2024-03-19 DIAGNOSIS — Z12.31 VISIT FOR SCREENING MAMMOGRAM: ICD-10-CM

## 2024-03-19 PROCEDURE — 77067 SCR MAMMO BI INCL CAD: CPT | Mod: TC | Performed by: STUDENT IN AN ORGANIZED HEALTH CARE EDUCATION/TRAINING PROGRAM

## 2024-03-29 DIAGNOSIS — I10 HYPERTENSION GOAL BP (BLOOD PRESSURE) < 140/90: ICD-10-CM

## 2024-03-29 RX ORDER — LOSARTAN POTASSIUM 25 MG/1
25 TABLET ORAL DAILY
Qty: 30 TABLET | Refills: 2 | Status: SHIPPED | OUTPATIENT
Start: 2024-03-29 | End: 2024-08-29

## 2024-04-05 ENCOUNTER — VIRTUAL VISIT (OUTPATIENT)
Dept: SURGERY | Facility: CLINIC | Age: 64
End: 2024-04-05
Payer: COMMERCIAL

## 2024-04-05 VITALS — HEIGHT: 65 IN | BODY MASS INDEX: 48.82 KG/M2 | WEIGHT: 293 LBS

## 2024-04-05 DIAGNOSIS — E88.810 METABOLIC SYNDROME: ICD-10-CM

## 2024-04-05 DIAGNOSIS — E66.01 MORBID OBESITY WITH BMI OF 50.0-59.9, ADULT (H): Primary | ICD-10-CM

## 2024-04-05 PROCEDURE — 99214 OFFICE O/P EST MOD 30 MIN: CPT | Mod: 95 | Performed by: FAMILY MEDICINE

## 2024-04-05 ASSESSMENT — PAIN SCALES - GENERAL: PAINLEVEL: NO PAIN (0)

## 2024-04-05 NOTE — PROGRESS NOTES
Virtual Visit Details    Type of service:  Video Visit   Video Start Time:  10:03  Video End Time: 10:33    Originating Location (pt. Location): Home    Distant Location (provider location):  On-site  Platform used for Video Visit: Kit      Bariatric Follow-up    63 year old  female BMI:Body mass index is 54.91 kg/m .    Weight:   Wt Readings from Last 1 Encounters:   04/05/24 149.7 kg (330 lb)    pounds    Comorbidities:  Patient Active Problem List   Diagnosis    Mild major depression (H)    Vitamin D deficiency    Vitamin B12 deficiency without anemia    Colonic polyp    Cerebral cavernoma    Advanced directives, counseling/discussion    TEODORO (obstructive sleep apnea)    Morbid obesity due to excess calories (H)    Bipolar affective disorder in remission (H24)    History of hypertension    Hyperlipidemia LDL goal <100    S/P TKR (total knee replacement)    Pruritus    Acute blood loss anemia    Adenomatous polyp of colon, unspecified part of colon    Pain of left heel    Abnormal gait    Elevated hemoglobin A1c    Metabolic syndrome    Dyslipidemia    Hypertension goal BP (blood pressure) < 140/90       Current Outpatient Medications:     levothyroxine (SYNTHROID/LEVOTHROID) 25 MCG tablet, Take 1 tablet (25 mcg) by mouth daily, Disp: 90 tablet, Rfl: 3    losartan (COZAAR) 25 MG tablet, Take 1 tablet (25 mg) by mouth daily, Disp: 30 tablet, Rfl: 2    metFORMIN (GLUCOPHAGE XR) 500 MG 24 hr tablet, Take 1 tablet (500 mg) by mouth 2 times daily (with meals), Disp: 180 tablet, Rfl: 3    phentermine (ADIPEX-P) 37.5 MG tablet, Take 0.5-1 tablets (18.75-37.5 mg) by mouth every morning (before breakfast) for 180 days, Disp: 90 tablet, Rfl: 1    pravastatin (PRAVACHOL) 10 MG tablet, Take 1 tablet (10 mg) by mouth daily, Disp: 30 tablet, Rfl: 11    sertraline (ZOLOFT) 100 MG tablet, Take 1 tablet (100 mg) by mouth daily Needs follow up appointment, Disp: 30 tablet, Rfl: 0       Interim: Initial weight 339# BMI 56.41.  Taking care of her mom. She had cellulitis and a staph infection. She is 85 yo.  She is staying with her mother. Stress is high. Not sleeping well. Works afternoons 3-11.  Maintains a 9# weight loss.     Cholesterol   Date Value Ref Range Status   03/05/2024 209 (H) <200 mg/dL Final   01/07/2020 229 (H) <200 mg/dL Final     Comment:     Desirable:       <200 mg/dl      Hemoglobin A1C   Date Value Ref Range Status   03/05/2024 6.2 (H) 0.0 - 5.6 % Final     Comment:     Normal <5.7%   Prediabetes 5.7-6.4%    Diabetes 6.5% or higher     Note: Adopted from ADA consensus guidelines.   01/07/2020 5.5 0 - 5.6 % Final     Comment:     Normal <5.7% Prediabetes 5.7-6.4%  Diabetes 6.5% or higher - adopted from ADA   consensus guidelines.        BP Readings from Last 3 Encounters:   03/05/24 136/80   12/13/23 123/80   12/10/23 (!) 143/70        Plan:  DIET  Lean proteins with each meal. Discussed portable, quick, convenient ideas, meal planning, bulk cooking.    EXERCISE Continue counting steps, walking grand son's dog   PHARMACOTHERAPY continue phentermine and    Discussed importance of protecting sleep, stress management and self-cares in care-giving role. The high stress and lack of sleep are likely why her A1c is up, having lost 9# and can affect blood pressure as well. Doing well, having lost 9# during this particularly stressful period. Quarterly follow up. Recommend RD which she cannot do financially.     -We reviewed her medications and whether associated with weight gain.        We discussed [facility] Bariatric Basics including:  -eating 3 meals daily  -eating protein first  -eating slowly, chewing food well  -avoiding/limiting calorie containing beverages  -choosing wheat, not white with breads, crackers, pastas, gage, bagels, tortillas, rice  -limiting restaurant or cafeteria eating to twice a week or less  -We discussed the importance of restorative sleep and stress management in maintaining a healthy  "weight.  -We discussed insulin resistance and glycemic index as it relates to appetite and weight control  -We discussed the National Weight Control Registry healthy weight maintenance strategies and ways to optimize metabolism.  -We discussed the importance of physical activity including cardiovascular and strength training in maintaining a healthier weight and explored viable options.      DIETARY HISTORY  Meals Per Day: not yet in a pattern.  Eating Protein First?: trying  Food Diary: B:oatmeal L:salad cottage cheese D:protein drink, chicken berries or apples  Snacks Per Day: yes  Typical Snack: cottage cheeses,   Fluid Intake: intentional as she has bottled water  Portion Control: improved  Calorie Containing Beverages: no  Alcohol per week: no  Typical Protein Food Choices: lean  Choosing Whole Grains: yes  Grocery Shopping is done by: shared mainly her but sometimes brother brings meals for hermother  Food Preparation is done by: herself mainly  Meals at Restaurant/Cafeteria/Take Out Per Week: 0-1  Eating at the Table:   TV is Off During Meals:     Positive Changes Since Last Visit: portions  Struggling With: sleep stress exercise    Knowledgeable in Reading Food Labels: yes  Getting Adequate Protein: yes  Sleeping 7-8 hours/day midnight to 6:30 interrupted  Stress management walking her grandson's dog, trying to rest or sleep, games on ipad    PHYSICAL ACTIVITY PATTERNS:  Cardiovascular: walking on the treadmill, walking the stairs  Strength Training: no,     REVIEW OF SYSTEMS  As above  PHYSICAL EXAM: (most recent vitals and today's stated weight)  Vitals: Ht 1.651 m (5' 5\")   Wt 149.7 kg (330 lb)   LMP 11/01/2011   BMI 54.91 kg/m        GEN: Pleasant and in no acute distress  PSYCH: A&OX3,     I have reviewed the note as documented above.  This accurately captures the substance of my conversation with the patient.  Thank you for the opportunity to participate in the care of your patient.    Georgia JAVIER" Ty Esquivel MD, FAAFP  [facility]  Diplomate, American Board of Obesity Medicine    Total time spent on the date of this encounter doing: chart review, review of test results, patient visit, physical exam, education, counseling, developing plan of care, and documenting = 30 minutes.

## 2024-04-05 NOTE — NURSING NOTE
Is the patient currently in the state of [place]? YES    Visit mode:VIDEO    If the visit is dropped, the patient can be reconnected by: VIDEO VISIT: Text to cell phone:   Telephone Information:   Mobile [phone]       Will anyone else be joining the visit? NO  (If patient encounters technical issues they should call [phone]    How would you like to obtain your AVS? MyChart    Are changes needed to the allergy or medication list? No    Reason for visit: RECHECK    Zoey OLIVARES

## 2024-04-05 NOTE — PATIENT INSTRUCTIONS
"Convenient Ideas  Hardboiled egg   Tuna   Berries  Almonds  Cottage cheese  String cheese  Apples  Oranges  Tuna fish with 1 cup sliced cucumbers  Roasted garbanzo beans with paprika and cayenne pepper  Sweet potato with   cup chili beans or   cup cottage cheese  Turkey slices  1 small apple with 1 hardboiled egg  Avocado Toast  Peanut Butter Toast  Sccrambled eggs with cheese/veggies  Greek Yogurt  Peppers  Humus with baby carrots  Peanut butter with  celery  Cashews  Pistachios  Pecans  Walnuts   Protein shakes and Protein Bars are nice between meals or instead of skipping meals  Bulk cooking on weekends to \"heat and eat\" the rest of the week or leftovers are convenient for lunch and dinners  Lean Cuisine-like meals  "

## 2024-04-05 NOTE — LETTER
4/5/2024         RE: Ludmila Sanchez  [address]  [place] [address]        Dear Colleague,    Thank you for referring your patient, Ludmila Sanchez, to the [facility] [place]. Please see a copy of my visit note below.    Virtual Visit Details    Type of service:  Video Visit   Video Start Time:  10:03  Video End Time: 10:33    Originating Location (pt. Location): Home    Distant Location (provider location):  On-site  Platform used for Video Visit: [facility]      Bariatric Follow-up    63 year old  female BMI:Body mass index is 54.91 kg/m .    Weight:   Wt Readings from Last 1 Encounters:   04/05/24 149.7 kg (330 lb)    pounds    Comorbidities:  Patient Active Problem List   Diagnosis     Mild major depression (H)     Vitamin D deficiency     Vitamin B12 deficiency without anemia     Colonic polyp     Cerebral cavernoma     Advanced directives, counseling/discussion     TEODORO (obstructive sleep apnea)     Morbid obesity due to excess calories (H)     Bipolar affective disorder in remission (H24)     History of hypertension     Hyperlipidemia LDL goal <100     S/P TKR (total knee replacement)     Pruritus     Acute blood loss anemia     Adenomatous polyp of colon, unspecified part of colon     Pain of left heel     Abnormal gait     Elevated hemoglobin A1c     Metabolic syndrome     Dyslipidemia     Hypertension goal BP (blood pressure) < 140/90       Current Outpatient Medications:      levothyroxine (SYNTHROID/LEVOTHROID) 25 MCG tablet, Take 1 tablet (25 mcg) by mouth daily, Disp: 90 tablet, Rfl: 3     losartan (COZAAR) 25 MG tablet, Take 1 tablet (25 mg) by mouth daily, Disp: 30 tablet, Rfl: 2     metFORMIN (GLUCOPHAGE XR) 500 MG 24 hr tablet, Take 1 tablet (500 mg) by mouth 2 times daily (with meals), Disp: 180 tablet, Rfl: 3     phentermine (ADIPEX-P) 37.5 MG tablet, Take 0.5-1 tablets (18.75-37.5 mg) by mouth every morning (before breakfast) for 180 days,  Disp: 90 tablet, Rfl: 1     pravastatin (PRAVACHOL) 10 MG tablet, Take 1 tablet (10 mg) by mouth daily, Disp: 30 tablet, Rfl: 11     sertraline (ZOLOFT) 100 MG tablet, Take 1 tablet (100 mg) by mouth daily Needs follow up appointment, Disp: 30 tablet, Rfl: 0       Interim: Initial weight 339# BMI 56.41. Taking care of her mom. She had cellulitis and a staph infection. She is 85 yo.  She is staying with her mother. Stress is high. Not sleeping well. Works afternoons 3-11.  Maintains a 9# weight loss.     Cholesterol   Date Value Ref Range Status   03/05/2024 209 (H) <200 mg/dL Final   01/07/2020 229 (H) <200 mg/dL Final     Comment:     Desirable:       <200 mg/dl      Hemoglobin A1C   Date Value Ref Range Status   03/05/2024 6.2 (H) 0.0 - 5.6 % Final     Comment:     Normal <5.7%   Prediabetes 5.7-6.4%    Diabetes 6.5% or higher     Note: Adopted from ADA consensus guidelines.   01/07/2020 5.5 0 - 5.6 % Final     Comment:     Normal <5.7% Prediabetes 5.7-6.4%  Diabetes 6.5% or higher - adopted from ADA   consensus guidelines.        BP Readings from Last 3 Encounters:   03/05/24 136/80   12/13/23 123/80   12/10/23 (!) 143/70        Plan:  DIET  Lean proteins with each meal. Discussed portable, quick, convenient ideas, meal planning, bulk cooking.    EXERCISE Continue counting steps, walking grand son's dog   PHARMACOTHERAPY continue phentermine and    Discussed importance of protecting sleep, stress management and self-cares in care-giving role. The high stress and lack of sleep are likely why her A1c is up, having lost 9# and can affect blood pressure as well. Doing well, having lost 9# during this particularly stressful period. Quarterly follow up. Recommend RD which she cannot do financially.     -We reviewed her medications and whether associated with weight gain.        We discussed [facility] Bariatric Basics including:  -eating 3 meals daily  -eating protein first  -eating slowly, chewing food  "well  -avoiding/limiting calorie containing beverages  -choosing wheat, not white with breads, crackers, pastas, gage, bagels, tortillas, rice  -limiting restaurant or cafeteria eating to twice a week or less  -We discussed the importance of restorative sleep and stress management in maintaining a healthy weight.  -We discussed insulin resistance and glycemic index as it relates to appetite and weight control  -We discussed the National Weight Control Registry healthy weight maintenance strategies and ways to optimize metabolism.  -We discussed the importance of physical activity including cardiovascular and strength training in maintaining a healthier weight and explored viable options.      DIETARY HISTORY  Meals Per Day: not yet in a pattern.  Eating Protein First?: trying  Food Diary: B:oatmeal L:salad cottage cheese D:protein drink, chicken berries or apples  Snacks Per Day: yes  Typical Snack: cottage cheeses,   Fluid Intake: intentional as she has bottled water  Portion Control: improved  Calorie Containing Beverages: no  Alcohol per week: no  Typical Protein Food Choices: lean  Choosing Whole Grains: yes  Grocery Shopping is done by: shared mainly her but sometimes brother brings meals for hermother  Food Preparation is done by: herself mainly  Meals at Restaurant/Cafeteria/Take Out Per Week: 0-1  Eating at the Table:   TV is Off During Meals:     Positive Changes Since Last Visit: portions  Struggling With: sleep stress exercise    Knowledgeable in Reading Food Labels: yes  Getting Adequate Protein: yes  Sleeping 7-8 hours/day midnight to 6:30 interrupted  Stress management walking her grandson's dog, trying to rest or sleep, games on ipad    PHYSICAL ACTIVITY PATTERNS:  Cardiovascular: walking on the treadmill, walking the stairs  Strength Training: no,     REVIEW OF SYSTEMS  As above  PHYSICAL EXAM: (most recent vitals and today's stated weight)  Vitals: Ht 1.651 m (5' 5\")   Wt 149.7 kg (330 lb)   LMP " 11/01/2011   BMI 54.91 kg/m        GEN: Pleasant and in no acute distress  PSYCH: A&OX3,     I have reviewed the note as documented above.  This accurately captures the substance of my conversation with the patient.  Thank you for the opportunity to participate in the care of your patient.    Georgia Esquivel MD, FAAFP  [facility]  Diplomate, American Board of Obesity Medicine    Total time spent on the date of this encounter doing: chart review, review of test results, patient visit, physical exam, education, counseling, developing plan of care, and documenting = 30 minutes.        Again, thank you for allowing me to participate in the care of your patient.        Sincerely,        Georgia Esquivel MD

## 2024-08-05 ENCOUNTER — TELEPHONE (OUTPATIENT)
Dept: FAMILY MEDICINE | Facility: CLINIC | Age: 64
End: 2024-08-05
Payer: COMMERCIAL

## 2024-08-05 NOTE — LETTER
August 5, 2024    To  Ludmila Sanchez  [address]  [place] [address]    Your team at [facility] cares about your health. We have reviewed your chart and based on our findings; we are making the following recommendations to better manage your health.     You are in particular need of attention regarding the following:     PREVENTATIVE VISIT: Annual Medicare Wellness:Schedule an Annual Medicare Wellness Exam. Please call your [facility] clinic to set up your appointment.    Please call [phone] to schedule.    If you have already completed these items, please contact the clinic via phone or   [employer] so your care team can review and update your records. Thank you for   choosing [facility] for your healthcare needs. For any questions,   concerns, or to schedule an appointment please contact our clinic.    Healthy Regards,      Your [facility] Care Team

## 2024-08-05 NOTE — TELEPHONE ENCOUNTER
Patient Quality Outreach    Patient is due for the following:   Physical Annual Wellness Visit    Next Steps:   See below    Type of outreach:    Sent letter.    Next Steps:  Reach out within 90 days via  done .    Max number of attempts reached: Yes. Will try again in 90 days if patient still on fail list.    Questions for provider review:    None           Perla Vaz, [facility]  Chart routed to Closing encounter  .

## 2024-08-24 SDOH — HEALTH STABILITY: PHYSICAL HEALTH: ON AVERAGE, HOW MANY DAYS PER WEEK DO YOU ENGAGE IN MODERATE TO STRENUOUS EXERCISE (LIKE A BRISK WALK)?: 0 DAYS

## 2024-08-24 SDOH — HEALTH STABILITY: PHYSICAL HEALTH: ON AVERAGE, HOW MANY MINUTES DO YOU ENGAGE IN EXERCISE AT THIS LEVEL?: 0 MIN

## 2024-08-24 ASSESSMENT — SOCIAL DETERMINANTS OF HEALTH (SDOH): HOW OFTEN DO YOU GET TOGETHER WITH FRIENDS OR RELATIVES?: ONCE A WEEK

## 2024-08-29 ENCOUNTER — OFFICE VISIT (OUTPATIENT)
Dept: FAMILY MEDICINE | Facility: CLINIC | Age: 64
End: 2024-08-29
Payer: COMMERCIAL

## 2024-08-29 VITALS
TEMPERATURE: 97.1 F | SYSTOLIC BLOOD PRESSURE: 138 MMHG | OXYGEN SATURATION: 96 % | WEIGHT: 293 LBS | RESPIRATION RATE: 18 BRPM | HEART RATE: 85 BPM | HEIGHT: 65 IN | DIASTOLIC BLOOD PRESSURE: 88 MMHG | BODY MASS INDEX: 48.82 KG/M2

## 2024-08-29 DIAGNOSIS — F33.9 RECURRENT MAJOR DEPRESSIVE DISORDER, REMISSION STATUS UNSPECIFIED (H): ICD-10-CM

## 2024-08-29 DIAGNOSIS — F31.31 BIPOLAR AFFECTIVE DISORDER, CURRENTLY DEPRESSED, MILD (H): ICD-10-CM

## 2024-08-29 DIAGNOSIS — F43.21 GRIEF: ICD-10-CM

## 2024-08-29 DIAGNOSIS — I10 HYPERTENSION GOAL BP (BLOOD PRESSURE) < 140/90: ICD-10-CM

## 2024-08-29 DIAGNOSIS — M54.50 ACUTE LEFT-SIDED LOW BACK PAIN WITHOUT SCIATICA: ICD-10-CM

## 2024-08-29 DIAGNOSIS — Z00.00 ENCOUNTER FOR MEDICARE ANNUAL WELLNESS EXAM: ICD-10-CM

## 2024-08-29 DIAGNOSIS — G47.33 OSA (OBSTRUCTIVE SLEEP APNEA): ICD-10-CM

## 2024-08-29 DIAGNOSIS — E66.01 MORBID OBESITY DUE TO EXCESS CALORIES (H): ICD-10-CM

## 2024-08-29 DIAGNOSIS — E78.5 DYSLIPIDEMIA: Primary | ICD-10-CM

## 2024-08-29 DIAGNOSIS — E03.9 ACQUIRED HYPOTHYROIDISM: ICD-10-CM

## 2024-08-29 DIAGNOSIS — Z29.11 NEED FOR VACCINATION AGAINST RESPIRATORY SYNCYTIAL VIRUS: ICD-10-CM

## 2024-08-29 PROBLEM — Z86.79 HISTORY OF HYPERTENSION: Status: RESOLVED | Noted: 2020-01-09 | Resolved: 2024-08-29

## 2024-08-29 LAB
ALBUMIN UR-MCNC: NEGATIVE MG/DL
APPEARANCE UR: CLEAR
BILIRUB UR QL STRIP: NEGATIVE
COLOR UR AUTO: YELLOW
GLUCOSE UR STRIP-MCNC: NEGATIVE MG/DL
HGB UR QL STRIP: NEGATIVE
KETONES UR STRIP-MCNC: NEGATIVE MG/DL
LEUKOCYTE ESTERASE UR QL STRIP: NEGATIVE
NITRATE UR QL: NEGATIVE
PH UR STRIP: 6 [PH] (ref 5–7)
SP GR UR STRIP: >=1.03 (ref 1–1.03)
UROBILINOGEN UR STRIP-ACNC: 0.2 E.U./DL

## 2024-08-29 PROCEDURE — 99214 OFFICE O/P EST MOD 30 MIN: CPT | Mod: 25 | Performed by: FAMILY MEDICINE

## 2024-08-29 PROCEDURE — 81003 URINALYSIS AUTO W/O SCOPE: CPT | Performed by: FAMILY MEDICINE

## 2024-08-29 PROCEDURE — G0438 PPPS, INITIAL VISIT: HCPCS | Performed by: FAMILY MEDICINE

## 2024-08-29 RX ORDER — LOSARTAN POTASSIUM 25 MG/1
25 TABLET ORAL DAILY
Qty: 30 TABLET | Refills: 2 | Status: CANCELLED | OUTPATIENT
Start: 2024-08-29

## 2024-08-29 RX ORDER — NAPROXEN 500 MG/1
500 TABLET ORAL 2 TIMES DAILY WITH MEALS
Qty: 30 TABLET | Refills: 0 | Status: SHIPPED | OUTPATIENT
Start: 2024-08-29

## 2024-08-29 RX ORDER — METHOCARBAMOL 750 MG/1
750 TABLET, FILM COATED ORAL 3 TIMES DAILY
Qty: 30 TABLET | Refills: 0 | Status: SHIPPED | OUTPATIENT
Start: 2024-08-29

## 2024-08-29 RX ORDER — LEVOTHYROXINE SODIUM 25 UG/1
25 TABLET ORAL DAILY
Qty: 90 TABLET | Refills: 3 | Status: SHIPPED | OUTPATIENT
Start: 2024-08-29

## 2024-08-29 RX ORDER — LOSARTAN POTASSIUM 25 MG/1
25 TABLET ORAL DAILY
Qty: 30 TABLET | Refills: 2 | Status: SHIPPED | OUTPATIENT
Start: 2024-08-29 | End: 2024-08-29

## 2024-08-29 RX ORDER — SERTRALINE HYDROCHLORIDE 100 MG/1
100 TABLET, FILM COATED ORAL DAILY
Qty: 90 TABLET | Refills: 1 | Status: SHIPPED | OUTPATIENT
Start: 2024-08-29

## 2024-08-29 RX ORDER — LOSARTAN POTASSIUM 50 MG/1
50 TABLET ORAL DAILY
Qty: 30 TABLET | Refills: 0 | Status: SHIPPED | OUTPATIENT
Start: 2024-08-29 | End: 2024-09-30

## 2024-08-29 ASSESSMENT — ENCOUNTER SYMPTOMS: BACK PAIN: 1

## 2024-08-29 ASSESSMENT — PATIENT HEALTH QUESTIONNAIRE - PHQ9
SUM OF ALL RESPONSES TO PHQ QUESTIONS 1-9: 2
10. IF YOU CHECKED OFF ANY PROBLEMS, HOW DIFFICULT HAVE THESE PROBLEMS MADE IT FOR YOU TO DO YOUR WORK, TAKE CARE OF THINGS AT HOME, OR GET ALONG WITH OTHER PEOPLE: NOT DIFFICULT AT ALL
SUM OF ALL RESPONSES TO PHQ QUESTIONS 1-9: 2

## 2024-08-29 ASSESSMENT — PAIN SCALES - GENERAL: PAINLEVEL: WORST PAIN (10)
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
## 2024-08-29 NOTE — PATIENT INSTRUCTIONS
When You Have Low Back Pain    Caring for Your Back  You are not alone.    Low back pain is very common. Nearly half of all adults have low back pain in any given year. The good news is that back pain is rarely a danger to your health. Most people can manage their back pain on their own and about half of them start feeling better within 2 weeks. In 9 out of 10 cases, low back pain goes away or no longer limits daily activity within 6 weeks.     Your outlook is good!    Your symptoms tell us that your low back pain is most likely not a danger to you. Most of the time we do not know the exact cause of low back pain, even if you see a doctor or have an MRI. However, treatment can still work without knowing the cause of the pain. Less than 1 in 100 people need surgery for their back pain.     What can I do about my low back pain?     There are three things you can do to ease low back pain and help it go away.   Use heat or cold packs.   Take medicine as directed.   Use positions, movements and exercises.     Using heat or cold packs    Try cold packs or gentle heat to ease your pain. Use whichever gives you the most relief. Apply the cold pack or heat for 15 minutes at a time, as often as needed.    Taking medicine     If your doctor has prescribed medicine, be sure to follow the directions.   If you take over-the-counter medicine, read and follow the directions.   Talk to your doctor if you have any questions.     Using positions, movements and exercises    Research tells us that moving your joints and muscles can help you recover from back pain. Such activity should be simple and gentle. Use the positions in the photos as well as walking to help relieve your pain. Try taking a short walk every 3 to 4 hours during the day. Walk for a few minutes inside your home or take longer walks outside, on a treadmill or at a mall. Slowly increase the amount of time you walk. Expect discomfort when you begin, but it should lessen  as your back starts to heal. When your back feels better, walk daily to keep your back and body healthy.    Finding a comfortable position    When your back pain is new, certain positions will ease your pain. Gently try each of the positions below until you find one that is helpful. Once you find a position of comfort, use it as often as you like when you are resting. You will recover faster if you combine rest with activity.         Lie on your back with your legs bent. You can do this by placing a pillow under your knees. Or you may lie on the floor and rest your lower legs on the seat of a chair.       Lie on your side with your knees bent, and place a pillow between your knees.       Lie on your stomach over pillows.      When should I call my doctor?    Your back pain should improve over the first couple of weeks. As it improves, you should be able to return to your normal activities. But call your doctor if:   You have a sudden change in your ability to control your bladder or bowels.   You feel tingling in your groin or legs.   The pain spreads down your leg and into your foot.   Your toes, feet or leg muscles feel weak.   You feel generally unwell or sick.   Your pain does not get better or gets worse.      If you are deaf or hard of hearing, please let us know. We provide many free services including sign language interpreters,oral interpreters, TTYs, telephone amplifiers, note takers and written materials.    For informational purposes only. Not to replace the advice of your health care provider. Copyright   2013 [facility]. All rights reserved. [employer] 139413 - Rev 06/14.    Patient Education   Preventive Care Advice   This is general advice given by our system to help you stay healthy. However, your care team may have specific advice just for you. Please talk to your care team about your preventive care needs.  Nutrition  Eat 5 or more servings of fruits and vegetables each day.  Try  wheat bread, brown rice and whole grain pasta (instead of white bread, rice, and pasta).  Get enough calcium and vitamin D. Check the label on foods and aim for 100% of the RDA (recommended daily allowance).  Lifestyle  Exercise at least 150 minutes each week  (30 minutes a day, 5 days a week).  Do muscle strengthening activities 2 days a week. These help control your weight and prevent disease.  No smoking.  Wear sunscreen to prevent skin cancer.  Have a dental exam and cleaning every 6 months.  Yearly exams  See your health care team every year to talk about:  Any changes in your health.  Any medicines your care team has prescribed.  Preventive care, family planning, and ways to prevent chronic diseases.  Shots (vaccines)   HPV shots (up to age 26), if you've never had them before.  Hepatitis B shots (up to age 59), if you've never had them before.  COVID-19 shot: Get this shot when it's due.  Flu shot: Get a flu shot every year.  Tetanus shot: Get a tetanus shot every 10 years.  Pneumococcal, hepatitis A, and RSV shots: Ask your care team if you need these based on your risk.  Shingles shot (for age 50 and up)  General health tests  Diabetes screening:  Starting at age 35, Get screened for diabetes at least every 3 years.  If you are younger than age 35, ask your care team if you should be screened for diabetes.  Cholesterol test: At age 39, start having a cholesterol test every 5 years, or more often if advised.  Bone density scan (DEXA): At age 50, ask your care team if you should have this scan for osteoporosis (brittle bones).  Hepatitis C: Get tested at least once in your life.  STIs (sexually transmitted infections)  Before age 24: Ask your care team if you should be screened for STIs.  After age 24: Get screened for STIs if you're at risk. You are at risk for STIs (including HIV) if:  You are sexually active with more than one person.  You don't use condoms every time.  You or a partner was diagnosed with  a sexually transmitted infection.  If you are at risk for HIV, ask about PrEP medicine to prevent HIV.  Get tested for HIV at least once in your life, whether you are at risk for HIV or not.  Cancer screening tests  Cervical cancer screening: If you have a cervix, begin getting regular cervical cancer screening tests starting at age 21.  Breast cancer scan (mammogram): If you've ever had breasts, begin having regular mammograms starting at age 40. This is a scan to check for breast cancer.  Colon cancer screening: It is important to start screening for colon cancer at age 45.  Have a colonoscopy test every 10 years (or more often if you're at risk) Or, ask your provider about stool tests like a FIT test every year or Cologuard test every 3 years.  To learn more about your testing options, visit:   .  For help making a decision, visit:   https://bit.ly/gm13574.  Prostate cancer screening test: If you have a prostate, ask your care team if a prostate cancer screening test (PSA) at age 55 is right for you.  Lung cancer screening: If you are a current or former smoker ages 50 to 80, ask your care team if ongoing lung cancer screenings are right for you.  For informational purposes only. Not to replace the advice of your health care provider. Copyright   2023 [facility]. All rights reserved. Clinically reviewed by the [facility] Transitions Program. [employer] 465242 - REV 01/24.  Bladder Training: Care Instructions  Your Care Instructions     Bladder training is used to treat urge incontinence and stress incontinence. Urge incontinence means that the need to urinate comes on so fast that you can't get to a toilet in time. Stress incontinence means that you leak urine because of pressure on your bladder. For example, it may happen when you laugh, cough, or lift something heavy.  Bladder training can increase how long you can wait before you have to urinate. It can also help your bladder hold more  urine. And it can give you better control over the urge to urinate.  It is important to remember that bladder training takes a few weeks to a few months to make a difference. You may not see results right away, but don't give up.  Follow-up care is a key part of your treatment and safety. Be sure to make and go to all appointments, and call your doctor if you are having problems. It's also a good idea to know your test results and keep a list of the medicines you take.  How can you care for yourself at home?  Work with your doctor to come up with a bladder training program that is right for you. You may use one or more of the following methods.  Delayed urination  In the beginning, try to keep from urinating for 5 minutes after you first feel the need to go.  While you wait, take deep, slow breaths to relax. Kegel exercises can also help you delay the need to go to the bathroom.  After some practice, when you can easily wait 5 minutes to urinate, try to wait 10 minutes before you urinate.  Slowly increase the waiting period until you are able to control when you have to urinate.  Scheduled urination  Empty your bladder when you first wake up in the morning.  Schedule times throughout the day when you will urinate.  Start by going to the bathroom every hour, even if you don't need to go.  Slowly increase the time between trips to the bathroom.  When you have found a schedule that works well for you, keep doing it.  If you wake up during the night and have to urinate, do it. Apply your schedule to waking hours only.  Kegel exercises  These tighten and strengthen pelvic muscles, which can help you control the flow of urine. (If doing these exercises causes pain, stop doing them and talk with your doctor.) To do Kegel exercises:  Squeeze your muscles as if you were trying not to pass gas. Or squeeze your muscles as if you were stopping the flow of urine. Your belly, legs, and buttocks shouldn't move.  Hold the squeeze  "for 3 seconds, then relax for 5 to 10 seconds.  Start with 3 seconds, then add 1 second each week until you are able to squeeze for 10 seconds.  Repeat the exercise 10 times a session. Do 3 to 8 sessions a day.  When should you call for help?  Watch closely for changes in your health, and be sure to contact your doctor if:    Your incontinence is getting worse.     You do not get better as expected.   Where can you learn more?  Go to https://www.[employer].net/patiented  Enter V684 in the search box to learn more about \"Bladder Training: Care Instructions.\"  Current as of: November 15, 2023               Content Version: 14.0    3733-9467 [employer].   Care instructions adapted under license by your healthcare professional. If you have questions about a medical condition or this instruction, always ask your healthcare professional. [employer] disclaims any warranty or liability for your use of this information.         "

## 2024-09-30 ENCOUNTER — TELEPHONE (OUTPATIENT)
Dept: FAMILY MEDICINE | Facility: CLINIC | Age: 64
End: 2024-09-30

## 2024-09-30 ENCOUNTER — OFFICE VISIT (OUTPATIENT)
Dept: FAMILY MEDICINE | Facility: CLINIC | Age: 64
End: 2024-09-30
Payer: COMMERCIAL

## 2024-09-30 ENCOUNTER — TELEPHONE (OUTPATIENT)
Dept: PHARMACY | Facility: CLINIC | Age: 64
End: 2024-09-30

## 2024-09-30 VITALS
HEIGHT: 65 IN | TEMPERATURE: 97.2 F | HEART RATE: 92 BPM | DIASTOLIC BLOOD PRESSURE: 76 MMHG | WEIGHT: 293 LBS | BODY MASS INDEX: 48.82 KG/M2 | OXYGEN SATURATION: 96 % | SYSTOLIC BLOOD PRESSURE: 130 MMHG | RESPIRATION RATE: 18 BRPM

## 2024-09-30 DIAGNOSIS — E66.01 CLASS 3 SEVERE OBESITY DUE TO EXCESS CALORIES WITH SERIOUS COMORBIDITY AND BODY MASS INDEX (BMI) OF 50.0 TO 59.9 IN ADULT (H): ICD-10-CM

## 2024-09-30 DIAGNOSIS — I10 HYPERTENSION GOAL BP (BLOOD PRESSURE) < 140/90: ICD-10-CM

## 2024-09-30 DIAGNOSIS — E66.813 CLASS 3 SEVERE OBESITY DUE TO EXCESS CALORIES WITH SERIOUS COMORBIDITY AND BODY MASS INDEX (BMI) OF 50.0 TO 59.9 IN ADULT (H): ICD-10-CM

## 2024-09-30 DIAGNOSIS — R73.09 ELEVATED GLUCOSE: ICD-10-CM

## 2024-09-30 DIAGNOSIS — R73.09 ELEVATED HEMOGLOBIN A1C: ICD-10-CM

## 2024-09-30 DIAGNOSIS — E88.810 METABOLIC SYNDROME: Primary | ICD-10-CM

## 2024-09-30 DIAGNOSIS — E66.01 MORBID OBESITY DUE TO EXCESS CALORIES (H): ICD-10-CM

## 2024-09-30 DIAGNOSIS — Z29.11 NEED FOR VACCINATION AGAINST RESPIRATORY SYNCYTIAL VIRUS: ICD-10-CM

## 2024-09-30 LAB
ALT SERPL W P-5'-P-CCNC: 23 U/L (ref 0–50)
AST SERPL W P-5'-P-CCNC: 22 U/L (ref 0–45)
CREAT SERPL-MCNC: 0.9 MG/DL (ref 0.51–0.95)
EGFRCR SERPLBLD CKD-EPI 2021: 70 ML/MIN/1.73M2
EST. AVERAGE GLUCOSE BLD GHB EST-MCNC: 126 MG/DL
HBA1C MFR BLD: 6 % (ref 0–5.6)
POTASSIUM SERPL-SCNC: 4.1 MMOL/L (ref 3.4–5.3)

## 2024-09-30 PROCEDURE — 84132 ASSAY OF SERUM POTASSIUM: CPT | Performed by: FAMILY MEDICINE

## 2024-09-30 PROCEDURE — 82565 ASSAY OF CREATININE: CPT | Performed by: FAMILY MEDICINE

## 2024-09-30 PROCEDURE — G2211 COMPLEX E/M VISIT ADD ON: HCPCS | Performed by: FAMILY MEDICINE

## 2024-09-30 PROCEDURE — 83036 HEMOGLOBIN GLYCOSYLATED A1C: CPT | Performed by: FAMILY MEDICINE

## 2024-09-30 PROCEDURE — 36415 COLL VENOUS BLD VENIPUNCTURE: CPT | Performed by: FAMILY MEDICINE

## 2024-09-30 PROCEDURE — 84460 ALANINE AMINO (ALT) (SGPT): CPT | Performed by: FAMILY MEDICINE

## 2024-09-30 PROCEDURE — 99214 OFFICE O/P EST MOD 30 MIN: CPT | Performed by: FAMILY MEDICINE

## 2024-09-30 PROCEDURE — 84450 TRANSFERASE (AST) (SGOT): CPT | Performed by: FAMILY MEDICINE

## 2024-09-30 PROCEDURE — 91320 SARSCV2 VAC 30MCG TRS-SUC IM: CPT | Performed by: FAMILY MEDICINE

## 2024-09-30 PROCEDURE — 90480 ADMN SARSCOV2 VAC 1/ONLY CMP: CPT | Performed by: FAMILY MEDICINE

## 2024-09-30 PROCEDURE — G0008 ADMIN INFLUENZA VIRUS VAC: HCPCS | Performed by: FAMILY MEDICINE

## 2024-09-30 PROCEDURE — 90662 IIV NO PRSV INCREASED AG IM: CPT | Performed by: FAMILY MEDICINE

## 2024-09-30 RX ORDER — METFORMIN HCL 500 MG
500 TABLET, EXTENDED RELEASE 24 HR ORAL 2 TIMES DAILY WITH MEALS
Qty: 180 TABLET | Refills: 3 | Status: SHIPPED | OUTPATIENT
Start: 2024-09-30

## 2024-09-30 RX ORDER — LOSARTAN POTASSIUM 50 MG/1
50 TABLET ORAL DAILY
Qty: 90 TABLET | Refills: 3 | Status: SHIPPED | OUTPATIENT
Start: 2024-09-30

## 2024-09-30 ASSESSMENT — PAIN SCALES - GENERAL: PAINLEVEL: NO PAIN (0)

## 2024-09-30 NOTE — TELEPHONE ENCOUNTER
Retail Pharmacy Prior Authorization Team   Phone: [phone]    PRIOR AUTHORIZATION DENIED    Medication: OZEMPIC (0.25 OR 0.5 MG/DOSE) 2 MG/3ML SC [facility]  Insurance Company: [employer] - Phone [phone] Fax [phone]  Denial Date: 9/30/2024  Denial Reason(s): REQUIRES A MEDICALLY ACCEPTED INDICATION  Appeal Information: IF THE PROVIDER WOULD LIKE TO APPEAL THIS DECISION PLEASE PROVIDE THE PA TEAM WITH A LETTER OF MEDICAL NECESSITY  Patient Notified: NO    Denied   9/30/2024 10:38 AM  Appeal supported: No   Note from payer: Document:  Decision Notes: The records sent to us by your doctor or health care provider did not meet the criteria to approve. The drug must be used for a medically accepted indication. Your doctor has asked for Ozempic for abnormal glucose (blood sugar). The drug has not been approved by the [place] Food and Drug Administration (FDA) for the treatment of this health issue. Also, this drug is not an appropriate treatment choice for this health issue, per the Medicare-approved drug information compendia. Therefore, the safety and effectiveness of this drug has either not been established or shown to be inconclusive for treatment of your health issue. A copy of this letter is being sent to your doctor or health care provider.   Payer: Jefry Case ID: [id number]    [phone]

## 2024-09-30 NOTE — TELEPHONE ENCOUNTER
Please initiate a prior authorization    Thank you,  Adriana Brothers, Pharmacy Technician  [facility] [place]

## 2024-09-30 NOTE — LETTER
October 7, 2024      Ludmila Sanchez  [address]  [place] [address]        To Whom It May Concern,     Ludmila Sanchez  has Class 3 severe obesity due to excess calories with serious comorbidity and body mass index (BMI) of 50.0 to 59.9 in adult (H)   I would recommend that her Insurance cover Wegovy to help decrease her ASCVD risk      Sincerely,        Tonia Schmitz MD

## 2024-09-30 NOTE — PROGRESS NOTES
Assessment & Plan     Hypertension goal BP (blood pressure) < 140/90  Stable   - losartan (COZAAR) 50 MG tablet; Take 1 tablet (50 mg) by mouth daily.  - Potassium; Future  - Creatinine; Future  - Potassium  - Creatinine    Elevated glucose  refilled  - metFORMIN (GLUCOPHAGE XR) 500 MG 24 hr tablet; Take 1 tablet (500 mg) by mouth 2 times daily (with meals).  - Hemoglobin A1c; Future  - semaglutide (OZEMPIC) 2 MG/3ML pen; Inject 0.25 mg subcutaneously every 7 days.  - Hemoglobin A1c    Class 3 severe obesity due to excess calories with serious comorbidity and body mass index (BMI) of 50.0 to 59.9 in adult (H)  We discussed medicines for prediabetes and obesity  Discussed GLP1  Discussed Risk of pancreatitis, SBO< gastroparesis  .nmedullary Thyroid ca etc  Advised if any abdominal pain , nausea. GI side effects, lump in the neck stop meds abdomen call  Follow up 1 month  - AST; Future  - ALT; Future  - semaglutide (OZEMPIC) 2 MG/3ML pen; Inject 0.25 mg subcutaneously every 7 days.  - AST  - ALT    Need for vaccination against respiratory syncytial virus  Advised  Discussed vaccines due as in             Follow up 1 month    Subjective   Ludmila is a 64 year old, presenting for the following health issues:  Hypertension        9/30/2024     7:07 AM   Additional Questions   Roomed by Katharine     History of Present Illness       Hypertension: She presents for follow up of hypertension.  She does not check blood pressure  regularly outside of the clinic. Outside blood pressures have been over 140/90. She does not follow a low salt diet.     She eats 0-1 servings of fruits and vegetables daily.She consumes 1 sweetened beverage(s) daily.She exercises with enough effort to increase her heart rate 9 or less minutes per day.  She exercises with enough effort to increase her heart rate 3 or less days per week.                  Review of Systems  Constitutional, neuro, ENT, endocrine, pulmonary, cardiac, gastrointestinal,  "genitourinary, musculoskeletal, integument and psychiatric systems are negative, except as otherwise noted.      Objective    /76   Pulse 92   Temp 97.2  F (36.2  C) (Temporal)   Resp 18   Ht 1.651 m (5' 5\")   Wt (!) 155.1 kg (342 lb)   LMP 11/01/2011   SpO2 96%   BMI 56.91 kg/m    Body mass index is 56.91 kg/m .  Physical Exam   GENERAL: alert and no distress  NECK: no adenopathy, no asymmetry, masses, or scars  RESP: lungs clear to auscultation - no rales, rhonchi or wheezes  CV: regular rate and rhythm, normal S1 S2, no S3 or S4, no murmur, click or rub, no peripheral edema  ABDOMEN: soft, nontender, no hepatosplenomegaly, no masses and bowel sounds normal  MS: no gross musculoskeletal defects noted, no edema    Office Visit on 08/29/2024   Component Date Value Ref Range Status     Color Urine 08/29/2024 Yellow  Colorless, Straw, Light Yellow, Yellow Final     Appearance Urine 08/29/2024 Clear  Clear Final     Glucose Urine 08/29/2024 Negative  Negative mg/dL Final     Bilirubin Urine 08/29/2024 Negative  Negative Final     Ketones Urine 08/29/2024 Negative  Negative mg/dL Final     Specific Gravity Urine 08/29/2024 >=1.030  1.003 - 1.035 Final     Blood Urine 08/29/2024 Negative  Negative Final     pH Urine 08/29/2024 6.0  5.0 - 7.0 FinalThe longitudinal plan of care for the diagnosis(es)/condition(s) as documented were addressed during this visit. Due to the added complexity in care, I will continue to support Ludmila in the subsequent management and with ongoing continuity of care.     Protein Albumin Urine 08/29/2024 Negative  Negative mg/dL Final     Urobilinogen Urine 08/29/2024 0.2  0.2, 1.0 E.U./dL Final     Nitrite Urine 08/29/2024 Negative  Negative Final     Leukocyte Esterase Urine 08/29/2024 Negative  Negative Final           Signed Electronically by: Tonia Schmitz MD    "

## 2024-10-03 NOTE — TELEPHONE ENCOUNTER
Retail Pharmacy Prior Authorization Team   Phone: [phone]    PA Initiation    Medication: WEGOVY 0.25 MG/0.5ML SC SOAJ  Insurance Company: [employer] - Phone [phone] Fax [phone]  Pharmacy Filling the Rx: [facility] [place] - [address]  Filling Pharmacy Phone: [phone]  Filling Pharmacy Fax:    Start Date: 10/3/2024    CAITY MARVIN (Black: [email or web address])

## 2024-10-07 NOTE — TELEPHONE ENCOUNTER
Retail Pharmacy Prior Authorization Team   Phone: [phone]    RESUBMITTED PA WITH OBESITY DX -     PA Initiation    Medication: WEGOVY 0.25 MG/0.5ML SC SOAJ  Insurance Company: [employer] - Phone [phone] Fax [phone]  Pharmacy Filling the Rx: [facility] [place] [place] - [address]  Filling Pharmacy Phone: [phone]  Filling Pharmacy Fax:    Start Date: 10/3/2024    CAITY MARVIN (Black: [id number])

## 2024-10-07 NOTE — TELEPHONE ENCOUNTER
PRIOR AUTHORIZATION DENIED    Medication: WEGOVY 0.25 MG/0.5ML SC SOAJ  Insurance Company: [employer] - Phone [phone] Fax [phone]  Denial Date: 10/6/2024  Denial Reason(s): MUST BE USED FOR A MEDICALLY ACCEPTED INDICATION      Appeal Information: IF THE PROVIDER WOULD LIKE TO APPEAL THIS DECISION PLEASE PROVIDE THE PA TEAM WITH A LETTER OF MEDICAL NECESSITY      Patient Notified: NO

## 2024-10-07 NOTE — TELEPHONE ENCOUNTER
Medication Appeal Initiation    Medication: WEGOVY 0.25 MG/0.5ML SC SOAJ  Appeal Start Date:  10/7/2024  Insurance Company: [employer]  Insurance Phone: [phone]  Insurance Fax: [phone]  Comments:       FAXED LETTER OF MEDICAL NECESSITY AND CHART NOTES TO INSURANCE -

## 2024-10-07 NOTE — TELEPHONE ENCOUNTER
PRIOR AUTHORIZATION FOLLOW-UP  PA request had been closed out on CMM. Called for additional information and spoke with Araseli at [facility] - cannot be resubmitted would need to be appealed. I informed her we had noted that it was being used for morbid obesity in addition to other dx. She states that the drug is covered for obesity and if we do submit for an appeal to submit only with that dx.

## 2024-10-09 NOTE — TELEPHONE ENCOUNTER
MEDICATION APPEAL DENIED    Medication: WEGOVY 0.25 MG/0.5ML SC SOAJ  Insurance Company: [employer]  Denial Date: 10/9/2024  Denial Reason(s): DRUGS USED FOR OBESITY AND WEIGHT LOSS ARE EXCLUDED      Second Level Appeal Information:       Patient Notified: NO  Central Prior Authorization Team ONLY: Second level appeals will be managed by the clinic staff and provider. Please contact the [employer] Prior Authorization Team if additional information about the denial is needed.

## 2024-10-15 ENCOUNTER — TELEPHONE (OUTPATIENT)
Dept: FAMILY MEDICINE | Facility: CLINIC | Age: 64
End: 2024-10-15
Payer: COMMERCIAL

## 2024-10-15 NOTE — TELEPHONE ENCOUNTER
Please initiate a prior authorization    Thank you,  Adriana Brothers, Pharmacy Technician  [facility]

## 2024-11-21 ENCOUNTER — VIRTUAL VISIT (OUTPATIENT)
Dept: FAMILY MEDICINE | Facility: CLINIC | Age: 64
End: 2024-11-21
Payer: COMMERCIAL

## 2024-11-21 DIAGNOSIS — B34.9 VIRAL ILLNESS: Primary | ICD-10-CM

## 2024-11-21 DIAGNOSIS — R05.1 ACUTE COUGH: ICD-10-CM

## 2024-11-21 ASSESSMENT — ENCOUNTER SYMPTOMS: COUGH: 1

## 2024-11-21 NOTE — PROGRESS NOTES
Ludmila is a 64 year old who is being evaluated via a billable video visit.    How would you like to obtain your AVS? MyChart  If the video visit is dropped, the invitation should be resent by: Text to cell phone: [phone]  Will anyone else be joining your video visit? No      Assessment & Plan     Viral illness  Acute cough  Patient needing note for work. Reports gradual improvement in symptoms. Discussed supportive care. She has no additional questions.           The longitudinal plan of care for the diagnosis(es)/condition(s) as documented were addressed during this visit. Due to the added complexity in care, I will continue to support Ludmila in the subsequent management and with ongoing continuity of care.        Subjective   Ludmila is a 64 year old, presenting for the following health issues:  Cough (5 days) and Patient Request for Note/Letter (Needs a note for work)      11/21/2024     2:46 PM   Additional Questions   Roomed by An V.         11/21/2024     2:46 PM   Patient Reported Additional Medications   Patient reports taking the following new medications none     Video Start Time: 3:21 PM    Cough    History of Present Illness       Reason for visit:  Need doctor note that I have been out ill with cough for 3 days.  Symptom onset:  3-7 days ago  Symptoms include:  Coughing, temperature sore throat  Symptom intensity:  Moderate  Symptom progression:  Improving  Had these symptoms before:  Yes  Has tried/received treatment for these symptoms:  Yes  Previous treatment was successful:  Yes  Prior treatment description:  Rest, Tylenol  What makes it worse:  No  What makes it better:  Hot tea, rest and tylenol   She is taking medications regularly.       Acute Illness  Acute illness concerns: Cough  Onset/Duration: 5 days  Symptoms:  Fever: No currently but at onset of symptoms had a low grade fever of 99  Chills/Sweats: YES- chills and sweats  Headache (location?): YES- slightly  Sinus Pressure:  No  Conjunctivitis:  No  Ear Pain: no  Rhinorrhea: No  Congestion: No  Sore Throat: YES  Cough: YES-dry cough  Wheeze: No  Decreased Appetite: YES  Nausea: No  Vomiting: No  Diarrhea: No  Dysuria/Freq.: No  Dysuria or Hematuria: No  Fatigue/Achiness: YES- fatigue   Sick/Strep Exposure: YES- grandson was sick and daughter dx with pneumonia   Therapies tried and outcome: tylenol and cough drops        Works at [employer].   Missed Tues, Wed, Thurs.   Did covid test.     Some issues with sleeping.           Objective           Vitals:  No vitals were obtained today due to virtual visit.    Physical Exam   GENERAL: alert and no distress  EYES: Eyes grossly normal to inspection.  No discharge or erythema, or obvious scleral/conjunctival abnormalities.  RESP: No audible wheeze, cough, or visible cyanosis.    SKIN: Visible skin clear. No significant rash, abnormal pigmentation or lesions.  NEURO: Cranial nerves grossly intact.  Mentation and speech appropriate for age.  PSYCH: Appropriate affect, tone, and pace of words          Video-Visit Details    Type of service:  Video Visit   Video End Time: 3:31 PM  Originating Location (pt. Location): Home    Distant Location (provider location):  On-site  Platform used for Video Visit: Kit  Signed Electronically by: Kristie Gupta PA-C

## 2024-11-21 NOTE — LETTER
2024    Ludmila Sanchez   1960        To Whom it May Concern;    Please excuse Ludmila Sanchez from work due to illness starting 24 through 24. She may return 24 without restrictions.    Sincerely,        Kristie Gupta PA-C

## 2024-11-26 ENCOUNTER — APPOINTMENT (OUTPATIENT)
Dept: RADIOLOGY | Facility: HOSPITAL | Age: 64
End: 2024-11-26
Attending: EMERGENCY MEDICINE
Payer: COMMERCIAL

## 2024-11-26 ENCOUNTER — HOSPITAL ENCOUNTER (EMERGENCY)
Facility: HOSPITAL | Age: 64
Discharge: HOME | End: 2024-11-26
Attending: EMERGENCY MEDICINE | Admitting: EMERGENCY MEDICINE
Payer: COMMERCIAL

## 2024-11-26 VITALS
HEART RATE: 71 BPM | WEIGHT: 293 LBS | TEMPERATURE: 97.5 F | RESPIRATION RATE: 19 BRPM | SYSTOLIC BLOOD PRESSURE: 159 MMHG | DIASTOLIC BLOOD PRESSURE: 70 MMHG | OXYGEN SATURATION: 96 % | BODY MASS INDEX: 48.82 KG/M2 | HEIGHT: 65 IN

## 2024-11-26 DIAGNOSIS — J20.9 ACUTE BRONCHITIS, UNSPECIFIED ORGANISM: ICD-10-CM

## 2024-11-26 LAB
ALBUMIN UR-MCNC: NEGATIVE MG/DL
ANION GAP SERPL CALCULATED.3IONS-SCNC: 13 MMOL/L (ref 7–15)
APPEARANCE UR: CLEAR
ATRIAL RATE - MUSE: 66 BPM
BASOPHILS # BLD AUTO: 0.1 10E3/UL (ref 0–0.2)
BASOPHILS NFR BLD AUTO: 1 %
BILIRUB UR QL STRIP: NEGATIVE
BUN SERPL-MCNC: 18.8 MG/DL (ref 8–23)
CALCIUM SERPL-MCNC: 9.2 MG/DL (ref 8.8–10.4)
CHLORIDE SERPL-SCNC: 102 MMOL/L (ref 98–107)
COLOR UR AUTO: COLORLESS
CREAT SERPL-MCNC: 0.93 MG/DL (ref 0.51–0.95)
DIASTOLIC BLOOD PRESSURE - MUSE: 58 MMHG
EGFRCR SERPLBLD CKD-EPI 2021: 67 ML/MIN/1.73M2
EOSINOPHIL # BLD AUTO: 0.4 10E3/UL (ref 0–0.7)
EOSINOPHIL NFR BLD AUTO: 4 %
ERYTHROCYTE [DISTWIDTH] IN BLOOD BY AUTOMATED COUNT: 13.2 % (ref 10–15)
FLUAV RNA SPEC QL NAA+PROBE: NEGATIVE
FLUBV RNA RESP QL NAA+PROBE: NEGATIVE
GLUCOSE SERPL-MCNC: 95 MG/DL (ref 70–99)
GLUCOSE UR STRIP-MCNC: NEGATIVE MG/DL
HCO3 SERPL-SCNC: 26 MMOL/L (ref 22–29)
HCT VFR BLD AUTO: 41.8 % (ref 35–47)
HGB BLD-MCNC: 13.3 G/DL (ref 11.7–15.7)
HGB UR QL STRIP: NEGATIVE
HOLD SPECIMEN: NORMAL
IMM GRANULOCYTES # BLD: 0.1 10E3/UL
IMM GRANULOCYTES NFR BLD: 1 %
INTERPRETATION ECG - MUSE: NORMAL
KETONES UR STRIP-MCNC: NEGATIVE MG/DL
LEUKOCYTE ESTERASE UR QL STRIP: NEGATIVE
LYMPHOCYTES # BLD AUTO: 3.6 10E3/UL (ref 0.8–5.3)
LYMPHOCYTES NFR BLD AUTO: 33 %
MCH RBC QN AUTO: 28.4 PG (ref 26.5–33)
MCHC RBC AUTO-ENTMCNC: 31.8 G/DL (ref 31.5–36.5)
MCV RBC AUTO: 89 FL (ref 78–100)
MONOCYTES # BLD AUTO: 0.5 10E3/UL (ref 0–1.3)
MONOCYTES NFR BLD AUTO: 5 %
MUCOUS THREADS #/AREA URNS LPF: PRESENT /LPF
NEUTROPHILS # BLD AUTO: 6.3 10E3/UL (ref 1.6–8.3)
NEUTROPHILS NFR BLD AUTO: 57 %
NITRATE UR QL: NEGATIVE
NRBC # BLD AUTO: 0 10E3/UL
NRBC BLD AUTO-RTO: 0 /100
P AXIS - MUSE: 68 DEGREES
PH UR STRIP: 6 [PH] (ref 5–7)
PLATELET # BLD AUTO: 290 10E3/UL (ref 150–450)
POTASSIUM SERPL-SCNC: 4 MMOL/L (ref 3.4–5.3)
PR INTERVAL - MUSE: 144 MS
QRS DURATION - MUSE: 78 MS
QT - MUSE: 420 MS
QTC - MUSE: 440 MS
R AXIS - MUSE: 47 DEGREES
RBC # BLD AUTO: 4.68 10E6/UL (ref 3.8–5.2)
RBC URINE: 0 /HPF
RSV RNA SPEC NAA+PROBE: NEGATIVE
SARS-COV-2 RNA RESP QL NAA+PROBE: NEGATIVE
SODIUM SERPL-SCNC: 141 MMOL/L (ref 135–145)
SP GR UR STRIP: 1.01 (ref 1–1.03)
SYSTOLIC BLOOD PRESSURE - MUSE: 134 MMHG
T AXIS - MUSE: 39 DEGREES
TROPONIN T SERPL HS-MCNC: 6 NG/L
TROPONIN T SERPL HS-MCNC: <6 NG/L
UROBILINOGEN UR STRIP-MCNC: <2 MG/DL
VENTRICULAR RATE- MUSE: 66 BPM
WBC # BLD AUTO: 10.9 10E3/UL (ref 4–11)
WBC URINE: <1 /HPF

## 2024-11-26 PROCEDURE — 99285 EMERGENCY DEPT VISIT HI MDM: CPT | Mod: 25

## 2024-11-26 PROCEDURE — 85025 COMPLETE CBC W/AUTO DIFF WBC: CPT | Performed by: EMERGENCY MEDICINE

## 2024-11-26 PROCEDURE — 80048 BASIC METABOLIC PNL TOTAL CA: CPT | Performed by: EMERGENCY MEDICINE

## 2024-11-26 PROCEDURE — 84484 ASSAY OF TROPONIN QUANT: CPT | Performed by: EMERGENCY MEDICINE

## 2024-11-26 PROCEDURE — 81003 URINALYSIS AUTO W/O SCOPE: CPT | Performed by: STUDENT IN AN ORGANIZED HEALTH CARE EDUCATION/TRAINING PROGRAM

## 2024-11-26 PROCEDURE — 36415 COLL VENOUS BLD VENIPUNCTURE: CPT | Performed by: EMERGENCY MEDICINE

## 2024-11-26 PROCEDURE — 87637 SARSCOV2&INF A&B&RSV AMP PRB: CPT | Performed by: EMERGENCY MEDICINE

## 2024-11-26 PROCEDURE — 94640 AIRWAY INHALATION TREATMENT: CPT

## 2024-11-26 PROCEDURE — 250N000009 HC RX 250: Performed by: EMERGENCY MEDICINE

## 2024-11-26 PROCEDURE — 71045 X-RAY EXAM CHEST 1 VIEW: CPT

## 2024-11-26 PROCEDURE — 93005 ELECTROCARDIOGRAM TRACING: CPT | Performed by: EMERGENCY MEDICINE

## 2024-11-26 RX ORDER — AZITHROMYCIN 250 MG/1
TABLET, FILM COATED ORAL
Qty: 6 TABLET | Refills: 0 | Status: SHIPPED | OUTPATIENT
Start: 2024-11-26 | End: 2024-12-01

## 2024-11-26 RX ORDER — ALBUTEROL SULFATE 90 UG/1
2 INHALANT RESPIRATORY (INHALATION) EVERY 6 HOURS PRN
Qty: 18 G | Refills: 0 | Status: SHIPPED | OUTPATIENT
Start: 2024-11-26

## 2024-11-26 RX ORDER — IPRATROPIUM BROMIDE AND ALBUTEROL SULFATE 2.5; .5 MG/3ML; MG/3ML
3 SOLUTION RESPIRATORY (INHALATION) ONCE
Status: COMPLETED | OUTPATIENT
Start: 2024-11-26 | End: 2024-11-26

## 2024-11-26 RX ADMIN — IPRATROPIUM BROMIDE AND ALBUTEROL SULFATE 3 ML: .5; 3 SOLUTION RESPIRATORY (INHALATION) at 19:34

## 2024-11-26 ASSESSMENT — COLUMBIA-SUICIDE SEVERITY RATING SCALE - C-SSRS
1. IN THE PAST MONTH, HAVE YOU WISHED YOU WERE DEAD OR WISHED YOU COULD GO TO SLEEP AND NOT WAKE UP?: NO
6. HAVE YOU EVER DONE ANYTHING, STARTED TO DO ANYTHING, OR PREPARED TO DO ANYTHING TO END YOUR LIFE?: NO
2. HAVE YOU ACTUALLY HAD ANY THOUGHTS OF KILLING YOURSELF IN THE PAST MONTH?: NO

## 2024-11-26 ASSESSMENT — ACTIVITIES OF DAILY LIVING (ADL)
ADLS_ACUITY_SCORE: 41

## 2024-11-27 NOTE — ED NOTES
Pt got up and ambulated in hallway independent with 02 set between 97% and 98%. No dizzy and no headaches. Notified current MD.

## 2024-11-27 NOTE — ED NOTES
EMERGENCY DEPARTMENT SIGN OUT NOTE        ED COURSE AND MEDICAL DECISION MAKING  Patient was signed out to me by Dr Lexx Goddard at 10:10 PM    In brief, Ludmila Sanchez is a 64 year old female who initially presented for evaluation of 1 week of cough with shortness of breath and lightheadedness.     At time of sign out, disposition was pending delta troponin.    ED Course as of 11/26/24 2229   Tue Nov 26, 2024   2210 F/up repeat troponin. Likely bronchitis. If unchanged trop, to home.   2228 Troponin undetectable.  Will discharge at this time with return precautions.         FINAL IMPRESSION    1. Acute bronchitis, unspecified organism        ED MEDS  Medications   ipratropium - albuterol 0.5 mg/2.5 mg/3 mL (DUONEB) neb solution 3 mL (3 mLs Nebulization $Given 11/26/24 1934)       LAB  Labs Ordered and Resulted from Time of ED Arrival to Time of ED Departure   ROUTINE UA WITH MICROSCOPIC REFLEX TO CULTURE - Abnormal       Result Value    Color Urine Colorless      Appearance Urine Clear      Glucose Urine Negative      Bilirubin Urine Negative      Ketones Urine Negative      Specific Gravity Urine 1.009      Blood Urine Negative      pH Urine 6.0      Protein Albumin Urine Negative      Urobilinogen Urine <2.0      Nitrite Urine Negative      Leukocyte Esterase Urine Negative      Mucus Urine Present (*)     RBC Urine 0      WBC Urine <1     BASIC METABOLIC PANEL - Normal    Sodium 141      Potassium 4.0      Chloride 102      Carbon Dioxide (CO2) 26      Anion Gap 13      Urea Nitrogen 18.8      Creatinine 0.93      GFR Estimate 67      Calcium 9.2      Glucose 95     TROPONIN T, HIGH SENSITIVITY - Normal    Troponin T, High Sensitivity 6     INFLUENZA A/B, RSV AND SARS-COV2 PCR - Normal    Influenza A PCR Negative      Influenza B PCR Negative      RSV PCR Negative      SARS CoV2 PCR Negative     TROPONIN T, HIGH SENSITIVITY - Normal    Troponin T, High Sensitivity <6     CBC WITH PLATELETS AND DIFFERENTIAL    WBC  Count 10.9      RBC Count 4.68      Hemoglobin 13.3      Hematocrit 41.8      MCV 89      MCH 28.4      MCHC 31.8      RDW 13.2      Platelet Count 290      % Neutrophils 57      % Lymphocytes 33      % Monocytes 5      % Eosinophils 4      % Basophils 1      % Immature Granulocytes 1      NRBCs per 100 WBC 0      Absolute Neutrophils 6.3      Absolute Lymphocytes 3.6      Absolute Monocytes 0.5      Absolute Eosinophils 0.4      Absolute Basophils 0.1      Absolute Immature Granulocytes 0.1      Absolute NRBCs 0.0         EKG  None    RADIOLOGY    XR Chest Port 1 View   Final Result   IMPRESSION: No evidence of acute cardiopulmonary disease. No significant interval change.          DISCHARGE MEDS  New Prescriptions    ALBUTEROL (PROAIR HFA/PROVENTIL HFA/VENTOLIN HFA) 108 (90 BASE) MCG/ACT INHALER    Inhale 2 puffs into the lungs every 6 hours as needed for shortness of breath, wheezing or cough.    AZITHROMYCIN (ZITHROMAX) 250 MG TABLET    Take 2 tablets (500 mg) by mouth daily for 1 day, THEN 1 tablet (250 mg) daily for 4 days.       Seth Shields MD  [facility] EMERGENCY DEPARTMENT  [facility] [facility] [place] [address] [phone]         Seth Shields MD  11/26/24 0199
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
## 2024-11-27 NOTE — ED TRIAGE NOTES
Triage Assessment (Adult)       Row Name 11/26/24 1845          Triage Assessment    Airway WDL WDL        Respiratory WDL    Respiratory WDL cough        Skin Circulation/Temperature WDL    Skin Circulation/Temperature WDL circulation        Cardiac WDL    Cardiac WDL --  dizzy        Peripheral/Neurovascular WDL    Peripheral Neurovascular WDL X        Cognitive/Neuro/Behavioral WDL    Cognitive/Neuro/Behavioral WDL WDL                 Pt  reports of feeling lightheaded at work.  Pt's b/p was high 184/88. Pt also has cough for one week.

## 2024-11-27 NOTE — ED NOTES
Bed: JNED-27  Expected date:   Expected time:   Means of arrival:   Comments:  Viola DaleyF Lightheaded

## 2025-02-10 ENCOUNTER — NURSE TRIAGE (OUTPATIENT)
Dept: FAMILY MEDICINE | Facility: CLINIC | Age: 65
End: 2025-02-10
Payer: COMMERCIAL

## 2025-02-10 DIAGNOSIS — J11.1 INFLUENZA WITH RESPIRATORY MANIFESTATION OTHER THAN PNEUMONIA: Primary | ICD-10-CM

## 2025-02-10 RX ORDER — OSELTAMIVIR PHOSPHATE 75 MG/1
75 CAPSULE ORAL 2 TIMES DAILY
Qty: 10 CAPSULE | Refills: 0 | Status: SHIPPED | OUTPATIENT
Start: 2025-02-10 | End: 2025-02-15

## 2025-02-10 NOTE — TELEPHONE ENCOUNTER
1/27 grandson flu positive    Coughing, SOB  Had bronchitis 11/26, mucinex, tylenol      Influenza-Like Illness (JR) RN Standing Order (13+)    Ludmila Sanchez      Age: 64 year old     YOB: 1960    Patient has been triaged using Epic triage guidelines: Patient does not need higher level of care     Has the patient been seen at a [facility] or [facility] (established in primary or specialty care) in the last two years? Yes     Does the patient have symptoms or been exposed to a confirmed case of influenza?  Symptoms- patient has JR symptoms that started <48 hours ago and has had close contact with a confirmed case of influenza within 5 days.     Since this patient has symptoms, does the patient have ANY of the following?  Younger than 5 years of age or age 65 and older Yes   Chronic pulmonary disease such as asthma or COPD No   Heart disease (CHF, CAD, anticoagulation d/t arrhytmia, congenital heart anomaly) *HTN alone is excluded No   Kidney disease insufficiency No   Hepatic or Hematologic disorder (e.g. chronic liver disease patient, sickle cell disease) No   Diabetes (Type 1 or Type 2) No   Neurologic and Neurodevelopment Conditions (including disorders of the brain, spinal cord, peripheral nerve, and muscle, such as cerebral palsy, epilepsy (seizure disorders), stroke, intellectual disability, moderate to severe developmental delay, muscular dystrophy, or spinal cord injury) No   Obese with BMI >40 No   Immunosuppression caused by medications such as those taking prednisone in excess of 20mg daily, or caused by HIV/AIDS with CD4 count more than 200 No   Is pregnant, may be pregnant, or is within two weeks after delivery No   Is a resident of a chronic care facility No   Is the patient considered a non- Black,  or , or  or  racial or ethnic minority group? No   Is <19 years old and is receiving long term aspirin- or salicylate-containing  medications No     Does this patient have ANY of the above conditions? Yes     Do any of the following exclusions apply to the patient?    Patient reports a positive Covid-19 test:   (Encourage at home COVID-19 test)   No   Reported Tamiflu allergy or intolerance    No   Does the patient have a history of CrCl less than or equal to 60 ml/min in the previous 12 months?    Estimated Creatinine Clearance: 89.4 mL/min (based on SCr of 0.93 mg/dL).  *If no CrCl on file, proceed with protocol No   Is the patient taking Probenecid?     (Probencecid & Tamiflu together are not recommended) No     Does this patient have ANY of the above exclusions answered Yes? No     Since this patient has symptoms, do they have ANY of the following conditions?  Chemotherapy or radiation within the last 3 months No   Organ or bone marrow transplant No   Metabolic disorder No   HIV patient with CD4 count <200 No     Does this patient have ANY of the above conditions? No     Wt Readings from Last 1 Encounters:   11/26/24 (!) 152.4 kg (336 lb)       Does last documented weight meet parameters? Yes, Age 1 year and older AND weight is documented within the last 12 months.     RECOMMENDATION:  This patient may benefit from an order of Tamiflu for treatment of JR symptoms per the standing order.    Inform patient: Your symptoms and exposure are consistent with Influenza. Since you meet the CDC's criteria for high-risk conditions, taking Tamiflu could be beneficial for treating your symptoms. I can send a prescription to your preferred pharmacy. Would you like to proceed? Yes - Inform patient: If your symptoms progress despite Tamiflu, or if you have concerns about the presence of another infection including coronavirus, please schedule a virtual visit with your provider. RN Proceed to Precautions Reviewed precautions and no additional information is needed.     Use SmartSet Influenza Antiviral Treatment (Symptoms) BID to find suggested Tamiflu  "order     Use Clinical References to answer questions about Tamiflu (i.e. side effects)     AGE AND DOSING TABLE FOR THE SYMPTOMATIC INFLUENZA PATIENT:  Adult or Pediatric Patients >40kg    Oseltamivir 75mg by mouth twice a day for 5 days        Pediatric Dosing by Weight    If 3 to 9 months old Oral Oseltamivir (Tamiflu) 3mg/kg/dose twice daily for 5 days   If 9 to 12 months old Oral Oseltamivir (Tamiflu) 3.5mg/kg/dose twice daily for 5 days   If > 12 months or older and:    15 kg or less Oral Oseltamivir (Tamiflu) 30mg twice a day for 5 days   > 15 kg to 23 kg Oral Oseltamivir (Tamiflu) 45mg twice a day for 5 days   > 23 kg to 40 kg Oral Oseltamivir (Tamiflu) 60mg twice a day for 5 days   > 40 kg Oral Oseltamivir (Tamiflu) 75mg twice a day for 5 days       Additional educational resources include:  http://www.[employer]  http://www.cdc.gov/flu/  Anayeli Blackmon RN      Answer Assessment - Initial Assessment Questions  1. ONSET: \"When did the cough begin?\"       2/9  2. SEVERITY: \"How bad is the cough today?\"       Coughs periodically, when she coughs its hard  3. SPUTUM: \"Describe the color of your sputum\" (e.g., none, dry cough; clear, white, yellow, green)      No  4. HEMOPTYSIS: \"Are you coughing up any blood?\" If Yes, ask: \"How much?\" (e.g., flecks, streaks, tablespoons, etc.)      No  5. DIFFICULTY BREATHING: \"Are you having difficulty breathing?\" If Yes, ask: \"How bad is it?\" (e.g., mild, moderate, severe)       Mild, used inhaler prescribed from bronchitis, used once today  6. FEVER: \"Do you have a fever?\" If Yes, ask: \"What is your temperature, how was it measured, and when did it start?\"      No fever  7. CARDIAC HISTORY: \"Do you have any history of heart disease?\" (e.g., heart attack, congestive heart failure)       Hypertension  8. LUNG HISTORY: \"Do you have any history of lung disease?\"  (e.g., pulmonary embolus, asthma, emphysema)      Recent bronchitis  9. PE RISK FACTORS: \"Do you have a history " "of blood clots?\" (or: recent major surgery, recent prolonged travel, bedridden)      No  10. OTHER SYMPTOMS: \"Do you have any other symptoms?\" (e.g., runny nose, wheezing, chest pain)        No  11. PREGNANCY: \"Is there any chance you are pregnant?\" \"When was your last menstrual period?\"        No  12. TRAVEL: \"Have you traveled out of the country in the last month?\" (e.g., travel history, exposures)        No    Protocols used: Artur-LETY Blackmon RN on 2/10/2025 at 12:29 PM    "

## 2025-06-10 NOTE — RESULT ENCOUNTER NOTE
Caller Name: Hnoey Villa    Call Back Number: [phone]     How would the patient prefer to be contacted with a response: Phone call OK to leave a detailed message    Patient called stating Dr. Kuhn prescribed her nitrofurantoin but she is currently taking rasagiline mesylate she would like to know if it's okay for her to combined them she read on the internet that it causes high blood pressure.   Discussed results during clinic appointment.